# Patient Record
Sex: MALE | Race: WHITE | NOT HISPANIC OR LATINO | Employment: OTHER | URBAN - METROPOLITAN AREA
[De-identification: names, ages, dates, MRNs, and addresses within clinical notes are randomized per-mention and may not be internally consistent; named-entity substitution may affect disease eponyms.]

---

## 2019-06-06 ENCOUNTER — APPOINTMENT (EMERGENCY)
Dept: CT IMAGING | Facility: HOSPITAL | Age: 49
DRG: 392 | End: 2019-06-06
Payer: OTHER GOVERNMENT

## 2019-06-06 ENCOUNTER — HOSPITAL ENCOUNTER (INPATIENT)
Facility: HOSPITAL | Age: 49
LOS: 5 days | Discharge: HOME | DRG: 392 | End: 2019-06-11
Attending: EMERGENCY MEDICINE | Admitting: SURGERY
Payer: OTHER GOVERNMENT

## 2019-06-06 DIAGNOSIS — K57.20 DIVERTICULITIS OF LARGE INTESTINE WITH PERFORATION AND ABSCESS WITHOUT BLEEDING: Primary | ICD-10-CM

## 2019-06-06 LAB
ALBUMIN SERPL BCP-MCNC: 3.3 G/DL (ref 3.5–5)
ALP SERPL-CCNC: 116 U/L (ref 46–116)
ALT SERPL W P-5'-P-CCNC: 56 U/L (ref 12–78)
ANION GAP SERPL CALCULATED.3IONS-SCNC: 11 MMOL/L (ref 4–13)
AST SERPL W P-5'-P-CCNC: 28 U/L (ref 5–45)
BASOPHILS # BLD AUTO: 0.08 THOUSANDS/ΜL (ref 0–0.1)
BASOPHILS NFR BLD AUTO: 1 % (ref 0–1)
BILIRUB SERPL-MCNC: 0.4 MG/DL (ref 0.2–1)
BILIRUB UR QL STRIP: NEGATIVE
BUN SERPL-MCNC: 13 MG/DL (ref 5–25)
CALCIUM SERPL-MCNC: 9.1 MG/DL (ref 8.3–10.1)
CHLORIDE SERPL-SCNC: 102 MMOL/L (ref 100–108)
CLARITY UR: CLEAR
CO2 SERPL-SCNC: 26 MMOL/L (ref 21–32)
COLOR UR: YELLOW
CREAT SERPL-MCNC: 1.21 MG/DL (ref 0.6–1.3)
EOSINOPHIL # BLD AUTO: 0.15 THOUSAND/ΜL (ref 0–0.61)
EOSINOPHIL NFR BLD AUTO: 1 % (ref 0–6)
ERYTHROCYTE [DISTWIDTH] IN BLOOD BY AUTOMATED COUNT: 13.2 % (ref 11.6–15.1)
GFR SERPL CREATININE-BSD FRML MDRD: 70 ML/MIN/1.73SQ M
GLUCOSE SERPL-MCNC: 104 MG/DL (ref 65–140)
GLUCOSE UR STRIP-MCNC: NEGATIVE MG/DL
HCT VFR BLD AUTO: 50.2 % (ref 36.5–49.3)
HGB BLD-MCNC: 16.5 G/DL (ref 12–17)
HGB UR QL STRIP.AUTO: NEGATIVE
IMM GRANULOCYTES # BLD AUTO: 0.08 THOUSAND/UL (ref 0–0.2)
IMM GRANULOCYTES NFR BLD AUTO: 1 % (ref 0–2)
KETONES UR STRIP-MCNC: NEGATIVE MG/DL
LEUKOCYTE ESTERASE UR QL STRIP: NEGATIVE
LIPASE SERPL-CCNC: 97 U/L (ref 73–393)
LYMPHOCYTES # BLD AUTO: 3.03 THOUSANDS/ΜL (ref 0.6–4.47)
LYMPHOCYTES NFR BLD AUTO: 23 % (ref 14–44)
MCH RBC QN AUTO: 28.2 PG (ref 26.8–34.3)
MCHC RBC AUTO-ENTMCNC: 32.9 G/DL (ref 31.4–37.4)
MCV RBC AUTO: 86 FL (ref 82–98)
MONOCYTES # BLD AUTO: 1.4 THOUSAND/ΜL (ref 0.17–1.22)
MONOCYTES NFR BLD AUTO: 11 % (ref 4–12)
NEUTROPHILS # BLD AUTO: 8.25 THOUSANDS/ΜL (ref 1.85–7.62)
NEUTS SEG NFR BLD AUTO: 63 % (ref 43–75)
NITRITE UR QL STRIP: NEGATIVE
NRBC BLD AUTO-RTO: 0 /100 WBCS
PH UR STRIP.AUTO: 6 [PH]
PLATELET # BLD AUTO: 229 THOUSANDS/UL (ref 149–390)
PMV BLD AUTO: 11.2 FL (ref 8.9–12.7)
POTASSIUM SERPL-SCNC: 4.2 MMOL/L (ref 3.5–5.3)
PROT SERPL-MCNC: 7.5 G/DL (ref 6.4–8.2)
PROT UR STRIP-MCNC: NEGATIVE MG/DL
RBC # BLD AUTO: 5.85 MILLION/UL (ref 3.88–5.62)
SODIUM SERPL-SCNC: 139 MMOL/L (ref 136–145)
SP GR UR STRIP.AUTO: 1.01 (ref 1–1.03)
UROBILINOGEN UR QL STRIP.AUTO: 0.2 E.U./DL
WBC # BLD AUTO: 12.99 THOUSAND/UL (ref 4.31–10.16)

## 2019-06-06 PROCEDURE — 81003 URINALYSIS AUTO W/O SCOPE: CPT | Performed by: PHYSICIAN ASSISTANT

## 2019-06-06 PROCEDURE — 99285 EMERGENCY DEPT VISIT HI MDM: CPT | Performed by: PHYSICIAN ASSISTANT

## 2019-06-06 PROCEDURE — 80053 COMPREHEN METABOLIC PANEL: CPT | Performed by: PHYSICIAN ASSISTANT

## 2019-06-06 PROCEDURE — 83690 ASSAY OF LIPASE: CPT | Performed by: PHYSICIAN ASSISTANT

## 2019-06-06 PROCEDURE — 96375 TX/PRO/DX INJ NEW DRUG ADDON: CPT

## 2019-06-06 PROCEDURE — 85025 COMPLETE CBC W/AUTO DIFF WBC: CPT | Performed by: PHYSICIAN ASSISTANT

## 2019-06-06 PROCEDURE — 96374 THER/PROPH/DIAG INJ IV PUSH: CPT

## 2019-06-06 PROCEDURE — 99285 EMERGENCY DEPT VISIT HI MDM: CPT

## 2019-06-06 PROCEDURE — 36415 COLL VENOUS BLD VENIPUNCTURE: CPT | Performed by: PHYSICIAN ASSISTANT

## 2019-06-06 PROCEDURE — 96361 HYDRATE IV INFUSION ADD-ON: CPT

## 2019-06-06 PROCEDURE — 99221 1ST HOSP IP/OBS SF/LOW 40: CPT | Performed by: PHYSICIAN ASSISTANT

## 2019-06-06 PROCEDURE — NC001 PR NO CHARGE: Performed by: PHYSICIAN ASSISTANT

## 2019-06-06 PROCEDURE — 74177 CT ABD & PELVIS W/CONTRAST: CPT

## 2019-06-06 RX ORDER — HEPARIN SODIUM 5000 [USP'U]/ML
5000 INJECTION, SOLUTION INTRAVENOUS; SUBCUTANEOUS EVERY 8 HOURS SCHEDULED
Status: DISCONTINUED | OUTPATIENT
Start: 2019-06-06 | End: 2019-06-11 | Stop reason: HOSPADM

## 2019-06-06 RX ORDER — DICYCLOMINE HCL 20 MG
20 TABLET ORAL ONCE
Status: COMPLETED | OUTPATIENT
Start: 2019-06-06 | End: 2019-06-06

## 2019-06-06 RX ORDER — DIPHENHYDRAMINE HYDROCHLORIDE 50 MG/ML
25 INJECTION INTRAMUSCULAR; INTRAVENOUS
Status: DISCONTINUED | OUTPATIENT
Start: 2019-06-06 | End: 2019-06-11 | Stop reason: HOSPADM

## 2019-06-06 RX ORDER — SODIUM CHLORIDE, SODIUM LACTATE, POTASSIUM CHLORIDE, CALCIUM CHLORIDE 600; 310; 30; 20 MG/100ML; MG/100ML; MG/100ML; MG/100ML
125 INJECTION, SOLUTION INTRAVENOUS CONTINUOUS
Status: DISCONTINUED | OUTPATIENT
Start: 2019-06-06 | End: 2019-06-11 | Stop reason: HOSPADM

## 2019-06-06 RX ORDER — ONDANSETRON 2 MG/ML
4 INJECTION INTRAMUSCULAR; INTRAVENOUS EVERY 6 HOURS PRN
Status: DISCONTINUED | OUTPATIENT
Start: 2019-06-06 | End: 2019-06-11 | Stop reason: HOSPADM

## 2019-06-06 RX ORDER — HYDROMORPHONE HCL/PF 1 MG/ML
0.5 SYRINGE (ML) INJECTION
Status: DISCONTINUED | OUTPATIENT
Start: 2019-06-06 | End: 2019-06-11 | Stop reason: HOSPADM

## 2019-06-06 RX ORDER — NICOTINE 21 MG/24HR
1 PATCH, TRANSDERMAL 24 HOURS TRANSDERMAL DAILY
Status: DISCONTINUED | OUTPATIENT
Start: 2019-06-07 | End: 2019-06-11 | Stop reason: HOSPADM

## 2019-06-06 RX ORDER — KETOROLAC TROMETHAMINE 30 MG/ML
15 INJECTION, SOLUTION INTRAMUSCULAR; INTRAVENOUS ONCE
Status: COMPLETED | OUTPATIENT
Start: 2019-06-06 | End: 2019-06-06

## 2019-06-06 RX ORDER — KETOROLAC TROMETHAMINE 30 MG/ML
15 INJECTION, SOLUTION INTRAMUSCULAR; INTRAVENOUS EVERY 6 HOURS PRN
Status: DISCONTINUED | OUTPATIENT
Start: 2019-06-06 | End: 2019-06-11 | Stop reason: HOSPADM

## 2019-06-06 RX ORDER — MORPHINE SULFATE 4 MG/ML
4 INJECTION, SOLUTION INTRAMUSCULAR; INTRAVENOUS ONCE
Status: COMPLETED | OUTPATIENT
Start: 2019-06-06 | End: 2019-06-06

## 2019-06-06 RX ADMIN — METRONIDAZOLE 500 MG: 500 INJECTION, SOLUTION INTRAVENOUS at 20:47

## 2019-06-06 RX ADMIN — IOHEXOL 50 ML: 240 INJECTION, SOLUTION INTRATHECAL; INTRAVASCULAR; INTRAVENOUS; ORAL at 16:08

## 2019-06-06 RX ADMIN — SODIUM CHLORIDE, SODIUM LACTATE, POTASSIUM CHLORIDE, AND CALCIUM CHLORIDE 125 ML/HR: .6; .31; .03; .02 INJECTION, SOLUTION INTRAVENOUS at 20:31

## 2019-06-06 RX ADMIN — DICYCLOMINE HYDROCHLORIDE 20 MG: 20 TABLET ORAL at 15:45

## 2019-06-06 RX ADMIN — DIPHENHYDRAMINE HYDROCHLORIDE 25 MG: 50 INJECTION, SOLUTION INTRAMUSCULAR; INTRAVENOUS at 22:52

## 2019-06-06 RX ADMIN — IOHEXOL 100 ML: 350 INJECTION, SOLUTION INTRAVENOUS at 16:09

## 2019-06-06 RX ADMIN — MORPHINE SULFATE 4 MG: 4 INJECTION INTRAVENOUS at 16:57

## 2019-06-06 RX ADMIN — SODIUM CHLORIDE 1000 ML: 0.9 INJECTION, SOLUTION INTRAVENOUS at 13:16

## 2019-06-06 RX ADMIN — HYDROMORPHONE HYDROCHLORIDE 0.5 MG: 1 INJECTION, SOLUTION INTRAMUSCULAR; INTRAVENOUS; SUBCUTANEOUS at 20:37

## 2019-06-06 RX ADMIN — PIPERACILLIN SODIUM,TAZOBACTAM SODIUM 4.5 G: 4; .5 INJECTION, POWDER, FOR SOLUTION INTRAVENOUS at 17:06

## 2019-06-06 RX ADMIN — KETOROLAC TROMETHAMINE 15 MG: 30 INJECTION, SOLUTION INTRAMUSCULAR; INTRAVENOUS at 13:19

## 2019-06-06 RX ADMIN — HEPARIN SODIUM 5000 UNITS: 5000 INJECTION, SOLUTION INTRAVENOUS; SUBCUTANEOUS at 22:52

## 2019-06-07 PROBLEM — K57.20 DIVERTICULITIS OF LARGE INTESTINE WITH PERFORATION AND ABSCESS WITHOUT BLEEDING: Status: ACTIVE | Noted: 2019-06-07

## 2019-06-07 PROBLEM — R10.9 ABDOMINAL PAIN: Status: ACTIVE | Noted: 2019-06-07

## 2019-06-07 LAB
ANION GAP SERPL CALCULATED.3IONS-SCNC: 8 MMOL/L (ref 4–13)
BASOPHILS # BLD AUTO: 0.07 THOUSANDS/ΜL (ref 0–0.1)
BASOPHILS NFR BLD AUTO: 1 % (ref 0–1)
BUN SERPL-MCNC: 15 MG/DL (ref 5–25)
CALCIUM SERPL-MCNC: 8.7 MG/DL (ref 8.3–10.1)
CHLORIDE SERPL-SCNC: 104 MMOL/L (ref 100–108)
CO2 SERPL-SCNC: 27 MMOL/L (ref 21–32)
CREAT SERPL-MCNC: 1.24 MG/DL (ref 0.6–1.3)
EOSINOPHIL # BLD AUTO: 0.16 THOUSAND/ΜL (ref 0–0.61)
EOSINOPHIL NFR BLD AUTO: 1 % (ref 0–6)
ERYTHROCYTE [DISTWIDTH] IN BLOOD BY AUTOMATED COUNT: 13.3 % (ref 11.6–15.1)
GFR SERPL CREATININE-BSD FRML MDRD: 68 ML/MIN/1.73SQ M
GLUCOSE SERPL-MCNC: 113 MG/DL (ref 65–140)
HCT VFR BLD AUTO: 46.9 % (ref 36.5–49.3)
HGB BLD-MCNC: 15.3 G/DL (ref 12–17)
IMM GRANULOCYTES # BLD AUTO: 0.08 THOUSAND/UL (ref 0–0.2)
IMM GRANULOCYTES NFR BLD AUTO: 1 % (ref 0–2)
LYMPHOCYTES # BLD AUTO: 2.66 THOUSANDS/ΜL (ref 0.6–4.47)
LYMPHOCYTES NFR BLD AUTO: 21 % (ref 14–44)
MCH RBC QN AUTO: 28.2 PG (ref 26.8–34.3)
MCHC RBC AUTO-ENTMCNC: 32.6 G/DL (ref 31.4–37.4)
MCV RBC AUTO: 87 FL (ref 82–98)
MONOCYTES # BLD AUTO: 1.17 THOUSAND/ΜL (ref 0.17–1.22)
MONOCYTES NFR BLD AUTO: 9 % (ref 4–12)
NEUTROPHILS # BLD AUTO: 8.28 THOUSANDS/ΜL (ref 1.85–7.62)
NEUTS SEG NFR BLD AUTO: 67 % (ref 43–75)
NRBC BLD AUTO-RTO: 0 /100 WBCS
PLATELET # BLD AUTO: 204 THOUSANDS/UL (ref 149–390)
PMV BLD AUTO: 11.5 FL (ref 8.9–12.7)
POTASSIUM SERPL-SCNC: 4.6 MMOL/L (ref 3.5–5.3)
RBC # BLD AUTO: 5.42 MILLION/UL (ref 3.88–5.62)
SODIUM SERPL-SCNC: 139 MMOL/L (ref 136–145)
WBC # BLD AUTO: 12.42 THOUSAND/UL (ref 4.31–10.16)

## 2019-06-07 PROCEDURE — 99232 SBSQ HOSP IP/OBS MODERATE 35: CPT | Performed by: SURGERY

## 2019-06-07 PROCEDURE — 80048 BASIC METABOLIC PNL TOTAL CA: CPT | Performed by: PHYSICIAN ASSISTANT

## 2019-06-07 PROCEDURE — 85025 COMPLETE CBC W/AUTO DIFF WBC: CPT | Performed by: PHYSICIAN ASSISTANT

## 2019-06-07 RX ORDER — LEVOFLOXACIN 500 MG/1
500 TABLET, FILM COATED ORAL EVERY 24 HOURS
Qty: 10 TABLET | Refills: 0 | Status: SHIPPED | OUTPATIENT
Start: 2019-06-07 | End: 2019-06-17

## 2019-06-07 RX ORDER — METRONIDAZOLE 500 MG/1
500 TABLET ORAL EVERY 8 HOURS SCHEDULED
Qty: 30 TABLET | Refills: 0 | Status: SHIPPED | OUTPATIENT
Start: 2019-06-07 | End: 2019-06-17

## 2019-06-07 RX ADMIN — METRONIDAZOLE 500 MG: 500 INJECTION, SOLUTION INTRAVENOUS at 05:00

## 2019-06-07 RX ADMIN — KETOROLAC TROMETHAMINE 15 MG: 30 INJECTION, SOLUTION INTRAMUSCULAR; INTRAVENOUS at 19:40

## 2019-06-07 RX ADMIN — PIPERACILLIN SODIUM,TAZOBACTAM SODIUM 3.38 G: 3; .375 INJECTION, POWDER, FOR SOLUTION INTRAVENOUS at 00:00

## 2019-06-07 RX ADMIN — HEPARIN SODIUM 5000 UNITS: 5000 INJECTION, SOLUTION INTRAVENOUS; SUBCUTANEOUS at 13:39

## 2019-06-07 RX ADMIN — HEPARIN SODIUM 5000 UNITS: 5000 INJECTION, SOLUTION INTRAVENOUS; SUBCUTANEOUS at 21:27

## 2019-06-07 RX ADMIN — METRONIDAZOLE 500 MG: 500 INJECTION, SOLUTION INTRAVENOUS at 19:42

## 2019-06-07 RX ADMIN — HYDROMORPHONE HYDROCHLORIDE 0.5 MG: 1 INJECTION, SOLUTION INTRAMUSCULAR; INTRAVENOUS; SUBCUTANEOUS at 04:59

## 2019-06-07 RX ADMIN — HYDROMORPHONE HYDROCHLORIDE 0.5 MG: 1 INJECTION, SOLUTION INTRAMUSCULAR; INTRAVENOUS; SUBCUTANEOUS at 12:51

## 2019-06-07 RX ADMIN — NICOTINE 1 PATCH: 14 PATCH TRANSDERMAL at 09:00

## 2019-06-07 RX ADMIN — METRONIDAZOLE 500 MG: 500 INJECTION, SOLUTION INTRAVENOUS at 12:20

## 2019-06-07 RX ADMIN — HEPARIN SODIUM 5000 UNITS: 5000 INJECTION, SOLUTION INTRAVENOUS; SUBCUTANEOUS at 05:00

## 2019-06-07 RX ADMIN — DIPHENHYDRAMINE HYDROCHLORIDE 25 MG: 50 INJECTION, SOLUTION INTRAMUSCULAR; INTRAVENOUS at 21:30

## 2019-06-07 RX ADMIN — PIPERACILLIN SODIUM,TAZOBACTAM SODIUM 3.38 G: 3; .375 INJECTION, POWDER, FOR SOLUTION INTRAVENOUS at 11:45

## 2019-06-07 RX ADMIN — SODIUM CHLORIDE, SODIUM LACTATE, POTASSIUM CHLORIDE, AND CALCIUM CHLORIDE 125 ML/HR: .6; .31; .03; .02 INJECTION, SOLUTION INTRAVENOUS at 19:42

## 2019-06-07 RX ADMIN — PIPERACILLIN SODIUM,TAZOBACTAM SODIUM 3.38 G: 3; .375 INJECTION, POWDER, FOR SOLUTION INTRAVENOUS at 23:31

## 2019-06-07 RX ADMIN — HYDROMORPHONE HYDROCHLORIDE 0.5 MG: 1 INJECTION, SOLUTION INTRAMUSCULAR; INTRAVENOUS; SUBCUTANEOUS at 17:39

## 2019-06-07 RX ADMIN — HYDROMORPHONE HYDROCHLORIDE 0.5 MG: 1 INJECTION, SOLUTION INTRAMUSCULAR; INTRAVENOUS; SUBCUTANEOUS at 21:27

## 2019-06-07 RX ADMIN — PIPERACILLIN SODIUM,TAZOBACTAM SODIUM 3.38 G: 3; .375 INJECTION, POWDER, FOR SOLUTION INTRAVENOUS at 05:00

## 2019-06-07 RX ADMIN — SODIUM CHLORIDE, SODIUM LACTATE, POTASSIUM CHLORIDE, AND CALCIUM CHLORIDE 125 ML/HR: .6; .31; .03; .02 INJECTION, SOLUTION INTRAVENOUS at 09:00

## 2019-06-07 RX ADMIN — PIPERACILLIN SODIUM,TAZOBACTAM SODIUM 3.38 G: 3; .375 INJECTION, POWDER, FOR SOLUTION INTRAVENOUS at 17:39

## 2019-06-07 RX ADMIN — KETOROLAC TROMETHAMINE 15 MG: 30 INJECTION, SOLUTION INTRAMUSCULAR; INTRAVENOUS at 09:07

## 2019-06-08 LAB
ANION GAP SERPL CALCULATED.3IONS-SCNC: 7 MMOL/L (ref 4–13)
BASOPHILS # BLD AUTO: 0.05 THOUSANDS/ΜL (ref 0–0.1)
BASOPHILS NFR BLD AUTO: 1 % (ref 0–1)
BUN SERPL-MCNC: 18 MG/DL (ref 5–25)
CALCIUM SERPL-MCNC: 8.9 MG/DL (ref 8.3–10.1)
CHLORIDE SERPL-SCNC: 105 MMOL/L (ref 100–108)
CO2 SERPL-SCNC: 28 MMOL/L (ref 21–32)
CREAT SERPL-MCNC: 1.26 MG/DL (ref 0.6–1.3)
EOSINOPHIL # BLD AUTO: 0.15 THOUSAND/ΜL (ref 0–0.61)
EOSINOPHIL NFR BLD AUTO: 1 % (ref 0–6)
ERYTHROCYTE [DISTWIDTH] IN BLOOD BY AUTOMATED COUNT: 13.2 % (ref 11.6–15.1)
GFR SERPL CREATININE-BSD FRML MDRD: 67 ML/MIN/1.73SQ M
GLUCOSE SERPL-MCNC: 85 MG/DL (ref 65–140)
HCT VFR BLD AUTO: 45.1 % (ref 36.5–49.3)
HGB BLD-MCNC: 14.5 G/DL (ref 12–17)
IMM GRANULOCYTES # BLD AUTO: 0.06 THOUSAND/UL (ref 0–0.2)
IMM GRANULOCYTES NFR BLD AUTO: 1 % (ref 0–2)
LYMPHOCYTES # BLD AUTO: 2.36 THOUSANDS/ΜL (ref 0.6–4.47)
LYMPHOCYTES NFR BLD AUTO: 22 % (ref 14–44)
MAGNESIUM SERPL-MCNC: 2.2 MG/DL (ref 1.6–2.6)
MCH RBC QN AUTO: 27.8 PG (ref 26.8–34.3)
MCHC RBC AUTO-ENTMCNC: 32.2 G/DL (ref 31.4–37.4)
MCV RBC AUTO: 87 FL (ref 82–98)
MONOCYTES # BLD AUTO: 1.06 THOUSAND/ΜL (ref 0.17–1.22)
MONOCYTES NFR BLD AUTO: 10 % (ref 4–12)
NEUTROPHILS # BLD AUTO: 6.94 THOUSANDS/ΜL (ref 1.85–7.62)
NEUTS SEG NFR BLD AUTO: 65 % (ref 43–75)
NRBC BLD AUTO-RTO: 0 /100 WBCS
PLATELET # BLD AUTO: 210 THOUSANDS/UL (ref 149–390)
PMV BLD AUTO: 11.6 FL (ref 8.9–12.7)
POTASSIUM SERPL-SCNC: 4.2 MMOL/L (ref 3.5–5.3)
RBC # BLD AUTO: 5.21 MILLION/UL (ref 3.88–5.62)
SODIUM SERPL-SCNC: 140 MMOL/L (ref 136–145)
WBC # BLD AUTO: 10.62 THOUSAND/UL (ref 4.31–10.16)

## 2019-06-08 PROCEDURE — 83735 ASSAY OF MAGNESIUM: CPT | Performed by: PHYSICIAN ASSISTANT

## 2019-06-08 PROCEDURE — 80048 BASIC METABOLIC PNL TOTAL CA: CPT | Performed by: PHYSICIAN ASSISTANT

## 2019-06-08 PROCEDURE — 85025 COMPLETE CBC W/AUTO DIFF WBC: CPT | Performed by: PHYSICIAN ASSISTANT

## 2019-06-08 PROCEDURE — 99231 SBSQ HOSP IP/OBS SF/LOW 25: CPT | Performed by: SURGERY

## 2019-06-08 RX ADMIN — HYDROMORPHONE HYDROCHLORIDE 0.5 MG: 1 INJECTION, SOLUTION INTRAMUSCULAR; INTRAVENOUS; SUBCUTANEOUS at 04:53

## 2019-06-08 RX ADMIN — PIPERACILLIN SODIUM,TAZOBACTAM SODIUM 3.38 G: 3; .375 INJECTION, POWDER, FOR SOLUTION INTRAVENOUS at 11:23

## 2019-06-08 RX ADMIN — PIPERACILLIN SODIUM,TAZOBACTAM SODIUM 3.38 G: 3; .375 INJECTION, POWDER, FOR SOLUTION INTRAVENOUS at 17:29

## 2019-06-08 RX ADMIN — METRONIDAZOLE 500 MG: 500 INJECTION, SOLUTION INTRAVENOUS at 21:05

## 2019-06-08 RX ADMIN — HYDROMORPHONE HYDROCHLORIDE 0.5 MG: 1 INJECTION, SOLUTION INTRAMUSCULAR; INTRAVENOUS; SUBCUTANEOUS at 12:09

## 2019-06-08 RX ADMIN — SODIUM CHLORIDE, SODIUM LACTATE, POTASSIUM CHLORIDE, AND CALCIUM CHLORIDE 125 ML/HR: .6; .31; .03; .02 INJECTION, SOLUTION INTRAVENOUS at 04:54

## 2019-06-08 RX ADMIN — HYDROMORPHONE HYDROCHLORIDE 0.5 MG: 1 INJECTION, SOLUTION INTRAMUSCULAR; INTRAVENOUS; SUBCUTANEOUS at 15:43

## 2019-06-08 RX ADMIN — METRONIDAZOLE 500 MG: 500 INJECTION, SOLUTION INTRAVENOUS at 12:02

## 2019-06-08 RX ADMIN — HEPARIN SODIUM 5000 UNITS: 5000 INJECTION, SOLUTION INTRAVENOUS; SUBCUTANEOUS at 05:00

## 2019-06-08 RX ADMIN — HYDROMORPHONE HYDROCHLORIDE 0.5 MG: 1 INJECTION, SOLUTION INTRAMUSCULAR; INTRAVENOUS; SUBCUTANEOUS at 08:25

## 2019-06-08 RX ADMIN — HYDROMORPHONE HYDROCHLORIDE 0.5 MG: 1 INJECTION, SOLUTION INTRAMUSCULAR; INTRAVENOUS; SUBCUTANEOUS at 18:56

## 2019-06-08 RX ADMIN — METRONIDAZOLE 500 MG: 500 INJECTION, SOLUTION INTRAVENOUS at 04:56

## 2019-06-08 RX ADMIN — KETOROLAC TROMETHAMINE 15 MG: 30 INJECTION, SOLUTION INTRAMUSCULAR; INTRAVENOUS at 20:20

## 2019-06-08 RX ADMIN — HYDROMORPHONE HYDROCHLORIDE 0.5 MG: 1 INJECTION, SOLUTION INTRAMUSCULAR; INTRAVENOUS; SUBCUTANEOUS at 22:12

## 2019-06-08 RX ADMIN — DIPHENHYDRAMINE HYDROCHLORIDE 25 MG: 50 INJECTION, SOLUTION INTRAMUSCULAR; INTRAVENOUS at 21:05

## 2019-06-08 RX ADMIN — HEPARIN SODIUM 5000 UNITS: 5000 INJECTION, SOLUTION INTRAVENOUS; SUBCUTANEOUS at 21:12

## 2019-06-08 RX ADMIN — PIPERACILLIN SODIUM,TAZOBACTAM SODIUM 3.38 G: 3; .375 INJECTION, POWDER, FOR SOLUTION INTRAVENOUS at 05:54

## 2019-06-08 RX ADMIN — HYDROMORPHONE HYDROCHLORIDE 0.5 MG: 1 INJECTION, SOLUTION INTRAMUSCULAR; INTRAVENOUS; SUBCUTANEOUS at 00:59

## 2019-06-08 RX ADMIN — NICOTINE 1 PATCH: 14 PATCH TRANSDERMAL at 08:25

## 2019-06-08 RX ADMIN — HEPARIN SODIUM 5000 UNITS: 5000 INJECTION, SOLUTION INTRAVENOUS; SUBCUTANEOUS at 14:23

## 2019-06-09 PROCEDURE — 99232 SBSQ HOSP IP/OBS MODERATE 35: CPT | Performed by: SURGERY

## 2019-06-09 RX ORDER — NICOTINE 21 MG/24HR
14 PATCH, TRANSDERMAL 24 HOURS TRANSDERMAL ONCE
Status: COMPLETED | OUTPATIENT
Start: 2019-06-09 | End: 2019-06-10

## 2019-06-09 RX ADMIN — KETOROLAC TROMETHAMINE 15 MG: 30 INJECTION, SOLUTION INTRAMUSCULAR; INTRAVENOUS at 20:48

## 2019-06-09 RX ADMIN — HYDROMORPHONE HYDROCHLORIDE 0.5 MG: 1 INJECTION, SOLUTION INTRAMUSCULAR; INTRAVENOUS; SUBCUTANEOUS at 17:31

## 2019-06-09 RX ADMIN — HEPARIN SODIUM 5000 UNITS: 5000 INJECTION, SOLUTION INTRAVENOUS; SUBCUTANEOUS at 13:14

## 2019-06-09 RX ADMIN — METRONIDAZOLE 500 MG: 500 INJECTION, SOLUTION INTRAVENOUS at 20:46

## 2019-06-09 RX ADMIN — PIPERACILLIN SODIUM,TAZOBACTAM SODIUM 3.38 G: 3; .375 INJECTION, POWDER, FOR SOLUTION INTRAVENOUS at 23:34

## 2019-06-09 RX ADMIN — KETOROLAC TROMETHAMINE 15 MG: 30 INJECTION, SOLUTION INTRAMUSCULAR; INTRAVENOUS at 11:53

## 2019-06-09 RX ADMIN — HEPARIN SODIUM 5000 UNITS: 5000 INJECTION, SOLUTION INTRAVENOUS; SUBCUTANEOUS at 05:02

## 2019-06-09 RX ADMIN — HYDROMORPHONE HYDROCHLORIDE 0.5 MG: 1 INJECTION, SOLUTION INTRAMUSCULAR; INTRAVENOUS; SUBCUTANEOUS at 05:02

## 2019-06-09 RX ADMIN — SODIUM CHLORIDE, SODIUM LACTATE, POTASSIUM CHLORIDE, AND CALCIUM CHLORIDE 125 ML/HR: .6; .31; .03; .02 INJECTION, SOLUTION INTRAVENOUS at 22:19

## 2019-06-09 RX ADMIN — HYDROMORPHONE HYDROCHLORIDE 0.5 MG: 1 INJECTION, SOLUTION INTRAMUSCULAR; INTRAVENOUS; SUBCUTANEOUS at 09:00

## 2019-06-09 RX ADMIN — PIPERACILLIN SODIUM,TAZOBACTAM SODIUM 3.38 G: 3; .375 INJECTION, POWDER, FOR SOLUTION INTRAVENOUS at 05:57

## 2019-06-09 RX ADMIN — HEPARIN SODIUM 5000 UNITS: 5000 INJECTION, SOLUTION INTRAVENOUS; SUBCUTANEOUS at 22:19

## 2019-06-09 RX ADMIN — METRONIDAZOLE 500 MG: 500 INJECTION, SOLUTION INTRAVENOUS at 05:02

## 2019-06-09 RX ADMIN — HYDROMORPHONE HYDROCHLORIDE 0.5 MG: 1 INJECTION, SOLUTION INTRAMUSCULAR; INTRAVENOUS; SUBCUTANEOUS at 22:36

## 2019-06-09 RX ADMIN — DIPHENHYDRAMINE HYDROCHLORIDE 25 MG: 50 INJECTION, SOLUTION INTRAMUSCULAR; INTRAVENOUS at 23:33

## 2019-06-09 RX ADMIN — NICOTINE 14 MG: 14 PATCH TRANSDERMAL at 22:19

## 2019-06-09 RX ADMIN — KETOROLAC TROMETHAMINE 15 MG: 30 INJECTION, SOLUTION INTRAMUSCULAR; INTRAVENOUS at 06:00

## 2019-06-09 RX ADMIN — SODIUM CHLORIDE, SODIUM LACTATE, POTASSIUM CHLORIDE, AND CALCIUM CHLORIDE 125 ML/HR: .6; .31; .03; .02 INJECTION, SOLUTION INTRAVENOUS at 04:53

## 2019-06-09 RX ADMIN — HYDROMORPHONE HYDROCHLORIDE 0.5 MG: 1 INJECTION, SOLUTION INTRAMUSCULAR; INTRAVENOUS; SUBCUTANEOUS at 01:54

## 2019-06-09 RX ADMIN — PIPERACILLIN SODIUM,TAZOBACTAM SODIUM 3.38 G: 3; .375 INJECTION, POWDER, FOR SOLUTION INTRAVENOUS at 11:18

## 2019-06-09 RX ADMIN — METRONIDAZOLE 500 MG: 500 INJECTION, SOLUTION INTRAVENOUS at 11:50

## 2019-06-09 RX ADMIN — SODIUM CHLORIDE, SODIUM LACTATE, POTASSIUM CHLORIDE, AND CALCIUM CHLORIDE 125 ML/HR: .6; .31; .03; .02 INJECTION, SOLUTION INTRAVENOUS at 13:13

## 2019-06-09 RX ADMIN — NICOTINE 1 PATCH: 14 PATCH TRANSDERMAL at 09:00

## 2019-06-09 RX ADMIN — PIPERACILLIN SODIUM,TAZOBACTAM SODIUM 3.38 G: 3; .375 INJECTION, POWDER, FOR SOLUTION INTRAVENOUS at 00:30

## 2019-06-09 RX ADMIN — PIPERACILLIN SODIUM,TAZOBACTAM SODIUM 3.38 G: 3; .375 INJECTION, POWDER, FOR SOLUTION INTRAVENOUS at 17:31

## 2019-06-10 ENCOUNTER — APPOINTMENT (INPATIENT)
Dept: CT IMAGING | Facility: HOSPITAL | Age: 49
DRG: 392 | End: 2019-06-10
Payer: OTHER GOVERNMENT

## 2019-06-10 LAB
ANION GAP SERPL CALCULATED.3IONS-SCNC: 10 MMOL/L (ref 4–13)
BASOPHILS # BLD AUTO: 0.06 THOUSANDS/ΜL (ref 0–0.1)
BASOPHILS NFR BLD AUTO: 1 % (ref 0–1)
BUN SERPL-MCNC: 9 MG/DL (ref 5–25)
CALCIUM SERPL-MCNC: 9.1 MG/DL (ref 8.3–10.1)
CHLORIDE SERPL-SCNC: 101 MMOL/L (ref 100–108)
CO2 SERPL-SCNC: 29 MMOL/L (ref 21–32)
CREAT SERPL-MCNC: 1.24 MG/DL (ref 0.6–1.3)
EOSINOPHIL # BLD AUTO: 0.15 THOUSAND/ΜL (ref 0–0.61)
EOSINOPHIL NFR BLD AUTO: 2 % (ref 0–6)
ERYTHROCYTE [DISTWIDTH] IN BLOOD BY AUTOMATED COUNT: 13.1 % (ref 11.6–15.1)
GFR SERPL CREATININE-BSD FRML MDRD: 68 ML/MIN/1.73SQ M
GLUCOSE SERPL-MCNC: 96 MG/DL (ref 65–140)
HCT VFR BLD AUTO: 46.2 % (ref 36.5–49.3)
HGB BLD-MCNC: 14.8 G/DL (ref 12–17)
IMM GRANULOCYTES # BLD AUTO: 0.04 THOUSAND/UL (ref 0–0.2)
IMM GRANULOCYTES NFR BLD AUTO: 1 % (ref 0–2)
LYMPHOCYTES # BLD AUTO: 1.94 THOUSANDS/ΜL (ref 0.6–4.47)
LYMPHOCYTES NFR BLD AUTO: 28 % (ref 14–44)
MCH RBC QN AUTO: 28 PG (ref 26.8–34.3)
MCHC RBC AUTO-ENTMCNC: 32 G/DL (ref 31.4–37.4)
MCV RBC AUTO: 87 FL (ref 82–98)
MONOCYTES # BLD AUTO: 0.75 THOUSAND/ΜL (ref 0.17–1.22)
MONOCYTES NFR BLD AUTO: 11 % (ref 4–12)
NEUTROPHILS # BLD AUTO: 3.95 THOUSANDS/ΜL (ref 1.85–7.62)
NEUTS SEG NFR BLD AUTO: 57 % (ref 43–75)
NRBC BLD AUTO-RTO: 0 /100 WBCS
PLATELET # BLD AUTO: 226 THOUSANDS/UL (ref 149–390)
PMV BLD AUTO: 11.4 FL (ref 8.9–12.7)
POTASSIUM SERPL-SCNC: 3.8 MMOL/L (ref 3.5–5.3)
RBC # BLD AUTO: 5.29 MILLION/UL (ref 3.88–5.62)
SODIUM SERPL-SCNC: 140 MMOL/L (ref 136–145)
WBC # BLD AUTO: 6.89 THOUSAND/UL (ref 4.31–10.16)

## 2019-06-10 PROCEDURE — 99232 SBSQ HOSP IP/OBS MODERATE 35: CPT | Performed by: SURGERY

## 2019-06-10 PROCEDURE — 85025 COMPLETE CBC W/AUTO DIFF WBC: CPT | Performed by: PHYSICIAN ASSISTANT

## 2019-06-10 PROCEDURE — 80048 BASIC METABOLIC PNL TOTAL CA: CPT | Performed by: PHYSICIAN ASSISTANT

## 2019-06-10 PROCEDURE — 74177 CT ABD & PELVIS W/CONTRAST: CPT

## 2019-06-10 RX ADMIN — DIPHENHYDRAMINE HYDROCHLORIDE 25 MG: 50 INJECTION, SOLUTION INTRAMUSCULAR; INTRAVENOUS at 23:23

## 2019-06-10 RX ADMIN — NICOTINE 1 PATCH: 14 PATCH TRANSDERMAL at 09:29

## 2019-06-10 RX ADMIN — HEPARIN SODIUM 5000 UNITS: 5000 INJECTION, SOLUTION INTRAVENOUS; SUBCUTANEOUS at 14:55

## 2019-06-10 RX ADMIN — PIPERACILLIN SODIUM,TAZOBACTAM SODIUM 3.38 G: 3; .375 INJECTION, POWDER, FOR SOLUTION INTRAVENOUS at 23:23

## 2019-06-10 RX ADMIN — PIPERACILLIN SODIUM,TAZOBACTAM SODIUM 3.38 G: 3; .375 INJECTION, POWDER, FOR SOLUTION INTRAVENOUS at 06:26

## 2019-06-10 RX ADMIN — SODIUM CHLORIDE, SODIUM LACTATE, POTASSIUM CHLORIDE, AND CALCIUM CHLORIDE 125 ML/HR: .6; .31; .03; .02 INJECTION, SOLUTION INTRAVENOUS at 21:06

## 2019-06-10 RX ADMIN — KETOROLAC TROMETHAMINE 15 MG: 30 INJECTION, SOLUTION INTRAMUSCULAR; INTRAVENOUS at 17:21

## 2019-06-10 RX ADMIN — PIPERACILLIN SODIUM,TAZOBACTAM SODIUM 3.38 G: 3; .375 INJECTION, POWDER, FOR SOLUTION INTRAVENOUS at 11:51

## 2019-06-10 RX ADMIN — PIPERACILLIN SODIUM,TAZOBACTAM SODIUM 3.38 G: 3; .375 INJECTION, POWDER, FOR SOLUTION INTRAVENOUS at 17:15

## 2019-06-10 RX ADMIN — METRONIDAZOLE 500 MG: 500 INJECTION, SOLUTION INTRAVENOUS at 05:01

## 2019-06-10 RX ADMIN — IOHEXOL 100 ML: 350 INJECTION, SOLUTION INTRAVENOUS at 13:00

## 2019-06-10 RX ADMIN — HYDROMORPHONE HYDROCHLORIDE 0.5 MG: 1 INJECTION, SOLUTION INTRAMUSCULAR; INTRAVENOUS; SUBCUTANEOUS at 09:31

## 2019-06-10 RX ADMIN — IOHEXOL 50 ML: 240 INJECTION, SOLUTION INTRATHECAL; INTRAVASCULAR; INTRAVENOUS; ORAL at 13:00

## 2019-06-10 RX ADMIN — KETOROLAC TROMETHAMINE 15 MG: 30 INJECTION, SOLUTION INTRAMUSCULAR; INTRAVENOUS at 04:59

## 2019-06-10 RX ADMIN — HYDROMORPHONE HYDROCHLORIDE 0.5 MG: 1 INJECTION, SOLUTION INTRAMUSCULAR; INTRAVENOUS; SUBCUTANEOUS at 21:27

## 2019-06-10 RX ADMIN — HYDROMORPHONE HYDROCHLORIDE 0.5 MG: 1 INJECTION, SOLUTION INTRAMUSCULAR; INTRAVENOUS; SUBCUTANEOUS at 04:16

## 2019-06-10 RX ADMIN — HEPARIN SODIUM 5000 UNITS: 5000 INJECTION, SOLUTION INTRAVENOUS; SUBCUTANEOUS at 21:06

## 2019-06-10 RX ADMIN — METRONIDAZOLE 500 MG: 500 INJECTION, SOLUTION INTRAVENOUS at 12:45

## 2019-06-10 RX ADMIN — METRONIDAZOLE 500 MG: 500 INJECTION, SOLUTION INTRAVENOUS at 21:06

## 2019-06-10 RX ADMIN — HEPARIN SODIUM 5000 UNITS: 5000 INJECTION, SOLUTION INTRAVENOUS; SUBCUTANEOUS at 06:26

## 2019-06-11 VITALS
TEMPERATURE: 98.2 F | HEIGHT: 69 IN | DIASTOLIC BLOOD PRESSURE: 79 MMHG | WEIGHT: 247.7 LBS | BODY MASS INDEX: 36.69 KG/M2 | OXYGEN SATURATION: 95 % | RESPIRATION RATE: 18 BRPM | SYSTOLIC BLOOD PRESSURE: 142 MMHG | HEART RATE: 62 BPM

## 2019-06-11 PROBLEM — R10.9 ABDOMINAL PAIN: Status: RESOLVED | Noted: 2019-06-07 | Resolved: 2019-06-11

## 2019-06-11 LAB
ANION GAP SERPL CALCULATED.3IONS-SCNC: 8 MMOL/L (ref 4–13)
BASOPHILS # BLD AUTO: 0.07 THOUSANDS/ΜL (ref 0–0.1)
BASOPHILS NFR BLD AUTO: 1 % (ref 0–1)
BUN SERPL-MCNC: 7 MG/DL (ref 5–25)
CALCIUM SERPL-MCNC: 8.8 MG/DL (ref 8.3–10.1)
CHLORIDE SERPL-SCNC: 108 MMOL/L (ref 100–108)
CO2 SERPL-SCNC: 28 MMOL/L (ref 21–32)
CREAT SERPL-MCNC: 1.2 MG/DL (ref 0.6–1.3)
EOSINOPHIL # BLD AUTO: 0.24 THOUSAND/ΜL (ref 0–0.61)
EOSINOPHIL NFR BLD AUTO: 3 % (ref 0–6)
ERYTHROCYTE [DISTWIDTH] IN BLOOD BY AUTOMATED COUNT: 13.2 % (ref 11.6–15.1)
GFR SERPL CREATININE-BSD FRML MDRD: 71 ML/MIN/1.73SQ M
GLUCOSE SERPL-MCNC: 99 MG/DL (ref 65–140)
HCT VFR BLD AUTO: 45.8 % (ref 36.5–49.3)
HGB BLD-MCNC: 15 G/DL (ref 12–17)
IMM GRANULOCYTES # BLD AUTO: 0.04 THOUSAND/UL (ref 0–0.2)
IMM GRANULOCYTES NFR BLD AUTO: 1 % (ref 0–2)
LYMPHOCYTES # BLD AUTO: 2.32 THOUSANDS/ΜL (ref 0.6–4.47)
LYMPHOCYTES NFR BLD AUTO: 30 % (ref 14–44)
MCH RBC QN AUTO: 28 PG (ref 26.8–34.3)
MCHC RBC AUTO-ENTMCNC: 32.8 G/DL (ref 31.4–37.4)
MCV RBC AUTO: 85 FL (ref 82–98)
MONOCYTES # BLD AUTO: 0.74 THOUSAND/ΜL (ref 0.17–1.22)
MONOCYTES NFR BLD AUTO: 10 % (ref 4–12)
NEUTROPHILS # BLD AUTO: 4.24 THOUSANDS/ΜL (ref 1.85–7.62)
NEUTS SEG NFR BLD AUTO: 55 % (ref 43–75)
NRBC BLD AUTO-RTO: 0 /100 WBCS
PLATELET # BLD AUTO: 257 THOUSANDS/UL (ref 149–390)
PMV BLD AUTO: 11.8 FL (ref 8.9–12.7)
POTASSIUM SERPL-SCNC: 4.3 MMOL/L (ref 3.5–5.3)
RBC # BLD AUTO: 5.36 MILLION/UL (ref 3.88–5.62)
SODIUM SERPL-SCNC: 144 MMOL/L (ref 136–145)
WBC # BLD AUTO: 7.65 THOUSAND/UL (ref 4.31–10.16)

## 2019-06-11 PROCEDURE — 99238 HOSP IP/OBS DSCHRG MGMT 30/<: CPT | Performed by: PHYSICIAN ASSISTANT

## 2019-06-11 PROCEDURE — 80048 BASIC METABOLIC PNL TOTAL CA: CPT | Performed by: PHYSICIAN ASSISTANT

## 2019-06-11 PROCEDURE — 85025 COMPLETE CBC W/AUTO DIFF WBC: CPT | Performed by: PHYSICIAN ASSISTANT

## 2019-06-11 RX ADMIN — PIPERACILLIN SODIUM,TAZOBACTAM SODIUM 3.38 G: 3; .375 INJECTION, POWDER, FOR SOLUTION INTRAVENOUS at 06:42

## 2019-06-11 RX ADMIN — SODIUM CHLORIDE, SODIUM LACTATE, POTASSIUM CHLORIDE, AND CALCIUM CHLORIDE 125 ML/HR: .6; .31; .03; .02 INJECTION, SOLUTION INTRAVENOUS at 06:45

## 2019-06-11 RX ADMIN — HEPARIN SODIUM 5000 UNITS: 5000 INJECTION, SOLUTION INTRAVENOUS; SUBCUTANEOUS at 05:36

## 2019-06-11 RX ADMIN — METRONIDAZOLE 500 MG: 500 INJECTION, SOLUTION INTRAVENOUS at 05:36

## 2019-07-15 ENCOUNTER — OFFICE VISIT (OUTPATIENT)
Dept: SURGERY | Facility: HOSPITAL | Age: 49
End: 2019-07-15
Payer: COMMERCIAL

## 2019-07-15 VITALS
BODY MASS INDEX: 34.83 KG/M2 | HEIGHT: 69 IN | RESPIRATION RATE: 16 BRPM | HEART RATE: 73 BPM | WEIGHT: 235.2 LBS | DIASTOLIC BLOOD PRESSURE: 89 MMHG | SYSTOLIC BLOOD PRESSURE: 147 MMHG | TEMPERATURE: 97.3 F

## 2019-07-15 DIAGNOSIS — K57.20 DIVERTICULITIS OF LARGE INTESTINE WITH PERFORATION WITHOUT BLEEDING: Primary | ICD-10-CM

## 2019-07-15 PROCEDURE — 99213 OFFICE O/P EST LOW 20 MIN: CPT | Performed by: SURGERY

## 2019-07-15 RX ORDER — LIDOCAINE 50 MG/G
1 PATCH TOPICAL DAILY
COMMUNITY
End: 2019-09-05 | Stop reason: ALTCHOICE

## 2019-08-15 NOTE — PROGRESS NOTES
Assessment/Plan:   Sridevi Casiano is a 50 y o male who is here for Diverticulitis (6/6/19-6/1/19: Inpatient at [facility] for diverticulitis for perforation )  Doing well clinically since discharge  Plan: Complicated diverticulitis - discussed need for colonoscopy and then we will discuss possible elective sigmoid resection    Preoperative Clearance: None    HPI:  Sridevi Casiano is a 50 y o male who was referred for evaluation of Diverticulitis (6/6/19-6/1/19: Inpatient at [facility] for diverticulitis for perforation )  Clinically patient much improved  No acute issues    Currently   ROS:  General ROS: negative  negative for - chills, fatigue, fever or night sweats, weight loss  Respiratory ROS: no cough, shortness of breath, or wheezing  Cardiovascular ROS: no chest pain or dyspnea on exertion  Genito-Urinary ROS: no dysuria, trouble voiding, or hematuria  Musculoskeletal ROS: negative for - gait disturbance, joint pain or muscle pain  Neurological ROS: no TIA or stroke symptoms  No abdominal c/o    [unfilled]  Patient has no known allergies  Current Outpatient Medications:     lidocaine (LIDODERM) 5 %, Apply 1 patch topically daily Remove & Discard patch within 12 hours or as directed by MD, Disp: , Rfl:   Past Medical History:   Diagnosis Date    Chronic pain      Past Surgical History:   Procedure Laterality Date    BACK SURGERY       No family history on file  reports that he has been smoking cigarettes  He has been smoking about 2 00 packs per day  He has never used smokeless tobacco  He reports that he drinks alcohol  He reports that he does not use drugs      Labs:   Lab Results   Component Value Date    WBC 7 65 06/11/2019    WBC 6 89 06/10/2019    WBC 10 62 (H) 06/08/2019    WBC 13 38 (H) 06/01/2014    WBC 18 02 (H) 05/31/2014    WBC 21 26 (H) 05/30/2014    HGB 15 0 06/11/2019    HGB 14 8 06/10/2019    HGB 14 5 06/08/2019    HGB 14 6 06/01/2014    HGB 14 9 05/31/2014    HGB 17 0 05/30/2014     06/11/2019  06/10/2019     06/08/2019     (L) 06/01/2014     05/31/2014     05/30/2014     Lab Results   Component Value Date    ALT 56 06/06/2019    ALT 33 05/22/2014    AST 28 06/06/2019    AST 21 05/22/2014     This SmartLink has not been configured with any valid records  PHYSICAL EXAM  General Appearance:    Alert, cooperative, no distress, AAOx3   Head:    Normocephalic without obvious abnormality   Eyes:    PERRL, conjunctiva/corneas clear, EOM's intact        Neck:   Supple, no adenopathy, no JVD   Back:     Symmetric, no spinal or CVA tenderness   Lungs:     Clear to auscultation bilaterally, no wheezing or rhonchi   Heart:    Regular rate and rhythm, S1 and S2 normal, no murmur   Abdomen:     Soft+BS ND NT   Extremities:   Extremities normal  No clubbing, cyanosis or edema   Psych:   Normal Affect, AOx3  Neurologic:  Skin:   CNII-XII intact  Strength symmetric, speech intact    Warm, dry, intact, no visible rashes or lesions         Physical Exam              Some portions of this record may have been generated with voice recognition software  There may be translation, syntax,  or grammatical errors  Occasional wrong word or "sound-a-like" substitutions may have occurred due to the inherent limitations of the voice recognition software  Read the chart carefully and recognize, using context, where substitutions may have occurred  If you have any questions, please contact the dictating provider for clarification or correction, as needed  This encounter has been coded by a non-certified coder

## 2019-09-05 ENCOUNTER — OFFICE VISIT (OUTPATIENT)
Dept: SURGERY | Facility: HOSPITAL | Age: 49
End: 2019-09-05
Payer: COMMERCIAL

## 2019-09-05 VITALS
HEIGHT: 69 IN | DIASTOLIC BLOOD PRESSURE: 95 MMHG | WEIGHT: 238 LBS | BODY MASS INDEX: 35.25 KG/M2 | SYSTOLIC BLOOD PRESSURE: 152 MMHG | HEART RATE: 64 BPM | TEMPERATURE: 98.3 F | RESPIRATION RATE: 16 BRPM

## 2019-09-05 DIAGNOSIS — K57.32 DIVERTICULITIS OF COLON (WITHOUT MENTION OF HEMORRHAGE)(562.11): Primary | ICD-10-CM

## 2019-09-05 DIAGNOSIS — L91.8 CUTANEOUS SKIN TAGS: ICD-10-CM

## 2019-09-05 PROCEDURE — 99213 OFFICE O/P EST LOW 20 MIN: CPT | Performed by: SURGERY

## 2019-09-05 NOTE — H&P (VIEW-ONLY)
Assessment/Plan: Waylon Conde is a 50year old male who presents today regarding diverticulitis of large intestine  He was last seen in the office on 7/15/19  Explained to the patient that he needs colonoscopy for further evaluation before considering and discussing possible elective sigmoid resection  Discussed the risks, benefits and alternatives to a colonoscopy  Explained the procedure as well as pre- and post-operative protocols  The office will schedule him for a colonoscopy  He knows to contact the office if any questions or concerns arise  Skin-  Patient has skin tag on his left gluteus measuring 3 cm x 2 cm  Explained the etiology of skin tags  I will remove the skin tag on the day of his colonoscopy  Discussed the risks, benefits and alternatives to a skin tag removal  Explained the procedure as well as pre- and post- procedural protocol  The office will schedule him for the procedure on the same day as his colonoscopy  No problem-specific Assessment & Plan notes found for this encounter  There are no diagnoses linked to this encounter  Subjective:      Patient ID: Waylon Conde is a 50 y o  male  Waylon Conde is a 50year old male who presents today regarding diverticulitis of large intestine  He was last seen in the office on 7/15/19  He says sometimes when he drives he feels pain  He is eating well and his bowel movements are normal  He still has some abdominal pain  He was in the hospital from June 6th to June 11th  He mentioned that he has skin tag on his glutea as well as under his axillary  The following portions of the patient's history were reviewed and updated as appropriate: allergies, current medications, past family history, past medical history, past social history, past surgical history and problem list     Review of Systems      Objective: There were no vitals taken for this visit  Physical Exam   Skin:   Skin tag on his left gluteus measuring 3 cm x 2 cm     By signing my name below, Elvia Henderson, attest that this documentation has been prepared under the direction and in the presence of Halina Carney MD  Electronically Signed: Cathleen Hoffman  9/5/19  Latanya Espinal, personally performed the services described in this documentation  All medical record entries made by the scribe were at my direction and in my presence  I have reviewed the chart and discharge instructions and agree that the record reflects my personal performance and is accurate and complete  Halina Carney MD  9/5/19

## 2019-09-05 NOTE — PROGRESS NOTES
Assessment/Plan: Cammy Bertrand is a 50year old male who presents today regarding diverticulitis of large intestine  He was last seen in the office on 7/15/19  Explained to the patient that he needs colonoscopy for further evaluation before considering and discussing possible elective sigmoid resection  Discussed the risks, benefits and alternatives to a colonoscopy  Explained the procedure as well as pre- and post-operative protocols  The office will schedule him for a colonoscopy  He knows to contact the office if any questions or concerns arise  Skin-  Patient has skin tag on his left gluteus measuring 3 cm x 2 cm  Explained the etiology of skin tags  I will remove the skin tag on the day of his colonoscopy  Discussed the risks, benefits and alternatives to a skin tag removal  Explained the procedure as well as pre- and post- procedural protocol  The office will schedule him for the procedure on the same day as his colonoscopy  No problem-specific Assessment & Plan notes found for this encounter  There are no diagnoses linked to this encounter  Subjective:      Patient ID: Cammy Bertrand is a 50 y o  male  Cammy Bertrand is a 50year old male who presents today regarding diverticulitis of large intestine  He was last seen in the office on 7/15/19  He says sometimes when he drives he feels pain  He is eating well and his bowel movements are normal  He still has some abdominal pain  He was in the hospital from June 6th to June 11th  He mentioned that he has skin tag on his glutea as well as under his axillary  The following portions of the patient's history were reviewed and updated as appropriate: allergies, current medications, past family history, past medical history, past social history, past surgical history and problem list     Review of Systems      Objective: There were no vitals taken for this visit  Physical Exam   Skin:   Skin tag on his left gluteus measuring 3 cm x 2 cm     By signing my name below, Bhavya Stone, attest that this documentation has been prepared under the direction and in the presence of Mirela Saenz MD  Electronically Signed: Cathleen Hanson  9/5/19  Corazon Lindquist, personally performed the services described in this documentation  All medical record entries made by the scribe were at my direction and in my presence  I have reviewed the chart and discharge instructions and agree that the record reflects my personal performance and is accurate and complete  Mirela Saenz MD  9/5/19

## 2019-09-09 ENCOUNTER — TELEPHONE (OUTPATIENT)
Dept: OBGYN CLINIC | Facility: HOSPITAL | Age: 49
End: 2019-09-09

## 2019-09-09 ENCOUNTER — OFFICE VISIT (OUTPATIENT)
Dept: OBGYN CLINIC | Facility: HOSPITAL | Age: 49
End: 2019-09-09
Payer: OTHER GOVERNMENT

## 2019-09-09 VITALS
SYSTOLIC BLOOD PRESSURE: 158 MMHG | DIASTOLIC BLOOD PRESSURE: 91 MMHG | HEART RATE: 65 BPM | HEIGHT: 69 IN | WEIGHT: 238 LBS | BODY MASS INDEX: 35.25 KG/M2

## 2019-09-09 DIAGNOSIS — G89.29 CHRONIC LEFT-SIDED LOW BACK PAIN WITH LEFT-SIDED SCIATICA: Primary | ICD-10-CM

## 2019-09-09 DIAGNOSIS — Z98.1 S/P LUMBAR FUSION: ICD-10-CM

## 2019-09-09 DIAGNOSIS — M54.42 CHRONIC LEFT-SIDED LOW BACK PAIN WITH LEFT-SIDED SCIATICA: Primary | ICD-10-CM

## 2019-09-09 PROCEDURE — 99214 OFFICE O/P EST MOD 30 MIN: CPT | Performed by: ORTHOPAEDIC SURGERY

## 2019-09-09 RX ORDER — METHYLPREDNISOLONE 4 MG/1
TABLET ORAL
Qty: 21 TABLET | Refills: 0 | Status: SHIPPED | OUTPATIENT
Start: 2019-09-09 | End: 2019-10-14 | Stop reason: ALTCHOICE

## 2019-09-09 NOTE — ASSESSMENT & PLAN NOTE
Patient presents for evaluation of several months duration of severe left-sided lower back pain that radiates to the left knee  Symptoms are worse with activity  He can barely stand up straight  Denies chirag weakness  Does not report incontinence of bowel or bladder  He did have history of previous anterior lumbar interbody fusion at L4-5 and L5-S1    He has been hospitalized recently for diverticulitis but states that this pain is similar in nature to previous pain requiring lumbar fusion surgery performed over 3 years ago      On physical exam he is globally tender to palpation over across the lumbosacral spine  Positive root tension sign with limb extension bilaterally  Foot dorsiflexion strength is 4 to 4+ out of 5 bilaterally  CT imaging demonstrates previous lumbar interbody fusion of L4-5 and L5-S1  Assessment and plan  Severe lumbar radiculopathy with mild weakness with foot dorsiflexion bilaterally  MRI lumbar spine with contrast is warranted given pain and neurological deficit to bilateral lower extremities  Medrol Dosepak is provided    Follow-up after MRI for review and re-evaluation

## 2019-09-09 NOTE — PROGRESS NOTES
Assessment/Plan:    Chronic left-sided low back pain with left-sided sciatica  Patient presents for evaluation of several months duration of severe left-sided lower back pain that radiates to the left knee  Symptoms are worse with activity  He can barely stand up straight  Denies chirag weakness  Does not report incontinence of bowel or bladder  He did have history of previous anterior lumbar interbody fusion at L4-5 and L5-S1    He has been hospitalized recently for diverticulitis but states that this pain is similar in nature to previous pain requiring lumbar fusion surgery performed over 3 years ago      On physical exam he is globally tender to palpation over across the lumbosacral spine  Positive root tension sign with limb extension bilaterally  Foot dorsiflexion strength is 4 to 4+ out of 5 bilaterally  CT imaging demonstrates previous lumbar interbody fusion of L4-5 and L5-S1  Assessment and plan  Severe lumbar radiculopathy with mild weakness with foot dorsiflexion bilaterally  MRI lumbar spine with contrast is warranted given pain and neurological deficit to bilateral lower extremities  Medrol Dosepak is provided  Follow-up after MRI for review and re-evaluation    · Left low back pain with left leg pain  · Lumbar MRI with contrast ordered  · 4/5 strength  · Neurotension signs on exam  · Follow up after lumbar MRI        Problem List Items Addressed This Visit        Nervous and Auditory    Chronic left-sided low back pain with left-sided sciatica - Primary     Patient presents for evaluation of several months duration of severe left-sided lower back pain that radiates to the left knee  Symptoms are worse with activity  He can barely stand up straight  Denies chirag weakness  Does not report incontinence of bowel or bladder    He did have history of previous anterior lumbar interbody fusion at L4-5 and L5-S1    He has been hospitalized recently for diverticulitis but states that this pain is similar in nature to previous pain requiring lumbar fusion surgery performed over 3 years ago      On physical exam he is globally tender to palpation over across the lumbosacral spine  Positive root tension sign with limb extension bilaterally  Foot dorsiflexion strength is 4 to 4+ out of 5 bilaterally  CT imaging demonstrates previous lumbar interbody fusion of L4-5 and L5-S1  Assessment and plan  Severe lumbar radiculopathy with mild weakness with foot dorsiflexion bilaterally  MRI lumbar spine with contrast is warranted given pain and neurological deficit to bilateral lower extremities  Medrol Dosepak is provided  Follow-up after MRI for review and re-evaluation         Relevant Medications    methylPREDNISolone 4 MG tablet therapy pack    Other Relevant Orders    MRI lumbar spine w wo contrast       Other    S/P lumbar fusion    Relevant Medications    methylPREDNISolone 4 MG tablet therapy pack    Other Relevant Orders    MRI lumbar spine w wo contrast            Subjective:      Patient ID: Osmany Crump is a 50 y o  male  HPI   The patient presents for follow up of low back pain  He had been hospitalized for diverticulitis and has had symptoms since  Today he complains of left low back/ buttock pain with left lateral thigh pain to the knee  Walking can aggravate  He has history of lumbar fusion with Dr Montes  The following portions of the patient's history were reviewed and updated as appropriate: allergies, current medications, past family history, past medical history, past social history, past surgical history and problem list     Review of Systems   Constitutional: Negative for chills, fever and unexpected weight change  HENT: Negative for hearing loss, nosebleeds and sore throat  Eyes: Negative for pain, redness and visual disturbance  Respiratory: Negative for cough, shortness of breath and wheezing      Cardiovascular: Negative for chest pain, palpitations and leg swelling  Gastrointestinal: Negative for abdominal pain, nausea and vomiting  Endocrine: Negative for polydipsia and polyuria  Genitourinary: Negative for difficulty urinating and hematuria  Skin: Negative for rash and wound  Psychiatric/Behavioral: Negative for decreased concentration and suicidal ideas  The patient is not nervous/anxious  Objective:      /91   Pulse 65   Ht 5' 9" (1 753 m)   Wt 108 kg (238 lb)   BMI 35 15 kg/m²          Physical Exam   Constitutional: He is oriented to person, place, and time  He appears well-developed and well-nourished  HENT:   Right Ear: External ear normal    Left Ear: External ear normal    Nose: Nose normal    Eyes: Conjunctivae are normal    Neck: Normal range of motion  Pulmonary/Chest: Effort normal    Neurological: He is alert and oriented to person, place, and time  Skin: Skin is warm and dry  Psychiatric: He has a normal mood and affect   His behavior is normal  Judgment and thought content normal        Patient ambulates without assistance  Tender to palpation over left lumbosacral junction  Modified straight leg equivocal, negative right   Strength L2-S1 4/5 bilaterally   Sensation L2-S1 intact bilaterally    Imaging:  Abdomen CT reviewed     Scribe Attestation    I,:   Johnathon Lr am acting as a scribe while in the presence of the attending physician :        I,:   Matt Antonio MD personally performed the services described in this documentation    as scribed in my presence :

## 2019-09-09 NOTE — TELEPHONE ENCOUNTER
Patient is calling to schedule to see Dr Darren Andrade  Patient is reporting increased pain, and what he is calling "complications"  Patient had sx in 2014 with Dr Marifer Kincaid and say Dr Darren Andrade in October of 2016  Patient states that the pain in unbearable  He is having difficulty walking  Patient reports that the pain goes into his left hip and knee  Patient acknowledges referral to pain management, but doesn't believe that is an answer  Patient believes there is something more going on

## 2019-09-24 ENCOUNTER — HOSPITAL ENCOUNTER (OUTPATIENT)
Dept: GASTROENTEROLOGY | Facility: HOSPITAL | Age: 49
Setting detail: OUTPATIENT SURGERY
Discharge: HOME | End: 2019-09-24
Attending: SURGERY | Admitting: SURGERY
Payer: OTHER GOVERNMENT

## 2019-09-24 ENCOUNTER — ANESTHESIA (OUTPATIENT)
Dept: GASTROENTEROLOGY | Facility: HOSPITAL | Age: 49
End: 2019-09-24

## 2019-09-24 ENCOUNTER — ANESTHESIA EVENT (OUTPATIENT)
Dept: GASTROENTEROLOGY | Facility: HOSPITAL | Age: 49
End: 2019-09-24

## 2019-09-24 VITALS
DIASTOLIC BLOOD PRESSURE: 81 MMHG | HEART RATE: 64 BPM | HEIGHT: 69 IN | RESPIRATION RATE: 12 BRPM | WEIGHT: 240 LBS | SYSTOLIC BLOOD PRESSURE: 153 MMHG | TEMPERATURE: 97.6 F | OXYGEN SATURATION: 97 % | BODY MASS INDEX: 35.55 KG/M2

## 2019-09-24 DIAGNOSIS — K57.32 DIVERTICULITIS OF COLON (WITHOUT MENTION OF HEMORRHAGE)(562.11): ICD-10-CM

## 2019-09-24 DIAGNOSIS — L91.8 CUTANEOUS SKIN TAGS: ICD-10-CM

## 2019-09-24 PROCEDURE — 88305 TISSUE EXAM BY PATHOLOGIST: CPT | Performed by: PATHOLOGY

## 2019-09-24 PROCEDURE — 88304 TISSUE EXAM BY PATHOLOGIST: CPT | Performed by: PATHOLOGY

## 2019-09-24 PROCEDURE — 45385 COLONOSCOPY W/LESION REMOVAL: CPT | Performed by: SURGERY

## 2019-09-24 PROCEDURE — 11200 RMVL SKIN TAGS UP TO&INC 15: CPT | Performed by: SURGERY

## 2019-09-24 RX ORDER — SODIUM CHLORIDE 9 MG/ML
75 INJECTION, SOLUTION INTRAVENOUS CONTINUOUS
Status: DISCONTINUED | OUTPATIENT
Start: 2019-09-24 | End: 2019-09-28 | Stop reason: HOSPADM

## 2019-09-24 RX ORDER — PROPOFOL 10 MG/ML
INJECTION, EMULSION INTRAVENOUS AS NEEDED
Status: DISCONTINUED | OUTPATIENT
Start: 2019-09-24 | End: 2019-09-24 | Stop reason: SURG

## 2019-09-24 RX ORDER — LIDOCAINE HYDROCHLORIDE 10 MG/ML
INJECTION, SOLUTION EPIDURAL; INFILTRATION; INTRACAUDAL; PERINEURAL
Status: DISCONTINUED
Start: 2019-09-24 | End: 2019-09-24 | Stop reason: WASHOUT

## 2019-09-24 RX ADMIN — PROPOFOL 20 MG: 10 INJECTION, EMULSION INTRAVENOUS at 08:02

## 2019-09-24 RX ADMIN — PROPOFOL 40 MG: 10 INJECTION, EMULSION INTRAVENOUS at 07:52

## 2019-09-24 RX ADMIN — PROPOFOL 30 MG: 10 INJECTION, EMULSION INTRAVENOUS at 07:49

## 2019-09-24 RX ADMIN — PROPOFOL 20 MG: 10 INJECTION, EMULSION INTRAVENOUS at 07:54

## 2019-09-24 RX ADMIN — PROPOFOL 40 MG: 10 INJECTION, EMULSION INTRAVENOUS at 07:40

## 2019-09-24 RX ADMIN — PROPOFOL 30 MG: 10 INJECTION, EMULSION INTRAVENOUS at 07:46

## 2019-09-24 RX ADMIN — PROPOFOL 40 MG: 10 INJECTION, EMULSION INTRAVENOUS at 07:55

## 2019-09-24 RX ADMIN — SODIUM CHLORIDE: 0.9 INJECTION, SOLUTION INTRAVENOUS at 07:29

## 2019-09-24 RX ADMIN — PROPOFOL 100 MG: 10 INJECTION, EMULSION INTRAVENOUS at 07:35

## 2019-09-24 RX ADMIN — PROPOFOL 40 MG: 10 INJECTION, EMULSION INTRAVENOUS at 08:00

## 2019-09-24 RX ADMIN — PROPOFOL 40 MG: 10 INJECTION, EMULSION INTRAVENOUS at 07:42

## 2019-09-24 RX ADMIN — PROPOFOL 40 MG: 10 INJECTION, EMULSION INTRAVENOUS at 07:39

## 2019-09-24 RX ADMIN — PROPOFOL 20 MG: 10 INJECTION, EMULSION INTRAVENOUS at 07:38

## 2019-09-24 NOTE — ANESTHESIA PREPROCEDURE EVALUATION
Review of Systems/Medical History  Patient summary reviewed  Chart reviewed      Cardiovascular  Negative cardio ROS Hypertension controlled,    Pulmonary  Negative pulmonary ROS Smoker cigarette smoker  , Tobacco cessation counseling given Cumulative Pack Years: 61, Sleep apnea ,   Comment: Suspected MICHELE     GI/Hepatic  Negative GI/hepatic ROS          Negative  ROS        Endo/Other  Negative endo/other ROS   Obesity    GYN  Negative gynecology ROS          Hematology  Negative hematology ROS      Musculoskeletal  Negative musculoskeletal ROS   Comment: S/p lumbar fusion      Neurology  Negative neurology ROS      Psychology   Negative psychology ROS   Chronic pain,            Physical Exam    Airway    Mallampati score: II  TM Distance: >3 FB  Neck ROM: full     Dental   No notable dental hx     Cardiovascular  Comment: Negative ROS, Rhythm: regular, Rate: normal, Cardiovascular exam normal    Pulmonary  Pulmonary exam normal Breath sounds clear to auscultation,     Other Findings        Anesthesia Plan  ASA Score- 2     Anesthesia Type- IV sedation with anesthesia with ASA Monitors  Additional Monitors:   Airway Plan:         Plan Factors-    Induction- intravenous  Postoperative Plan-     Informed Consent- Anesthetic plan and risks discussed with patient  I personally reviewed this patient with the CRNA  Discussed and agreed on the Anesthesia Plan with the CRNA  Sammi Dawson

## 2019-09-24 NOTE — DISCHARGE INSTRUCTIONS
[facility]  Endoscopy Post-Operative Instructions  Cary Callaway MD    Procedure: Colonoscopy and Polypectomy, excision of skin tag    Findings:  Diverticulosis, Hemorrhoids and Polyp(s), skin tag    Follow-Up: Follow-up Colonoscopy is indicated  You will need a repeat Colonoscopy in 3 years  You will receive a call from our office with your results, in addition to the the preliminary results you received today  You are welcome to also schedule an office visit if desired to discuss the results further  If a follow up endoscopy is needed, you are responsible for arranging that follow up appointment at the appropriate time  The office may or may not issue a reminder at that future time  Please take responsibility for your own follow up healthcare  Diet: Eat a light snack first, and then resume your previous diet  Medications: Continue taking her established medications  Do not take aspirin or blood thinning medications for 2 days following procedure  Tylenol is okay  You may have been given a new medication  Please take this (usually an anti-ulcer -type medication) and see your primary care doctor for refills and follow up medications if needed       After the test: Notify physician for arm swelling or pain at the intravenous site  You may have some abdominal discomfort following the procedure  Belching or passing flatus will help relieve it  Following upper endoscopy, you may experience a temporary sore throat  Saline gargles or lozenges can be taken to relieve sore throat Following lower endoscopy, minor rectal bleeding is not uncommon      Activity: Do not drive a car, operate machinery, or sign legal documents for 24 hours after your procedure   Normal activity may be resumed on the day following the procedure      Call the office at  [phone] for any of the following: Severe abdominal pain, significant rectal bleeding, chills, or fever above 100°, new onset of persistent cough or persistent vomiting

## 2019-09-24 NOTE — DISCHARGE SUMMARY
Discharge Summary - Sara Beck 52 y o  male MRN: [id number]    Unit/Bed#:  Encounter: [id number]      * No Diagnosis Codes entered *  * No Diagnosis Codes entered *      Procedures Performed:colonoscopy with biopsies and excision of skin tag      See H and P for full details of admission and op note  Patient was seen and examined prior to discharge  Provisions for Follow-Up Care:  See after visit summary for information related to follow-up care and any pertinent home health orders  Disposition: Home, in stable condition  Planned Readmission: No    Discharge Medications:  See after visit summary for reconciled discharge medications provided to patient and family  Post op instructions reviewed with patient and/or family  Rx given for discharge  Pt  discharge in improved and good condition      Signature:   Errol Meeks MD  Date: 9/24/2019 Time: 8:13 AM

## 2019-09-24 NOTE — INTERVAL H&P NOTE
H&P reviewed  After examining the patient I find no changes in the patients condition since the H&P had been written      Vitals:    09/24/19 0643   BP: 139/77   Pulse: 67   Resp: 18   Temp: 97 8 °F (36 6 °C)   SpO2: 96%     PE: Lungs CTA  CVS: S1S2

## 2019-09-24 NOTE — INTERVAL H&P NOTE
H&P reviewed  After examining the patient I find no changes in the patients condition since the H&P had been written      Vitals:    09/24/19 0643   BP: 139/77   Pulse: 67   Resp: 18   Temp: 97 8 °F (36 6 °C)   SpO2: 96%   ROS: neg

## 2019-09-24 NOTE — INTERVAL H&P NOTE
H&P reviewed  After examining the patient I find no changes in the patients condition since the H&P had been written      Vitals:    09/24/19 0643   BP: 139/77   Pulse: 67   Resp: 18   Temp: 97 8 °F (36 6 °C)   SpO2: 96%

## 2019-10-04 ENCOUNTER — TELEPHONE (OUTPATIENT)
Dept: OBGYN CLINIC | Facility: HOSPITAL | Age: 49
End: 2019-10-04

## 2019-10-04 NOTE — TELEPHONE ENCOUNTER
Spoke to patient about his appointment with Dr Amelie Hughes 10/7  Did not have MRI due to the [address] [facility] not issuing an authorization  [facility]  The patient also needs an authorization for Orthopedics  He will obtain the disk of the MRI and get authorization for Orthopedics  The patient will call us to reschedule appointment with Dr Amelie Hughes

## 2019-10-14 ENCOUNTER — OFFICE VISIT (OUTPATIENT)
Dept: SURGERY | Facility: HOSPITAL | Age: 49
End: 2019-10-14

## 2019-10-14 VITALS — HEIGHT: 69 IN | WEIGHT: 235.6 LBS | TEMPERATURE: 98.8 F | BODY MASS INDEX: 34.9 KG/M2

## 2019-10-14 DIAGNOSIS — Z09 POSTOP CHECK: Primary | ICD-10-CM

## 2019-10-14 PROCEDURE — 99024 POSTOP FOLLOW-UP VISIT: CPT | Performed by: SURGERY

## 2019-10-14 NOTE — PROGRESS NOTES
Assessment/Plan: Ellis Butcher is a 52year old male who presents today status post skin tag excision and colonoscopy done on 9/24/19  Explained the etiology of his tissue exam  He should have a repeat colonoscopy in three years  Explained to the patient that his diverticular disease is at one area of the colon  Patient has small microperforation   Explained to him that there is a chance that he might have another diverticulitis attack  Discussed the risks, benefits, and alternatives to laparoscopic elective sigmoid resection  Explained the procedure as well as pre- and post-procedural protocols  Lifting restrictions of no greater than 15 pounds and no strenuous activity for 2 weeks after surgery  No heavy lifting of greater than 25 pounds for weeks 3 and 4  He should not drive or return to work while he is taking the narcotics  Patient would not like to have the procedure done at this time because he has no symptoms  He is going to call the office if his symptoms are worsen and he has another diverticulitis attack  Pathology showed: Colon polyp, hepatic flexure at 95 cm, endoscopic polypectomy: sessile serrated adenoma/polyp, submucosal lipoma, negative for high grade dysplasia  Sigmoid colon polyp at 30 cm, endoscopic polypectomy: submucosal lipoma, no epithelial dysplasia and no evidence of malignancy  Skin tag, left buttock, excision: fibroepithelial           No problem-specific Assessment & Plan notes found for this encounter  There are no diagnoses linked to this encounter  Subjective:      Patient ID: Ellis Butcher is a 52 y o  male  Ellis Butcher is a 52year old male who presents today status post skin tag excision and colonoscopy done on 9/24/19  He states that he is doing well  He states that he had one  diverticulitis attack  He states that he feels normal at this time, he has not had any attacks after the first one   He states that he would not like to have surgery at this time since his symptoms are improving but he knows that his symptoms might return  The following portions of the patient's history were reviewed and updated as appropriate: allergies, current medications, past family history, past medical history, past social history, past surgical history and problem list     Review of Systems   Constitutional: Negative  HENT: Negative  Eyes: Negative  Respiratory: Negative  Cardiovascular: Negative  Gastrointestinal: Negative  Endocrine: Negative  Genitourinary: Negative  Musculoskeletal: Negative  Skin: Negative  Allergic/Immunologic: Negative  Neurological: Negative  Hematological: Negative  Psychiatric/Behavioral: Negative  Objective: There were no vitals taken for this visit  Physical Exam   Constitutional: He is oriented to person, place, and time  He appears well-developed and well-nourished  No distress  HENT:   Head: Normocephalic and atraumatic  Right Ear: External ear normal    Left Ear: External ear normal    Nose: Nose normal    Mouth/Throat: Oropharynx is clear and moist    Eyes: Pupils are equal, round, and reactive to light  Conjunctivae and EOM are normal    Neck: Normal range of motion  Neck supple  No JVD present  No tracheal deviation present  No thyromegaly present  Cardiovascular: Normal rate, regular rhythm, normal heart sounds and intact distal pulses  Exam reveals no gallop and no friction rub  No murmur heard  Pulmonary/Chest: Effort normal and breath sounds normal  No stridor  No respiratory distress  He has no wheezes  He has no rales  He exhibits no tenderness  Abdominal: Soft  Bowel sounds are normal  He exhibits no distension and no mass  There is no tenderness  There is no rebound and no guarding  No hernia  Musculoskeletal: Normal range of motion  He exhibits no edema, tenderness or deformity  Lymphadenopathy:     He has no cervical adenopathy     Neurological: He is alert and oriented to person, place, and time  No cranial nerve deficit  Coordination normal    Skin: Skin is warm and dry  No rash noted  He is not diaphoretic  No erythema  No pallor  Wound on his neck is healing well    Psychiatric: He has a normal mood and affect  His behavior is normal  Judgment and thought content normal    Nursing note and vitals reviewed  By signing my name below, Cassandra Montero, attest that this documentation has been prepared under the direction and in the presence of Titi Bernard MD  Electronically Signed: Mica Guthrie  10/14/19  Anahi Martinez, personally performed the services described in this documentation  All medical record entries made by the mica were at my direction and in my presence  I have reviewed the chart and discharge instructions and agree that the record reflects my personal performance and is accurate and complete  Titi Bernard MD  10/14/19

## 2019-11-01 ENCOUNTER — TELEPHONE (OUTPATIENT)
Dept: OBGYN CLINIC | Facility: HOSPITAL | Age: 49
End: 2019-11-01

## 2019-11-01 NOTE — TELEPHONE ENCOUNTER
Patient has an appointment on 11/4/19 with Dr Kenneth Mao @ 1:00pm  No authorization was found in the chart for ortho  P/C to patient who states that he thought this visit had been authorized  Tried to contact [facility] and finally was able to speak to a person, Monet Babb  She told me that the authorization was started on 10/23/19 but that it is not complete  She told me that she would leave a note for the person who handles ortho authorizations [facility]) to try to complete the authorization Monday morning  I asked Monet Babb to please have the [place] faxed ASA  I gave our fax and phone number to Monet Babb  According to Monet Babb, the phone number for Vik Curry is [phone] ext  [phone]  I called the patient to inform him of all of the above  He said that he will call us before he comes to the appointment to see if we received the authorization

## 2019-11-04 ENCOUNTER — OFFICE VISIT (OUTPATIENT)
Dept: OBGYN CLINIC | Facility: HOSPITAL | Age: 49
End: 2019-11-04
Payer: COMMERCIAL

## 2019-11-04 VITALS
HEART RATE: 66 BPM | WEIGHT: 243 LBS | SYSTOLIC BLOOD PRESSURE: 144 MMHG | BODY MASS INDEX: 35.99 KG/M2 | HEIGHT: 69 IN | DIASTOLIC BLOOD PRESSURE: 68 MMHG

## 2019-11-04 DIAGNOSIS — G89.29 CHRONIC LEFT-SIDED LOW BACK PAIN WITH LEFT-SIDED SCIATICA: ICD-10-CM

## 2019-11-04 DIAGNOSIS — Z98.1 S/P LUMBAR FUSION: Primary | ICD-10-CM

## 2019-11-04 DIAGNOSIS — M54.42 CHRONIC LEFT-SIDED LOW BACK PAIN WITH LEFT-SIDED SCIATICA: ICD-10-CM

## 2019-11-04 PROCEDURE — 99213 OFFICE O/P EST LOW 20 MIN: CPT | Performed by: ORTHOPAEDIC SURGERY

## 2019-11-04 NOTE — PROGRESS NOTES
Assessment/Plan:    Patient seen examined with Dr Andre Dickens  MRI of the lumbar spine demonstrates stable anterior fusion L4-S1  There is no significant central or foraminal stenosis  There is facet arthropathy at several levels  From our standpoint, no surgical intervention is warranted at this time  Our recommendation would be referral to pain management for facet injections at L3-4 followed by left SI joint injection  He can follow up with us on a p r n  Basis  He can call the office with any questions or concerns  Problem List Items Addressed This Visit        Nervous and Auditory    Chronic left-sided low back pain with left-sided sciatica       Other    S/P lumbar fusion - Primary            Subjective:      Patient ID: Lenin Sanchez is a 52 y o  male  HPI     The patient is a 51-year-old male presents for a follow-up appointment  He was last seen about two months ago and instructed to go for an MRI of the lumbar spine for further evaluation  Today, the patient states that he has no new issues  His constant lower back pain with radiation to the left buttock and anterior thigh to the knee is still present and excruciating  He has minimal right-sided symptoms  He denies any bowel or bladder incontinence, no saddle anesthesia  He has never tried injections for this problem before  The following portions of the patient's history were reviewed and updated as appropriate: allergies, current medications, past family history, past medical history, past social history, past surgical history and problem list     Review of Systems   Constitutional: Positive for activity change  Negative for chills, diaphoresis, fatigue and fever  Respiratory: Negative  Cardiovascular: Negative  Gastrointestinal: Negative  Genitourinary: Negative for decreased urine volume and difficulty urinating  Musculoskeletal: Positive for arthralgias and back pain  Skin: Negative      Neurological: Positive for weakness and numbness  Objective:      /68   Pulse 66   Ht 5' 9" (1 753 m)   Wt 110 kg (243 lb)   BMI 35 88 kg/m²          Physical Exam   Constitutional: He is oriented to person, place, and time  He appears well-developed and well-nourished  No distress  HENT:   Head: Normocephalic and atraumatic  Eyes: Right eye exhibits no discharge  Pulmonary/Chest: Effort normal  No respiratory distress  Abdominal: Soft  He exhibits no distension  Musculoskeletal: He exhibits tenderness  Neurological: He is alert and oriented to person, place, and time  Skin: Skin is warm and dry  He is not diaphoretic  Psychiatric: He has a normal mood and affect  Nursing note and vitals reviewed  No acute distress  Gait is slow and wide-based  Inspection open wounds or erythema  Lumbosacral region SI joints are tender to palpation  Equivocal modified straight leg raise bilaterally  Strength testing is guarded however the patient is motor and sensory stable L2-S1 bilaterally  Lower extremities are warm and well perfused

## 2019-11-22 ENCOUNTER — TELEPHONE (OUTPATIENT)
Dept: PAIN MEDICINE | Facility: CLINIC | Age: 49
End: 2019-11-22

## 2019-11-26 ENCOUNTER — APPOINTMENT (OUTPATIENT)
Dept: RADIOLOGY | Facility: CLINIC | Age: 49
End: 2019-11-26
Payer: OTHER GOVERNMENT

## 2019-11-26 ENCOUNTER — CONSULT (OUTPATIENT)
Dept: PAIN MEDICINE | Facility: CLINIC | Age: 49
End: 2019-11-26
Payer: COMMERCIAL

## 2019-11-26 VITALS
SYSTOLIC BLOOD PRESSURE: 140 MMHG | DIASTOLIC BLOOD PRESSURE: 96 MMHG | HEIGHT: 69 IN | HEART RATE: 80 BPM | WEIGHT: 242 LBS | BODY MASS INDEX: 35.84 KG/M2

## 2019-11-26 DIAGNOSIS — M47.816 LUMBAR SPONDYLOSIS: ICD-10-CM

## 2019-11-26 DIAGNOSIS — M25.552 LEFT HIP PAIN: ICD-10-CM

## 2019-11-26 DIAGNOSIS — M46.1 SACROILIITIS (HCC): Primary | ICD-10-CM

## 2019-11-26 PROCEDURE — 99244 OFF/OP CNSLTJ NEW/EST MOD 40: CPT | Performed by: ANESTHESIOLOGY

## 2019-11-26 PROCEDURE — 73502 X-RAY EXAM HIP UNI 2-3 VIEWS: CPT

## 2019-11-26 NOTE — PROGRESS NOTES
Assessment  1  Sacroiliitis ([facility] [address] )    2  Lumbar spondylosis    3  Left hip pain        Plan    The patient's low back pain persists despite time, relative rest, activity modification and therapy  Based on the patient's symptoms and examination, I suspect that their pain is being generated by the sacroiliac joint  I will schedule the patient for intra-articular sacroiliac joint steroid injection under fluoroscopic guidance to address any inflammatory component of the pain  This would serve both diagnostic and hopefully therapeutic purposes  If the patient does not receive significant relief following the injection, it is possible that there is another pain source as the sacroiliac joint is a common pain referral site  It is also possible that the pain is being manifested by an extra-articular sacroiliac pain generator which would not be addressed with an intra-articular injection  If the patient does not obtain significant reduction symptoms 1 May consider his pain being generated by the lumbar facet joints would consider diagnostic medial branch blocks and if positive he would be a candidate for radiofrequency  This was also discussed with the patient and provided literature for home review  In the office today, we reviewed the nature of the patient's pathology in depth using  diagrams and models  We discussed the approach I would use for the sacroiliac joint injection and provided literature for home review  The patient understands the risks associated with the procedure including bleeding, infection, tissue injury, allergic reaction and paralysis and provided written and verbal consent in the office today  My impressions and treatment recommendations were discussed in detail with the patient who verbalized understanding and had no further questions  Discharge instructions were provided  I personally saw and examined the patient and I agree with the above discussed plan of care    This note is created using dictation transcription  It may contain typographical errors, grammatical errors, improperly dictated words, background noise and other errors  Orders Placed This Encounter   Procedures    FL spine and pain procedure     Standing Status:   Future     Standing Expiration Date:   11/26/2023     Order Specific Question:   Reason for Exam:     Answer:   Left SI joint     Order Specific Question:   Anticoagulant hold needed? Answer:   no    FL spine and pain procedure     Standing Status:   Future     Standing Expiration Date:   11/26/2023     Order Specific Question:   Reason for Exam:     Answer:   b/l L2,3,4 MBB#1  (2nd Procedure)     Order Specific Question:   Anticoagulant hold needed? Answer:   no    XR hip/pelv 2-3 vws left if performed     Standing Status:   Future     Standing Expiration Date:   11/26/2023     Scheduling Instructions:      Bring along any outside films relating to this procedure  No orders of the defined types were placed in this encounter  REFERRED BY DR Marquis Shah    History of Present Illness    Carole Gomez is a 52 y o  male   With a longstanding history of chronic low back pain from his service in the [employer]  He eventually had a two level ALIF at L4-5 and L5-S1  He has had persistent low back pain since which she describes as 10/10 is constant throughout the day completely interfering with daily living activities  His burning sensation with sharp achy sensation in his low back with subjective weakness lower limbs  Most activities aggravate his symptoms will nothing seems to reduce it  Chiropractic treatment provided moderate relief physical therapy heat nice provided no relief  He denies bowel or bladder dysfunction  I have personally reviewed and/or updated the patient's past medical history, past surgical history, family history, social history, current medications, allergies, and vital signs today       Review of Systems   Constitutional: Positive for unexpected weight change  Negative for fever  HENT: Negative for trouble swallowing  Eyes: Negative for visual disturbance  Respiratory: Negative for shortness of breath and wheezing  Cardiovascular: Negative for chest pain and palpitations  Gastrointestinal: Negative for constipation, diarrhea, nausea and vomiting  Endocrine: Negative for cold intolerance, heat intolerance and polydipsia  Genitourinary: Negative for difficulty urinating and frequency  Musculoskeletal: Positive for gait problem (difficulty walking decreased rom) and joint swelling (joint stiffness)  Negative for arthralgias and myalgias  Skin: Negative for rash  Neurological: Positive for weakness  Negative for dizziness, seizures, syncope and headaches  Hematological: Does not bruise/bleed easily  Psychiatric/Behavioral: Negative for dysphoric mood  All other systems reviewed and are negative  Patient Active Problem List   Diagnosis    Diverticulitis of large intestine with perforation and abscess without bleeding    Chronic left-sided low back pain with left-sided sciatica    S/P lumbar fusion       Past Medical History:   Diagnosis Date    Chronic pain     Hypertension        Past Surgical History:   Procedure Laterality Date    BACK SURGERY         Family History   Problem Relation Age of Onset    Hypertension Father     Cancer Paternal Grandfather        Social History     Occupational History    Not on file   Tobacco Use    Smoking status: Current Every Day Smoker     Packs/day: 2 00     Types: Cigarettes    Smokeless tobacco: Never Used   Substance and Sexual Activity    Alcohol use: Yes     Frequency: 2-4 times a month     Drinks per session: 3 or 4     Binge frequency: Less than monthly     Comment: social    Drug use: Never    Sexual activity: Never       No current outpatient medications on file prior to visit       No current facility-administered medications on file prior to visit  No Known Allergies    Physical Exam    /96   Pulse 80   Ht 5' 9" (1 753 m)   Wt 110 kg (242 lb)   BMI 35 74 kg/m²     Constitutional: normal, well developed, well nourished, alert, in no distress and non-toxic and no overt pain behavior  and overweight  Eyes: anicteric  HEENT: grossly intact  Neck: supple, symmetric, trachea midline and no masses   Pulmonary:even and unlabored  Cardiovascular:No edema or pitting edema present  Skin:Normal without rashes or lesions and well hydrated  Psychiatric:Mood and affect appropriate  Neurologic:Cranial Nerves II-XII grossly intact  Musculoskeletal:normal,   Slight difficulty going from sitting to standing sitting position no obvious skin lesions or erythema lumbar sacral spine deep tendon reflexes symmetrical bilateral lower limbs is no focal motor deficit appreciated lower limbs though it does cause pain appreciate of  Sensory deficits of the lower limbs positive pain to palpation left PSIS and there is positive Tremayne's maneuver on the left    Imaging  MRI LUMBAR SPINE 10/18/19 [facility]     IMPRESSION     No spinal canal or foraminal stenosis    No focal disc herniations, Generalized disc bulge with central annular tear at L3-4 and minimal generalized posteriorly bulging disc osteophyte complex at L5-S1  Mildly prominent epidural fat     I have personally reviewed pertinent films in PACS

## 2019-12-03 ENCOUNTER — HOSPITAL ENCOUNTER (OUTPATIENT)
Dept: RADIOLOGY | Facility: CLINIC | Age: 49
Discharge: HOME | End: 2019-12-03
Attending: ANESTHESIOLOGY | Admitting: ANESTHESIOLOGY
Payer: OTHER GOVERNMENT

## 2019-12-03 VITALS
OXYGEN SATURATION: 96 % | TEMPERATURE: 98.9 F | RESPIRATION RATE: 18 BRPM | SYSTOLIC BLOOD PRESSURE: 164 MMHG | DIASTOLIC BLOOD PRESSURE: 85 MMHG | HEART RATE: 73 BPM

## 2019-12-03 DIAGNOSIS — M46.1 SACROILIITIS (HCC): ICD-10-CM

## 2019-12-03 PROCEDURE — 27096 INJECT SACROILIAC JOINT: CPT | Performed by: ANESTHESIOLOGY

## 2019-12-03 RX ORDER — METHYLPREDNISOLONE ACETATE 80 MG/ML
80 INJECTION, SUSPENSION INTRA-ARTICULAR; INTRALESIONAL; INTRAMUSCULAR; PARENTERAL; SOFT TISSUE ONCE
Status: COMPLETED | OUTPATIENT
Start: 2019-12-03 | End: 2019-12-03

## 2019-12-03 RX ORDER — LIDOCAINE HYDROCHLORIDE 10 MG/ML
5 INJECTION, SOLUTION EPIDURAL; INFILTRATION; INTRACAUDAL; PERINEURAL ONCE
Status: COMPLETED | OUTPATIENT
Start: 2019-12-03 | End: 2019-12-03

## 2019-12-03 RX ADMIN — IOHEXOL 1 ML: 300 INJECTION, SOLUTION INTRAVENOUS at 13:27

## 2019-12-03 RX ADMIN — LIDOCAINE HYDROCHLORIDE 3 ML: 10 INJECTION, SOLUTION EPIDURAL; INFILTRATION; INTRACAUDAL; PERINEURAL at 13:27

## 2019-12-03 RX ADMIN — METHYLPREDNISOLONE ACETATE 80 MG: 80 INJECTION, SUSPENSION INTRA-ARTICULAR; INTRALESIONAL; INTRAMUSCULAR; SOFT TISSUE at 13:29

## 2019-12-03 RX ADMIN — LIDOCAINE HYDROCHLORIDE 2 ML: 20 INJECTION, SOLUTION EPIDURAL; INFILTRATION; INTRACAUDAL at 13:28

## 2019-12-03 NOTE — DISCHARGE INSTRUCTIONS
Steroid Joint Injection   WHAT YOU NEED TO KNOW:   A steroid joint injection is a procedure to inject steroid medicine into a joint  Steroid medicine decreases pain and inflammation  The injection may also contain an anesthetic (numbing medicine) to decrease pain  It may be done to treat conditions such as arthritis, gout, or carpal tunnel syndrome  The injections may be given in your knee, ankle, shoulder, elbow, wrist, ankle or sacroiliac joint  1  Do not apply heat to any area that is numb  If you have discomfort or soreness at the injection site, you may apply ice today, 20 minutes on and 20 minutes off  Tomorrow you may use ice or warm, moist heat  Do not apply ice or heat directly to the skin  2  You may have an increase or change in the discomfort for 36-48 hours after your treatment  Apply ice and continue with any pain medicine you have been prescribed  3  Do not do anything strenuous today  You may shower, but no tub baths or hot tubs today  You may resume your normal activities tomorrow, but do not overdo it  Resume normal activities slowly when you are feeling better  4  If you experience redness, drainage or swelling at the injection site, or if you develop a fever above 100 degrees, please call [facility] at [phone] or go to the Emergency Room  5  Continue to take all routine medicines prescribed by your primary care physician unless otherwise instructed by our staff  Most blood thinners should be started again according to your regularly scheduled dosing  If you have any questions, please give our office a call  If you have a problem specifically related to your procedure, please call our office at [phone]  Problems not related to your procedure should be directed to your primary care physician

## 2019-12-03 NOTE — H&P
History of Present Illness: The patient is a 52 y o  male who presents with complaints of left-sided low back pain  Patient Active Problem List   Diagnosis    Diverticulitis of large intestine with perforation and abscess without bleeding    Chronic left-sided low back pain with left-sided sciatica    S/P lumbar fusion       Past Medical History:   Diagnosis Date    Chronic pain     Hypertension        Past Surgical History:   Procedure Laterality Date    BACK SURGERY         No current outpatient medications on file  Current Facility-Administered Medications:     iohexol (OMNIPAQUE) 300 mg/mL injection 50 mL, 50 mL, Intra-articular, Once, Flex Zamudio,     lidocaine (PF) (XYLOCAINE-MPF) 1 % injection 5 mL, 5 mL, Other, Once, Flex Zamudio,     lidocaine (PF) (XYLOCAINE-MPF) 2 % injection 5 mL, 5 mL, Intra-articular, Once, Flex Zamudio DO    methylPREDNISolone acetate (DEPO-MEDROL) injection 80 mg, 80 mg, Intra-articular, Once, Flex Zamudio,     No Known Allergies    Physical Exam: There were no vitals filed for this visit  General: Awake, Alert, Oriented x 3, Mood and affect appropriate  Respiratory: Respirations even and unlabored  Cardiovascular: Peripheral pulses intact; no edema  Musculoskeletal Exam:  Decreased range of motion lumbar spine    ASA Score: II    Patient/Chart Verification  Patient ID Verified: Verbal  ID Band Applied: No  Consents Confirmed: To be obtained in the Pre-Procedure area  H&P( within 30 days) Verified: To be obtained in the Pre-Procedure area  Interval H&P(within 24 hr) Complete (required for Outpatients and Surgery Admit only): To be obtained in the Pre-Procedure area  Allergies Reviewed: Yes  Anticoag/NSAID held?: NA  Currently on antibiotics?: No  Pregnancy denied?: NA  Beta Blocker given : N/A    Assessment:   1   Sacroiliitis ([facility] [place] [address] )        Plan: Left SI joint

## 2019-12-10 ENCOUNTER — TELEPHONE (OUTPATIENT)
Dept: PAIN MEDICINE | Facility: CLINIC | Age: 49
End: 2019-12-10

## 2019-12-17 ENCOUNTER — HOSPITAL ENCOUNTER (OUTPATIENT)
Dept: RADIOLOGY | Facility: CLINIC | Age: 49
Discharge: HOME | End: 2019-12-17
Attending: ANESTHESIOLOGY
Payer: OTHER GOVERNMENT

## 2019-12-17 VITALS
OXYGEN SATURATION: 98 % | TEMPERATURE: 98.4 F | SYSTOLIC BLOOD PRESSURE: 131 MMHG | HEART RATE: 73 BPM | DIASTOLIC BLOOD PRESSURE: 81 MMHG | RESPIRATION RATE: 18 BRPM

## 2019-12-17 DIAGNOSIS — M47.816 LUMBAR SPONDYLOSIS: ICD-10-CM

## 2019-12-17 PROCEDURE — 64493 INJ PARAVERT F JNT L/S 1 LEV: CPT | Performed by: ANESTHESIOLOGY

## 2019-12-17 PROCEDURE — 64494 INJ PARAVERT F JNT L/S 2 LEV: CPT | Performed by: ANESTHESIOLOGY

## 2019-12-17 RX ORDER — BUPIVACAINE HCL/PF 2.5 MG/ML
10 VIAL (ML) INJECTION ONCE
Status: COMPLETED | OUTPATIENT
Start: 2019-12-17 | End: 2019-12-17

## 2019-12-17 RX ADMIN — BUPIVACAINE HYDROCHLORIDE 6 ML: 2.5 INJECTION, SOLUTION EPIDURAL; INFILTRATION; INTRACAUDAL at 09:03

## 2019-12-17 NOTE — H&P
History of Present Illness: The patient is a 52 y o  male who presents with complaints of low back pain  Patient Active Problem List   Diagnosis    Diverticulitis of large intestine with perforation and abscess without bleeding    Chronic left-sided low back pain with left-sided sciatica    S/P lumbar fusion    Sacroiliitis ([facility] [place] [address] )       Past Medical History:   Diagnosis Date    Chronic pain     Hypertension        Past Surgical History:   Procedure Laterality Date    BACK SURGERY         No current outpatient medications on file  Current Facility-Administered Medications:     bupivacaine (PF) (MARCAINE) 0 25 % injection 10 mL, 10 mL, Perineural, Once, Flex Zamudio, DO    No Known Allergies    Physical Exam:   Vitals:    12/17/19 0856   BP: 138/82   Pulse: 65   Resp: 18   Temp: 98 4 °F (36 9 °C)   SpO2: 98%     General: Awake, Alert, Oriented x 3, Mood and affect appropriate  Respiratory: Respirations even and unlabored  Cardiovascular: Peripheral pulses intact; no edema  Musculoskeletal Exam:  Pain with lumbar extension    ASA Score: II    Patient/Chart Verification  Patient ID Verified: Verbal  ID Band Applied: No  Consents Confirmed: Procedural, To be obtained in the Pre-Procedure area  H&P( within 30 days) Verified: To be obtained in the Pre-Procedure area  Allergies Reviewed: Yes  Anticoag/NSAID held?: No  Currently on antibiotics?: No    Assessment:   1   Lumbar spondylosis        Plan: b/l L2,3,4 MBB#1  (2nd Procedure)

## 2019-12-17 NOTE — DISCHARGE INSTR - LAB

## 2019-12-20 ENCOUNTER — TELEPHONE (OUTPATIENT)
Dept: PAIN MEDICINE | Facility: CLINIC | Age: 49
End: 2019-12-20

## 2019-12-20 NOTE — TELEPHONE ENCOUNTER
S/w pt, confirmed no relief with mbb  Offered an ov w/ DG to discuss  Pt verscheduled for 12/27 at 1430 to discuss

## 2019-12-20 NOTE — TELEPHONE ENCOUNTER
----- Message from Sosa Christian DO sent at 12/18/2019 11:04 AM EST -----  Please confirm patient has no relief with the diagnostic medial branch block recommend follow-up office visit discuss options with [facility]

## 2019-12-27 ENCOUNTER — OFFICE VISIT (OUTPATIENT)
Dept: PAIN MEDICINE | Facility: CLINIC | Age: 49
End: 2019-12-27
Payer: OTHER GOVERNMENT

## 2019-12-27 VITALS
HEART RATE: 78 BPM | DIASTOLIC BLOOD PRESSURE: 78 MMHG | BODY MASS INDEX: 36.43 KG/M2 | HEIGHT: 69 IN | WEIGHT: 246 LBS | SYSTOLIC BLOOD PRESSURE: 142 MMHG

## 2019-12-27 DIAGNOSIS — M47.816 LUMBAR SPONDYLOSIS: ICD-10-CM

## 2019-12-27 DIAGNOSIS — M54.42 CHRONIC LEFT-SIDED LOW BACK PAIN WITH LEFT-SIDED SCIATICA: ICD-10-CM

## 2019-12-27 DIAGNOSIS — G89.29 CHRONIC LEFT-SIDED LOW BACK PAIN WITH LEFT-SIDED SCIATICA: ICD-10-CM

## 2019-12-27 DIAGNOSIS — G89.29 HIP PAIN, CHRONIC, LEFT: ICD-10-CM

## 2019-12-27 DIAGNOSIS — G89.4 CHRONIC PAIN SYNDROME: Primary | ICD-10-CM

## 2019-12-27 DIAGNOSIS — M25.552 HIP PAIN, CHRONIC, LEFT: ICD-10-CM

## 2019-12-27 DIAGNOSIS — Z98.1 S/P LUMBAR FUSION: ICD-10-CM

## 2019-12-27 DIAGNOSIS — M16.12 PRIMARY OSTEOARTHRITIS OF LEFT HIP: ICD-10-CM

## 2019-12-27 PROCEDURE — 99214 OFFICE O/P EST MOD 30 MIN: CPT | Performed by: NURSE PRACTITIONER

## 2019-12-27 NOTE — PROGRESS NOTES
Assessment:  1  Chronic pain syndrome    2  Hip pain, chronic, left    3  Chronic left-sided low back pain with left-sided sciatica    4  S/P lumbar fusion    5  Lumbar spondylosis    6  Primary osteoarthritis of left hip        Plan:   While the patient was in the office today, I did have a thorough conversation with the patient regarding his chronic pain syndrome, symptoms, medication regimen, and treatment plan options  I discussed with the patient that the only other procedure option that we could try that would be possibly beneficial would be a left hip injection to try to address inflammatory component and at least provide moderate improvement in the left hip and groin pain  The patient was agreeable and verbalized an understanding  Complete risks and benefits including bleeding, infection, tissue reaction, nerve injury and allergic reaction were discussed  The approach was demonstrated using models and literature was provided  Verbal and written consent was obtained  I did have a thorough conversation with the patient and at this point because he is adamantly opposed any kind of surgical options or even spinal cord stimulation, I did explain to him that the only other thing that we would have to offer would be to look at neuropathic medications and although he is already previously tried and failed gabapentin, we could try different neuropathic medications such as Cymbalta  However, at this point the patient want to hold off on medication options and see how he does from the hip injection and proceed from there  I discussed with the patient that at this point time he can followup with our office on an as-needed basis  I did review the patient that if his pain symptoms should change, worsen, and/or if he would experience any new symptoms he would like to be evaluated for, he should give our office a call  The patient was agreeable and verbalized an understanding               History of Present Illness: The patient is a 52 y o  male last seen on 12/17/19 who presents for a follow up office visit in regards to   Chronic pain syndrome secondary to  Lumbar post laminectomy and fusion syndrome with primary osteoarthritis of the left hip  The patient currently reports  That overall the previous left sacroiliac joint injection and the most recent bilateral L2 through L4 diagnostic medial branch block were  Negative for providing any kind of pain relief even for diagnostic purposes  The patient continues with the left-sided greater than right low back and left leg, hip and groin pain  The patient presents today to discuss his treatment plan options as in the past he has tried opioid medications and even gabapentin without relief  The patient reports that in general he is not overly interested in medication options as he does not like to take medications if he does not have to  However, the patient is unsure what he wants to do as surgery is not an option in his mind and he is adamantly opposed to spinal cord stimulation  However, he feels that his current pain symptoms and state significantly limit his overall activities of daily living and quality of life  I have personally reviewed and/or updated the patient's past medical history, past surgical history, family history, social history, current medications, allergies, and vital signs today  Review of Systems:    Review of Systems   Respiratory: Negative for shortness of breath  Cardiovascular: Negative for chest pain  Gastrointestinal: Negative for constipation, diarrhea, nausea and vomiting  Musculoskeletal: Positive for gait problem  Negative for arthralgias, joint swelling (joint stiffness) and myalgias  Skin: Negative for rash  Neurological: Positive for weakness  Negative for dizziness and seizures  All other systems reviewed and are negative          Past Medical History:   Diagnosis Date    Chronic pain     Hypertension Past Surgical History:   Procedure Laterality Date    BACK SURGERY         Family History   Problem Relation Age of Onset    Hypertension Father     Cancer Paternal Grandfather        Social History     Occupational History    Not on file   Tobacco Use    Smoking status: Current Every Day Smoker     Packs/day: 2 00     Types: Cigarettes    Smokeless tobacco: Never Used   Substance and Sexual Activity    Alcohol use: Yes     Frequency: 2-4 times a month     Drinks per session: 3 or 4     Binge frequency: Less than monthly     Comment: social    Drug use: Never    Sexual activity: Never       No current outpatient medications on file  No Known Allergies    Physical Exam:    /78 (BP Location: Left arm, Patient Position: Sitting, Cuff Size: Large)   Pulse 78   Ht 5' 9" (1 753 m)   Wt 112 kg (246 lb)   BMI 36 33 kg/m²     Constitutional:overweight  Eyes:anicteric  HEENT:grossly intact  Neck:supple, symmetric, trachea midline and no masses   Pulmonary:even and unlabored  Cardiovascular:No edema or pitting edema present  Skin:Normal without rashes or lesions and well hydrated  Psychiatric:Mood and affect appropriate  Neurologic:Cranial Nerves II-XII grossly intact  Musculoskeletal:  The patient's gait is slightly antalgic, but steady without the use of any assistive devices  Pain was noted upon palpation of the left greater trochanter bursa and groin with pain greater with abduction verses adduction         Imaging  FL spine and pain procedure    (Results Pending)         Orders Placed This Encounter   Procedures    [place] spine and pain procedure

## 2019-12-27 NOTE — PATIENT INSTRUCTIONS
Sacroiliac Joint Injection   WHAT YOU NEED TO KNOW:   A sacroiliac (SI) joint injection is done to diagnose or treat pain from sacroiliac joint syndrome  The pain caused by this syndrome may be felt in your lower back, buttocks, groin, and your thigh  DISCHARGE INSTRUCTIONS:   Seek care immediately if:   · You have a fever  · You have increased redness, or swelling around the injection site  · You have drainage from the injection site  · You are not able to walk or move your leg  Contact your healthcare provider if:   · Your pain does not get better within 5 days  · You have new symptoms  · You have questions or concerns about your condition or care  Self-care:   · Drink liquids as directed  Liquids will help flush the contrast material out of your body  Ask how much liquid to drink and which liquids are best for you  · Apply ice to your injection site  Ice helps decrease swelling and pain  Use an ice pack, or put crushed ice in a plastic bag  Cover it with a towel and place it on your low back for 15 to 20 minutes every hour or as directed  · Do not take a bath or get into a hot tub after your procedure  Take a shower instead  Soaking your puncture site in a bath or hot tub increases your risk for infection  · Limit physical activity that causes pain  Rest as needed  Ask your healthcare provider how long you should limit activity  · Keep a pain diary  Write down when your pain happens, how severe it is, and any other symptoms you have with your pain  A diary will help you keep track of your pain  It may also help your healthcare provider find out what is causing your pain  Follow up with your healthcare provider as directed: You may need more injections or other treatments  Bring your pain diary to your visits  Write down your questions so you remember to ask them during your visits    © 2017 [employer] Information is for End User's use only and may not be sold, redistributed or otherwise used for commercial purposes  All illustrations and images included in CareNotes® are the copyrighted property of A D A M , Inc  or Ramy Grier  The above information is an  only  It is not intended as medical advice for individual conditions or treatments  Talk to your doctor, nurse or pharmacist before following any medical regimen to see if it is safe and effective for you  Duloxetine (By mouth)   Duloxetine (doo-LOX-[id number]-teen)  Treats depression, anxiety, diabetic peripheral neuropathy, fibromyalgia, and chronic muscle or bone pain  This medicine is an SSNRI  Brand Name(s): Cymbalta, DermacinRx DPN Goran, Irenka   There may be other brand names for this medicine  When This Medicine Should Not Be Used: This medicine is not right for everyone  Do not use it if you had an allergic reaction to duloxetine  How to Use This Medicine:   Capsule, Delayed Release Capsule  · Take your medicine as directed  Your dose may need to be changed several times to find what works best for you  · Delayed-release capsule: Swallow the capsule whole  Do not crush, chew, break, or open it  · This medicine should come with a Medication Guide  Ask your pharmacist for a copy if you do not have one  · Missed dose: Take a dose as soon as you remember  If it is almost time for your next dose, wait until then and take a regular dose  Do not take extra medicine to make up for a missed dose  · Store the medicine in a closed container at room temperature, away from heat, moisture, and direct light  Drugs and Foods to Avoid:   Ask your doctor or pharmacist before using any other medicine, including over-the-counter medicines, vitamins, and herbal products  · Do not take duloxetine if you have used an MAO inhibitor (MAOI) within the past 14 days  Do not start taking an MAO inhibitor within 5 days of stopping duloxetine  · Some medicines can affect how duloxetine works   Tell your doctor if you are using any of the following:  ¨ Buspirone, cimetidine, ciprofloxacin, enoxacin, fentanyl, lithium, Guzman's wort, theophylline, tramadol, tryptophan, or warfarin  ¨ Amphetamines  ¨ Blood pressure medicine  ¨ Diuretic (water pill)  ¨ Medicine for heart rhythm problems (including flecainide, propafenone, quinidine)  ¨ Medicine to treat migraine headaches (including triptans)  ¨ NSAID pain or arthritis medicine (including aspirin, celecoxib, diclofenac, ibuprofen, naproxen)  ¨ Other medicine to treat depression or mood disorders (including amitriptyline, desipramine, fluoxetine, imipramine, nortriptyline, paroxetine)  ¨ Phenothiazine medicine (including thioridazine)  · Tell your doctor if you use anything else that makes you sleepy  Some examples are allergy medicine, narcotic pain medicine, and alcohol  · Do not drink alcohol while you are using this medicine  Warnings While Using This Medicine:   · Tell your doctor if you are pregnant or breastfeeding, or if you have kidney disease, liver disease, diabetes, digestion problems, glaucoma, heart disease, high or low blood pressure, or problems with urination  Tell your doctor if you smoke or you have a history of seizures, or drug or alcohol addiction  · This medicine may cause the following problems:   ¨ Serious liver problems  ¨ Serotonin syndrome (more likely when used with certain other medicines)  ¨ Increased risk of bleeding problems  ¨ Serious skin reactions  ¨ Low sodium levels in the blood  · This medicine can increase thoughts of suicide  Tell your doctor right away if you start to feel depressed and have thoughts about hurting yourself  · This medicine can cause changes in your blood pressure  This may make you dizzy or drowsy  Do not drive or do anything that could be dangerous until you know how this medicine affects you  Stand up slowly to avoid falls  · Do not stop using this medicine suddenly   Your doctor will need to slowly decrease your dose before you stop it completely  · Your doctor will check your progress and the effects of this medicine at regular visits  Keep all appointments  · Keep all medicine out of the reach of children  Never share your medicine with anyone  Possible Side Effects While Using This Medicine:   Call your doctor right away if you notice any of these side effects:  · Allergic reaction: Itching or hives, swelling in your face or hands, swelling or tingling in your mouth or throat, chest tightness, trouble breathing  · Anxiety, restlessness, fever, fast heartbeat, sweating, muscle spasms, diarrhea, seeing or hearing things that are not there  · Blistering, peeling, red skin rash  · Confusion, weakness, muscle twitching  · Dark urine or pale stools, nausea, vomiting, loss of appetite, stomach pain, yellow skin or eyes  · Decrease in how much or how often you urinate  · Eye pain, vision changes, seeing halos around lights  · Feeling more energetic than usual  · Lightheadedness, dizziness, or fainting  · Unusual moods or behaviors, worsening depression, thoughts about hurting yourself, trouble sleeping  · Unusual bleeding or bruising  If you notice these less serious side effects, talk with your doctor:   · Decrease in appetite or weight  · Dry mouth, constipation, mild nausea  · Unusual drowsiness, sleepiness, or tiredness  If you notice other side effects that you think are caused by this medicine, tell your doctor  Call your doctor for medical advice about side effects  You may report side effects to FDA at 9-669-FDA-0895  © 2017 [address] [employer] Information is for End User's use only and may not be sold, redistributed or otherwise used for commercial purposes  The above information is an  only  It is not intended as medical advice for individual conditions or treatments   Talk to your doctor, nurse or pharmacist before following any medical regimen to see if it is safe and effective for you

## 2020-01-06 ENCOUNTER — TELEPHONE (OUTPATIENT)
Dept: PAIN MEDICINE | Facility: CLINIC | Age: 50
End: 2020-01-06

## 2020-01-06 NOTE — TELEPHONE ENCOUNTER
Pt called and sounded really sick and stated he could not make his appt today for an injection  I did try to reach out via IM and phone but it was too early  I was able to reach Ethan Moore at the  who was going to notify everyone        Pt can be reached at [phone]

## 2020-03-02 ENCOUNTER — HOSPITAL ENCOUNTER (OUTPATIENT)
Dept: RADIOLOGY | Facility: CLINIC | Age: 50
Discharge: HOME | End: 2020-03-02
Attending: ANESTHESIOLOGY | Admitting: ANESTHESIOLOGY
Payer: OTHER GOVERNMENT

## 2020-03-02 VITALS
TEMPERATURE: 98.1 F | OXYGEN SATURATION: 98 % | SYSTOLIC BLOOD PRESSURE: 156 MMHG | RESPIRATION RATE: 20 BRPM | DIASTOLIC BLOOD PRESSURE: 91 MMHG | HEART RATE: 74 BPM

## 2020-03-02 DIAGNOSIS — M16.12 PRIMARY OSTEOARTHRITIS OF LEFT HIP: ICD-10-CM

## 2020-03-02 DIAGNOSIS — M25.552 LEFT HIP PAIN: ICD-10-CM

## 2020-03-02 PROCEDURE — 77002 NEEDLE LOCALIZATION BY XRAY: CPT | Performed by: ANESTHESIOLOGY

## 2020-03-02 PROCEDURE — 77002 NEEDLE LOCALIZATION BY XRAY: CPT

## 2020-03-02 PROCEDURE — 20610 DRAIN/INJ JOINT/BURSA W/O US: CPT | Performed by: ANESTHESIOLOGY

## 2020-03-02 RX ORDER — LIDOCAINE HYDROCHLORIDE 10 MG/ML
5 INJECTION, SOLUTION EPIDURAL; INFILTRATION; INTRACAUDAL; PERINEURAL ONCE
Status: COMPLETED | OUTPATIENT
Start: 2020-03-02 | End: 2020-03-02

## 2020-03-02 RX ORDER — METHYLPREDNISOLONE ACETATE 80 MG/ML
80 INJECTION, SUSPENSION INTRA-ARTICULAR; INTRALESIONAL; INTRAMUSCULAR; PARENTERAL; SOFT TISSUE ONCE
Status: COMPLETED | OUTPATIENT
Start: 2020-03-02 | End: 2020-03-02

## 2020-03-02 RX ADMIN — METHYLPREDNISOLONE ACETATE 80 MG: 80 INJECTION, SUSPENSION INTRA-ARTICULAR; INTRALESIONAL; INTRAMUSCULAR; SOFT TISSUE at 10:38

## 2020-03-02 RX ADMIN — LIDOCAINE HYDROCHLORIDE 3 ML: 20 INJECTION, SOLUTION EPIDURAL; INFILTRATION; INTRACAUDAL at 10:38

## 2020-03-02 RX ADMIN — IOHEXOL 1 ML: 300 INJECTION, SOLUTION INTRAVENOUS at 10:38

## 2020-03-02 RX ADMIN — LIDOCAINE HYDROCHLORIDE 3 ML: 10 INJECTION, SOLUTION EPIDURAL; INFILTRATION; INTRACAUDAL; PERINEURAL at 10:35

## 2020-03-02 NOTE — DISCHARGE INSTRUCTIONS
Steroid Joint Injection   WHAT YOU NEED TO KNOW:   A steroid joint injection is a procedure to inject steroid medicine into a joint  Steroid medicine decreases pain and inflammation  The injection may also contain an anesthetic (numbing medicine) to decrease pain  It may be done to treat conditions such as arthritis, gout, or carpal tunnel syndrome  The injections may be given in your knee, ankle, shoulder, elbow, wrist, ankle or sacroiliac joint  1  Do not apply heat to any area that is numb  If you have discomfort or soreness at the injection site, you may apply ice today, 20 minutes on and 20 minutes off  Tomorrow you may use ice or warm, moist heat  Do not apply ice or heat directly to the skin  2  You may have an increase or change in the discomfort for 36-48 hours after your treatment  Apply ice and continue with any pain medicine you have been prescribed  3  Do not do anything strenuous today  You may shower, but no tub baths or hot tubs today  You may resume your normal activities tomorrow, but do not overdo it  Resume normal activities slowly when you are feeling better  4  If you experience redness, drainage or swelling at the injection site, or if you develop a fever above 100 degrees, please call [facility] at [phone] or go to the Emergency Room  5  Continue to take all routine medicines prescribed by your primary care physician unless otherwise instructed by our staff  Most blood thinners should be started again according to your regularly scheduled dosing  If you have any questions, please give our office a call  If you have a problem specifically related to your procedure, please call our office at [phone]  Problems not related to your procedure should be directed to your primary care physician

## 2020-03-02 NOTE — H&P
History of Present Illness: The patient is a 52 y o  male who presents with complaints of left hip pain    Patient Active Problem List   Diagnosis    Diverticulitis of large intestine with perforation and abscess without bleeding    Chronic left-sided low back pain with left-sided sciatica    S/P lumbar fusion    [facility]ili[facility] ([facility] [address] )    Lumbar spondylosis    Chronic pain syndrome    Primary osteoarthritis of left hip       Past Medical History:   Diagnosis Date    Chronic pain     Hypertension        Past Surgical History:   Procedure Laterality Date    BACK SURGERY         No current outpatient medications on file  Current Facility-Administered Medications:     iohexol (OMNIPAQUE) 300 mg/mL injection 50 mL, 50 mL, Intra-articular, Once, Flex Zamudio,     lidocaine (PF) (XYLOCAINE-MPF) 1 % injection 5 mL, 5 mL, Other, Once, Flex Zamudio DO    lidocaine (PF) (XYLOCAINE-MPF) 2 % injection 5 mL, 5 mL, Intra-articular, Once, Flex Zamudio,     methylPREDNISolone acetate (DEPO-MEDROL) injection 80 mg, 80 mg, Intra-articular, Once, Flex Zamudio DO    No Known Allergies    Physical Exam:   General: Awake, Alert, Oriented x 3, Mood and affect appropriate  Respiratory: Respirations even and unlabored  Cardiovascular: Peripheral pulses intact; no edema  Musculoskeletal Exam:  Decreased range of motion left hip    ASA Score: II         Assessment:   1  Left hip pain    2   Primary osteoarthritis of left hip        Plan: LT HIP INJ

## 2020-03-09 ENCOUNTER — TELEPHONE (OUTPATIENT)
Dept: PAIN MEDICINE | Facility: CLINIC | Age: 50
End: 2020-03-09

## 2020-04-09 ENCOUNTER — TELEMEDICINE (OUTPATIENT)
Dept: PAIN MEDICINE | Facility: CLINIC | Age: 50
End: 2020-04-09

## 2020-04-09 DIAGNOSIS — M46.1 SACROILIITIS (HCC): ICD-10-CM

## 2020-04-09 DIAGNOSIS — M25.552 LEFT HIP PAIN: ICD-10-CM

## 2020-04-09 DIAGNOSIS — M54.42 CHRONIC LEFT-SIDED LOW BACK PAIN WITH LEFT-SIDED SCIATICA: ICD-10-CM

## 2020-04-09 DIAGNOSIS — M16.12 PRIMARY OSTEOARTHRITIS OF LEFT HIP: ICD-10-CM

## 2020-04-09 DIAGNOSIS — M47.816 LUMBAR SPONDYLOSIS: ICD-10-CM

## 2020-04-09 DIAGNOSIS — G89.4 CHRONIC PAIN SYNDROME: Primary | ICD-10-CM

## 2020-04-09 DIAGNOSIS — Z98.1 S/P LUMBAR FUSION: ICD-10-CM

## 2020-04-09 DIAGNOSIS — G89.29 CHRONIC LEFT-SIDED LOW BACK PAIN WITH LEFT-SIDED SCIATICA: ICD-10-CM

## 2020-04-09 PROCEDURE — G2012 BRIEF CHECK IN BY MD/QHP: HCPCS | Performed by: NURSE PRACTITIONER

## 2021-01-26 ENCOUNTER — TELEPHONE (OUTPATIENT)
Dept: UROLOGY | Facility: AMBULATORY SURGERY CENTER | Age: 51
End: 2021-01-26

## 2021-01-26 NOTE — TELEPHONE ENCOUNTER
[facility] to be [facility] for Dribbling of Urine, can pt be scheduled on 03/05/21 w/Dr Saint Nordmann in [place], if so the day is blocked and can not schedule any appt

## 2021-01-26 NOTE — TELEPHONE ENCOUNTER
Scheduled patient for 3/5 @ 1:00 will mail new patient paperwork and appointment care- when scheduling - stated needed a referral

## 2021-01-26 NOTE — TELEPHONE ENCOUNTER
----- Message from Bharath Phoenix sent at 1/25/2021  3:17 PM EST -----  Regarding: Schedule new appointment  Could this patient be scheduled for a new patient appointment  Thank you

## 2021-03-05 ENCOUNTER — OFFICE VISIT (OUTPATIENT)
Dept: UROLOGY | Facility: HOSPITAL | Age: 51
End: 2021-03-05
Payer: COMMERCIAL

## 2021-03-05 VITALS
HEART RATE: 71 BPM | SYSTOLIC BLOOD PRESSURE: 158 MMHG | WEIGHT: 255 LBS | BODY MASS INDEX: 37.66 KG/M2 | DIASTOLIC BLOOD PRESSURE: 88 MMHG

## 2021-03-05 DIAGNOSIS — R39.9 LOWER URINARY TRACT SYMPTOMS (LUTS): Primary | ICD-10-CM

## 2021-03-05 DIAGNOSIS — R39.13 SPLITTING OF URINARY STREAM: ICD-10-CM

## 2021-03-05 DIAGNOSIS — N52.8 MIXED ERECTILE DYSFUNCTION: ICD-10-CM

## 2021-03-05 LAB — POST-VOID RESIDUAL VOLUME, ML POC: 28 ML

## 2021-03-05 PROCEDURE — 51798 US URINE CAPACITY MEASURE: CPT | Performed by: UROLOGY

## 2021-03-05 PROCEDURE — 99204 OFFICE O/P NEW MOD 45 MIN: CPT | Performed by: UROLOGY

## 2021-03-05 NOTE — PATIENT INSTRUCTIONS
Consider having cystoscopy, scope examination of the bladder to find out why you have the split stream

## 2021-03-05 NOTE — PROGRESS NOTES
HISTORY:    Several months, perhaps top two year of stream being split at the start, which makes him dribble on his leg  The rest the stream seems to be okay, and also some postvoid dribbling  No burning urgency or frequency    He says he has decreased quality of erections, and get some low back pain during intercourse as he is trying to pump the erection         ASSESSMENT / PLAN:    Emptying well with minimal PVR, urine clear  I discussed cystoscopy as the test to evaluate for possible urethral stricture or other etiology of split stream   He would like to delay that for now    I also discussed ED, if he thinks the firmness of erection is an issue, we could try Viagra  However, he would like to avoid meds and also will defer  He knows to call if he wants further treatment for above issues  The following portions of the patient's history were reviewed and updated as appropriate: allergies, current medications, past family history, past medical history, past social history, past surgical history and problem list     Review of Systems   All other systems reviewed and are negative  Objective:     Physical Exam  Constitutional:       General: He is not in acute distress  Appearance: He is well-developed  He is not diaphoretic  HENT:      Head: Normocephalic and atraumatic  Eyes:      General: No scleral icterus  Pulmonary:      Effort: Pulmonary effort is normal    Genitourinary:     Comments: Penis testes normal    Prostate minimally enlarged no nodules  Skin:     Coloration: Skin is not pale  Neurological:      Mental Status: He is alert and oriented to person, place, and time  Psychiatric:         Behavior: Behavior normal          Thought Content:  Thought content normal          Judgment: Judgment normal            No results found for: PSA]  BUN   Date Value Ref Range Status   06/11/2019 7 5 - 25 mg/dL Final   12/11/2014 16 5 - 25 mg/dL Final     Comment:     The above 1 analytes were performed by [id number]       Creatinine   Date Value Ref Range Status   06/11/2019 1 20 0 60 - 1 30 mg/dL Final     Comment:     Standardized to IDMS reference method   12/11/2014 1 03 0 60 - 1 30 mg/dL Final     Comment:     Standardized to IDMS reference method     No components found for: CBC      Patient Active Problem List   Diagnosis    Diverticulitis of large intestine with perforation and abscess without bleeding    Chronic left-sided low back pain with left-sided sciatica    S/P lumbar fusion    Sacroiliitis ([facility])    Lumbar spondylosis    Chronic pain syndrome    Primary osteoarthritis of left hip    Left hip pain    Mixed erectile dysfunction    Splitting of urinary stream        Diagnoses and all orders for this visit:    Lower urinary tract symptoms (LUTS)  -     POCT Measure PVR    Splitting of urinary stream    Mixed erectile dysfunction           Patient ID: Sarah Diop is a 48 y o  male  No current outpatient medications on file      Past Medical History:   Diagnosis Date    Chronic pain     Hypertension        Past Surgical History:   Procedure Laterality Date    BACK SURGERY         Social History

## 2021-03-08 ENCOUNTER — TELEPHONE (OUTPATIENT)
Dept: UROLOGY | Facility: MEDICAL CENTER | Age: 51
End: 2021-03-08

## 2021-03-08 NOTE — TELEPHONE ENCOUNTER
Spoke with pt regarding his new pt appt on 3/5/21  Pt is doing well and has no questions  Dr [place] suggested pt to have a cystoscopy  Pt prefers not to do that at this time  Pt will call if he has any problems or would like to have cystoscopy done

## 2022-08-11 ENCOUNTER — TELEPHONE (OUTPATIENT)
Dept: SURGERY | Facility: HOSPITAL | Age: 52
End: 2022-08-11

## 2022-08-11 NOTE — TELEPHONE ENCOUNTER
Spoke to patient  Does not want to schedule colonoscopy appt at this moment  Has new insurance and wants to get things cleared up and check coverage to make sure insurance will pay  Will call shortly to schedule appointment

## 2023-05-08 ENCOUNTER — APPOINTMENT (EMERGENCY)
Dept: CT IMAGING | Facility: HOSPITAL | Age: 53
End: 2023-05-08

## 2023-05-08 ENCOUNTER — HOSPITAL ENCOUNTER (EMERGENCY)
Facility: HOSPITAL | Age: 53
Discharge: HOME | End: 2023-05-08
Attending: EMERGENCY MEDICINE

## 2023-05-08 VITALS
SYSTOLIC BLOOD PRESSURE: 181 MMHG | TEMPERATURE: 98.9 F | HEIGHT: 69 IN | OXYGEN SATURATION: 97 % | BODY MASS INDEX: 37.03 KG/M2 | DIASTOLIC BLOOD PRESSURE: 101 MMHG | HEART RATE: 78 BPM | RESPIRATION RATE: 18 BRPM | WEIGHT: 250 LBS

## 2023-05-08 DIAGNOSIS — K76.0 HEPATIC STEATOSIS: ICD-10-CM

## 2023-05-08 DIAGNOSIS — K57.32 SIGMOID DIVERTICULITIS: Primary | ICD-10-CM

## 2023-05-08 LAB
ALBUMIN SERPL BCP-MCNC: 4.5 G/DL (ref 3.5–5)
ALP SERPL-CCNC: 77 U/L (ref 34–104)
ALT SERPL W P-5'-P-CCNC: 26 U/L (ref 7–52)
ANION GAP SERPL CALCULATED.3IONS-SCNC: 7 MMOL/L (ref 4–13)
AST SERPL W P-5'-P-CCNC: 18 U/L (ref 13–39)
BASOPHILS # BLD AUTO: 0.07 THOUSANDS/ÂΜL (ref 0–0.1)
BASOPHILS NFR BLD AUTO: 1 % (ref 0–1)
BILIRUB SERPL-MCNC: 0.54 MG/DL (ref 0.2–1)
BUN SERPL-MCNC: 14 MG/DL (ref 5–25)
CALCIUM SERPL-MCNC: 9.5 MG/DL (ref 8.4–10.2)
CHLORIDE SERPL-SCNC: 103 MMOL/L (ref 96–108)
CO2 SERPL-SCNC: 26 MMOL/L (ref 21–32)
CREAT SERPL-MCNC: 1.06 MG/DL (ref 0.6–1.3)
EOSINOPHIL # BLD AUTO: 0.17 THOUSAND/ÂΜL (ref 0–0.61)
EOSINOPHIL NFR BLD AUTO: 1 % (ref 0–6)
ERYTHROCYTE [DISTWIDTH] IN BLOOD BY AUTOMATED COUNT: 13.4 % (ref 11.6–15.1)
GFR SERPL CREATININE-BSD FRML MDRD: 80 ML/MIN/1.73SQ M
GLUCOSE SERPL-MCNC: 113 MG/DL (ref 65–140)
HCT VFR BLD AUTO: 51.2 % (ref 36.5–49.3)
HGB BLD-MCNC: 17 G/DL (ref 12–17)
IMM GRANULOCYTES # BLD AUTO: 0.05 THOUSAND/UL (ref 0–0.2)
IMM GRANULOCYTES NFR BLD AUTO: 0 % (ref 0–2)
LIPASE SERPL-CCNC: 16 U/L (ref 11–82)
LYMPHOCYTES # BLD AUTO: 3.15 THOUSANDS/ÂΜL (ref 0.6–4.47)
LYMPHOCYTES NFR BLD AUTO: 24 % (ref 14–44)
MCH RBC QN AUTO: 28.7 PG (ref 26.8–34.3)
MCHC RBC AUTO-ENTMCNC: 33.2 G/DL (ref 31.4–37.4)
MCV RBC AUTO: 86 FL (ref 82–98)
MONOCYTES # BLD AUTO: 1.29 THOUSAND/ÂΜL (ref 0.17–1.22)
MONOCYTES NFR BLD AUTO: 10 % (ref 4–12)
NEUTROPHILS # BLD AUTO: 8.31 THOUSANDS/ÂΜL (ref 1.85–7.62)
NEUTS SEG NFR BLD AUTO: 64 % (ref 43–75)
NRBC BLD AUTO-RTO: 0 /100 WBCS
PLATELET # BLD AUTO: 156 THOUSANDS/UL (ref 149–390)
PMV BLD AUTO: 11.2 FL (ref 8.9–12.7)
POTASSIUM SERPL-SCNC: 4.1 MMOL/L (ref 3.5–5.3)
PROT SERPL-MCNC: 6.9 G/DL (ref 6.4–8.4)
RBC # BLD AUTO: 5.93 MILLION/UL (ref 3.88–5.62)
SODIUM SERPL-SCNC: 136 MMOL/L (ref 135–147)
WBC # BLD AUTO: 13.04 THOUSAND/UL (ref 4.31–10.16)

## 2023-05-08 RX ORDER — AMOXICILLIN AND CLAVULANATE POTASSIUM 875; 125 MG/1; MG/1
1 TABLET, FILM COATED ORAL 3 TIMES DAILY
Qty: 30 TABLET | Refills: 0 | Status: SHIPPED | OUTPATIENT
Start: 2023-05-08 | End: 2023-05-18

## 2023-05-08 RX ORDER — AMOXICILLIN AND CLAVULANATE POTASSIUM 875; 125 MG/1; MG/1
1 TABLET, FILM COATED ORAL ONCE
Status: COMPLETED | OUTPATIENT
Start: 2023-05-08 | End: 2023-05-08

## 2023-05-08 RX ORDER — KETOROLAC TROMETHAMINE 30 MG/ML
30 INJECTION, SOLUTION INTRAMUSCULAR; INTRAVENOUS ONCE
Status: COMPLETED | OUTPATIENT
Start: 2023-05-08 | End: 2023-05-08

## 2023-05-08 RX ORDER — ONDANSETRON 4 MG/1
4 TABLET, ORALLY DISINTEGRATING ORAL EVERY 6 HOURS PRN
Qty: 20 TABLET | Refills: 0 | Status: SHIPPED | OUTPATIENT
Start: 2023-05-08

## 2023-05-08 RX ADMIN — AMOXICILLIN AND CLAVULANATE POTASSIUM 1 TABLET: 875; 125 TABLET, FILM COATED ORAL at 23:04

## 2023-05-08 RX ADMIN — KETOROLAC TROMETHAMINE 30 MG: 30 INJECTION, SOLUTION INTRAMUSCULAR; INTRAVENOUS at 21:54

## 2023-05-08 RX ADMIN — IOHEXOL 100 ML: 350 INJECTION, SOLUTION INTRAVENOUS at 21:48

## 2023-05-09 NOTE — ED PROVIDER NOTES
History  Chief Complaint   Patient presents with   • Abdominal Pain     C/O lower abdominal pain since yesterday  Denies N/V/D  States has loose stool  Hx diverticulitis with large bowel obstruction in the past and symptoms feel similar  This is a 55-year-old male with past medical history of hypertension, diverticulitis with abscess and microperforation who presents today with lower abdominal pain that started yesterday  Reports that the pain is reminiscent of previous diverticulitis  Reports that he feels as if he has some constipation, has been having small bowel fluids for the past few days  Denies any fever, chills, diarrhea, nausea or vomiting  No urinary symptoms  None       Past Medical History:   Diagnosis Date   • Chronic pain    • Hypertension        Past Surgical History:   Procedure Laterality Date   • BACK SURGERY         Family History   Problem Relation Age of Onset   • Hypertension Father    • Cancer Paternal Grandfather      I have reviewed and agree with the history as documented  E-Cigarette/Vaping     E-Cigarette/Vaping Substances     Social History     Tobacco Use   • Smoking status: Every Day     Packs/day: 2 00     Types: Cigarettes   • Smokeless tobacco: Never   Substance Use Topics   • Alcohol use: Yes     Comment: social   • Drug use: Never       Review of Systems   Constitutional: Negative for chills and fever  Respiratory: Negative for shortness of breath  Cardiovascular: Negative for chest pain  Gastrointestinal: Positive for abdominal pain and constipation  Negative for blood in stool, diarrhea, nausea and vomiting  All other systems reviewed and are negative  Physical Exam  Physical Exam  Vitals and nursing note reviewed  Constitutional:       General: He is not in acute distress  Appearance: He is well-developed  He is obese  HENT:      Head: Normocephalic and atraumatic     Eyes:      Conjunctiva/sclera: Conjunctivae normal  Cardiovascular:      Rate and Rhythm: Normal rate and regular rhythm  Heart sounds: No murmur heard  Pulmonary:      Effort: Pulmonary effort is normal    Abdominal:      General: Abdomen is protuberant  Palpations: Abdomen is soft  Tenderness: There is abdominal tenderness in the right lower quadrant, periumbilical area and left lower quadrant  There is guarding  Musculoskeletal:         General: No swelling  Cervical back: Neck supple  Skin:     General: Skin is warm and dry  Capillary Refill: Capillary refill takes less than 2 seconds  Neurological:      Mental Status: He is alert     Psychiatric:         Mood and Affect: Mood normal          Vital Signs  ED Triage Vitals [05/08/23 2033]   Temperature Pulse Respirations Blood Pressure SpO2   98 9 °F (37 2 °C) 78 18 (!) 181/101 97 %      Temp Source Heart Rate Source Patient Position - Orthostatic VS BP Location FiO2 (%)   Oral Monitor Sitting Right arm --      Pain Score       6           Vitals:    05/08/23 2033   BP: (!) 181/101   Pulse: 78   Patient Position - Orthostatic VS: Sitting         Visual Acuity      ED Medications  Medications   ketorolac (TORADOL) injection 30 mg (30 mg Intravenous Given 5/8/23 2154)   iohexol (OMNIPAQUE) 350 MG/ML injection (MULTI-DOSE) 100 mL (100 mL Intravenous Given 5/8/23 2148)   amoxicillin-clavulanate (AUGMENTIN) 875-125 mg per tablet 1 tablet (1 tablet Oral Given 5/8/23 2304)       Diagnostic Studies  Results Reviewed     Procedure Component Value Units Date/Time    Comprehensive metabolic panel [[id number]] Collected: 05/08/23 2038    Lab Status: Final result Specimen: Blood from Line, Venous Updated: 05/08/23 2102     Sodium 136 mmol/L      Potassium 4 1 mmol/L      Chloride 103 mmol/L      CO2 26 mmol/L      ANION GAP 7 mmol/L      BUN 14 mg/dL      Creatinine 1 06 mg/dL      Glucose 113 mg/dL      Calcium 9 5 mg/dL      AST 18 U/L      ALT 26 U/L      Alkaline Phosphatase 77 U/L Total Protein 6 9 g/dL      Albumin 4 5 g/dL      Total Bilirubin 0 54 mg/dL      eGFR 80 ml/min/1 73sq m     Narrative:      National Kidney Disease Foundation guidelines for Chronic Kidney Disease (CKD):   •  Stage 1 with normal or high GFR (GFR > 90 mL/min/1 73 square meters)  •  Stage 2 Mild CKD (GFR = 60-89 mL/min/1 73 square meters)  •  Stage 3A Moderate CKD (GFR = 45-59 mL/min/1 73 square meters)  •  Stage 3B Moderate CKD (GFR = 30-44 mL/min/1 73 square meters)  •  Stage 4 Severe CKD (GFR = 15-29 mL/min/1 73 square meters)  •  Stage 5 End Stage CKD (GFR <15 mL/min/1 73 square meters)  Note: GFR calculation is accurate only with a steady state creatinine    Lipase [[id number]]  (Normal) Collected: 05/08/23 2038    Lab Status: Final result Specimen: Blood from Line, Venous Updated: 05/08/23 2102     Lipase 16 u/L     CBC and differential [[id number]]  (Abnormal) Collected: 05/08/23 2038    Lab Status: Final result Specimen: Blood from Line, Venous Updated: 05/08/23 2045     WBC 13 04 Thousand/uL      RBC 5 93 Million/uL      Hemoglobin 17 0 g/dL      Hematocrit 51 2 %      MCV 86 fL      MCH 28 7 pg      MCHC 33 2 g/dL      RDW 13 4 %      MPV 11 2 fL      Platelets 754 Thousands/uL      nRBC 0 /100 WBCs      Neutrophils Relative 64 %      Immat GRANS % 0 %      Lymphocytes Relative 24 %      Monocytes Relative 10 %      Eosinophils Relative 1 %      Basophils Relative 1 %      Neutrophils Absolute 8 31 Thousands/µL      Immature Grans Absolute 0 05 Thousand/uL      Lymphocytes Absolute 3 15 Thousands/µL      Monocytes Absolute 1 29 Thousand/µL      Eosinophils Absolute 0 17 Thousand/µL      Basophils Absolute 0 07 Thousands/µL     UA (URINE) with reflex to Scope [[id number]]     Lab Status: No result Specimen: Urine                  CT abdomen pelvis w contrast   Final Result by Josie Rogers MD (05/08 2230)      Acute sigmoid diverticulitis  No evidence of perforation or abscess  Hepatic steatosis  The study was marked in [facility] for immediate notification  Workstation performed: [id number]                    Procedures  Procedures         ED Course  ED Course as of 05/09/23 0037   Mon May 08, 2023   2242 CT abdomen pelvis w contrast  Acute sigmoid diverticulitis  No evidence of perforation or abscess      Hepatic steatosis      The study was marked in EPIC for immediate notification  SBIRT 20yo+    Flowsheet Row Most Recent Value   Initial Alcohol Screen: US AUDIT-C     1  How often do you have a drink containing alcohol? 0 Filed at: 05/08/2023 2035   2  How many drinks containing alcohol do you have on a typical day you are drinking? 0 Filed at: 05/08/2023 2035   3a  Male UNDER 65: How often do you have five or more drinks on one occasion? 1 Filed at: 05/08/2023 2035   3b  FEMALE Any Age, or MALE 65+: How often do you have 4 or more drinks on one occassion? 0 Filed at: 05/08/2023 2035   Audit-C Score 1 Filed at: 05/08/2023 2035   KATHRIN: How many times in the past year have you    Used an illegal drug or used a prescription medication for non-medical reasons? Never Filed at: 05/08/2023 2035                    Medical Decision Making  This is a 70-year-old male with past medical history of hypertension, diverticulitis with abscess and microperforation who presents today with lower abdominal pain that started yesterday  On physical exam the patient is generally well-appearing, nontachycardic and afebrile  Has abdominal tenderness with guarding to his lower abdomen  Concern for acute intra-abdominal pathology  CMP and lipase stable  CBC showed elevated WBC  CT scan showed acute sigmoid diverticulitis, no evidence of perforation or abscess  Also showed hepatic steatosis  Discussed results with the patient  Pain is controlled here  Will give first dose of Augmentin given time of night and no pharmacies being open    Will send prescription for Augmentin and "Zofran as needed  Should follow-up with GI  I have discussed the plan to discharge pt from ED  The patient was discharged in stable condition   Patient ambulated off the department   Extensive return to emergency department precautions were discussed   Follow up with appropriate providers including primary care physician was discussed   Patient and/or their  primary decision maker expressed understanding  Catie Fierro remained stable during entire emergency department stay  Portions of the record may have been created with voice recognition software  Occasional wrong word or \"sound a like\" substitutions may have occurred due to the inherent limitations of voice recognition software  Read the chart carefully and recognize, using context, where substitutions have occurred  Hepatic steatosis: undiagnosed new problem with uncertain prognosis  Sigmoid diverticulitis: acute illness or injury  Amount and/or Complexity of Data Reviewed  Labs: ordered  Radiology: ordered  Decision-making details documented in ED Course  Risk  Prescription drug management  Disposition  Final diagnoses:   Sigmoid diverticulitis   Hepatic steatosis     Time reflects when diagnosis was documented in both MDM as applicable and the Disposition within this note     Time User Action Codes Description Comment    5/8/2023 10:44 PM Faribault Hindu Add [K57 32] Sigmoid diverticulitis     5/8/2023 10:44 PM Nito Interiano Add [K76 0] Hepatic steatosis       ED Disposition     ED Disposition   Discharge    Condition   Stable    Date/Time   Mon May 8, 2023 10:44 PM    Comment   Priya Augustin discharge to home/self care                 Follow-up Information     Follow up With Specialties Details Why Contact Info Additional Information    [facility], [facility]   [address]  [address] [place] [phone] [id number] [facility], " [address], [address]     [phone]    [address] [facility]   [address]  [facility]  [phone] [facility] [address]     [phone]          Discharge Medication List as of 5/8/2023 10:54 PM      START taking these medications    Details   amoxicillin-clavulanate (AUGMENTIN) 875-125 mg per tablet Take 1 tablet by mouth 3 (three) times a day for 10 days, Starting Mon 5/8/2023, Until u 5/18/2023, Normal      ondansetron (ZOFRAN-ODT) 4 mg disintegrating tablet Take 1 tablet (4 mg total) by mouth every 6 (six) hours as needed for nausea or vomiting, Starting Mon 5/8/2023, Normal             No discharge procedures on file      PDMP Review     None          ED Provider  Electronically Signed by           Christine Bolanos PA-C  05/09/23 0603

## 2023-05-09 NOTE — DISCHARGE INSTRUCTIONS
CT scan showed acute sigmoid diverticulitis  No evidence of perforation or abscess   Hepatic steatosis  Take antibiotic as directed  Follow up with GI

## 2023-10-24 ENCOUNTER — TELEPHONE (OUTPATIENT)
Dept: OBGYN CLINIC | Facility: HOSPITAL | Age: 53
End: 2023-10-24

## 2023-10-24 NOTE — TELEPHONE ENCOUNTER
Spoke to the patient about the Sandra Castañeda. No Deyanne Cloud has been started. He will be using his [facility] for his Dr. Dallas Rising appointment. He will be contacting his PCP to initiate the [address] [place] [address] Drive that can take several weeks.

## 2023-10-24 NOTE — TELEPHONE ENCOUNTER
Hello,  Please advise if the following patient can be forced onto the schedule:    Patient: Di Lu     : 1970    MRN: [id number]    Call back #: [phone]    Insurance: Alcira Meza     Reason for appointment: severe back pain for several days / hx of 2 level spinal fusions in   at [facility] / feels like something has changed /  asking for soonest appointment today or tomorrow with Dr. Chidi Mccartney        Requested doctor/location: [phone]      Thank you.

## 2023-10-25 ENCOUNTER — HOSPITAL ENCOUNTER (OUTPATIENT)
Dept: RADIOLOGY | Facility: HOSPITAL | Age: 53
Discharge: HOME | End: 2023-10-25
Attending: ORTHOPAEDIC SURGERY
Payer: COMMERCIAL

## 2023-10-25 ENCOUNTER — OFFICE VISIT (OUTPATIENT)
Dept: OBGYN CLINIC | Facility: HOSPITAL | Age: 53
End: 2023-10-25
Payer: COMMERCIAL

## 2023-10-25 VITALS
HEART RATE: 80 BPM | BODY MASS INDEX: 36.9 KG/M2 | DIASTOLIC BLOOD PRESSURE: 98 MMHG | WEIGHT: 249.12 LBS | HEIGHT: 69 IN | SYSTOLIC BLOOD PRESSURE: 160 MMHG

## 2023-10-25 DIAGNOSIS — M54.16 LUMBAR RADICULOPATHY: ICD-10-CM

## 2023-10-25 DIAGNOSIS — M47.816 LUMBAR SPONDYLOSIS: ICD-10-CM

## 2023-10-25 DIAGNOSIS — R52 PAIN: ICD-10-CM

## 2023-10-25 DIAGNOSIS — R52 PAIN: Primary | ICD-10-CM

## 2023-10-25 PROCEDURE — 72110 X-RAY EXAM L-2 SPINE 4/>VWS: CPT

## 2023-10-25 PROCEDURE — 99204 OFFICE O/P NEW MOD 45 MIN: CPT | Performed by: ORTHOPAEDIC SURGERY

## 2023-10-25 RX ORDER — IBUPROFEN 200 MG
TABLET ORAL EVERY 6 HOURS PRN
COMMUNITY

## 2023-10-25 RX ORDER — METHYLPREDNISOLONE 4 MG/1
TABLET ORAL
Qty: 21 TABLET | Refills: 0 | Status: SHIPPED | OUTPATIENT
Start: 2023-10-25

## 2023-10-25 NOTE — PROGRESS NOTES
Assessment & Plan/Medical Decision Makin y.o. male with Back Pain and Left Gluteal Pain and imaging findings most notable for lumbar spondylosis , previous lumbar fusion L4-S1        The clinical, physical and imaging findings were reviewed with the patient. Don Pierre  has a constellation of findings consistent with Lumbar Radiculopathy in the setting of lumbar degenerative disease. Worsening back and left sided pain since Saturday. Fortunately patient remains neurologically intact, however functioning has decreased due to acute flare of pain and symptoms. Physical exam limited by pain, mild left lower extremity weakness. We discussed the treatment options including physical therapy, at home exercises, activity modifications, chiropractic medicine, oral medications, interventional spine procedures. At this time recommend continued conservative treatments. MRI lumbar spine to further evaluate patient's symptoms. Will review MRI in detail with patient at follow-up visit and discuss further treatment options at that time. Medrol dose ruperto rx sent to patient's pharmacy. Discussed reasoning for prescribing medication, proper usage, and potential side effects. Did discuss referral to pain management for interventional spine procedure. However will wait to obtain lumbar MRI prior to referral.  Patient instructed to return to office/ER sooner if symptoms are not improving, getting worse, or new worrisome/neurologic symptoms arise. Patient will follow up after lumbar MRI for further treatment guidance. Subjective:      Chief Complaint: Back Pain    HPI:  Fiorella Hilario is a 48 y.o. male presenting for initial visit with chief complaint of back pain. PMH L4-S1 fusion in  with Dr. German Huizar. Improvement in radiation symptoms after surgery, worsening back pain since surgery. Has had intermittent flares of back pain since surgery treated conservatively.  Currently describes left > right back pain flare with muscle spasms since Saturday without inciting injury or event. Pain is primarily in mid back region with occasional radiation into left > right gluteal region. Denies constant radiation of pain into lower extremity or numbness/tingling. Does get intermittent shooting pains down legs when lying down. Back pain > leg pain. Subjective lower extremity weakness. Pain worse with movement, prolonged standing and walking. Difficulty sleeping due to pain. Increased pain when bending forward to tie shoes, however he has difficulty standing up straight due to the pain. Denies any chirag trauma. Denies fever or chills, no night sweats. Denies any bladder or bowel changes. Denies heart or lung disease. Denies diabetes or kidney disease. Conservative therapy includes the following:   Medications: denies     Injections:   12/3/2019 - left SI joint injection  12/17/2023 - bilateral  L2-4 MBB  3/2/2020 - left hip pain - moderate relief of pain  Physical Therapy: denies  Chiropractic Medicine: has not attempted  Accupunture/Massage Therapy: has not attempted   These therapeutic modalities were ineffective at providing sustained pain relief/functional improvement. Nicotine dependent: smokes about 1 ppd  Occupation: disability  Living situation: Lives with family   ADLs: patient is able to perform     Objective:     Family History   Problem Relation Age of Onset    Hypertension Father     Cancer Paternal Grandfather        Past Medical History:   Diagnosis Date    Chronic pain     Hypertension        Current Outpatient Medications   Medication Sig Dispense Refill    ibuprofen (MOTRIN) 200 mg tablet Take by mouth every 6 (six) hours as needed for mild pain      methylPREDNISolone 4 MG tablet therapy pack Use as directed on package. Do not take other anti-inflammatory (NSAID) medications while on Medrol dose ruperto. You should not suddenly stop using these medications.  Follow the instructions listed on the prescription about tapering your dose. 21 tablet 0    ondansetron (ZOFRAN-ODT) 4 mg disintegrating tablet Take 1 tablet (4 mg total) by mouth every 6 (six) hours as needed for nausea or vomiting (Patient not taking: Reported on 10/25/2023) 20 tablet 0     No current facility-administered medications for this visit.        Past Surgical History:   Procedure Laterality Date    BACK SURGERY         Social History     Socioeconomic History    Marital status: /Civil Union     Spouse name: Not on file    Number of children: Not on file    Years of education: Not on file    Highest education level: Not on file   Occupational History    Not on file   Tobacco Use    Smoking status: Every Day     Packs/day: 2.00     Types: Cigarettes    Smokeless tobacco: Never   Substance and Sexual Activity    Alcohol use: Yes     Comment: social    Drug use: Never    Sexual activity: Never   Other Topics Concern    Not on file   Social History Narrative    Not on file     Social Determinants of Health     Financial Resource Strain: Not on file   Food Insecurity: Not on file   Transportation Needs: Not on file   Physical Activity: Not on file   Stress: Not on file   Social Connections: Not on file   Intimate Partner Violence: Not on file   Housing Stability: Not on file       No Known Allergies    Review of Systems  General- denies fever/chills  HEENT- denies hearing loss or sore throat  Eyes- denies eye pain or visual disturbances, denies red eyes  Respiratory- denies cough or SOB  Cardio- denies chest pain or palpitations  GI- denies abdominal pain  Endocrine- denies urinary frequency  Urinary- denies pain with urination  Musculoskeletal- Negative except noted above  Skin- denies rashes or wounds  Neurological- denies dizziness or headache  Psychiatric- denies anxiety or difficulty concentrating    Physical Exam  /98   Pulse 80   Ht 5' 9" (1.753 m)   Wt 113 kg (249 lb 1.9 oz)   BMI 36.79 kg/m²     General/Constitutional: No apparent distress: well-nourished and well developed. Lymphatic: No appreciable lymphadenopathy  Respiratory: Non-labored breathing  Vascular: No edema, swelling or tenderness, except as noted in detailed exam.  Integumentary: No impressive skin lesions present, except as noted in detailed exam.  Psych: Normal mood and affect, oriented to person, place and time. MSK: normal other than stated in HPI and exam  Gait & balance: no evidence of myelopathic gait, ambulates Independently     Lumbar spine range of motion:  -Forward flexion to 90  -Extension to neutral  -Lateral bend 25 right, 25 left  -Rotation 25 right, 25 left  There tenderness with palpation along lumbar paraspinal musculature, no midline tenderness     Neurologic:    Lower Extremity Motor Function - limited by pain   Right  Left    Iliopsoas  5/5  5/5    Quadriceps 5/5 5/5   Tibialis anterior  5/5  4/5    EHL  5/5  5/5    Gastroc. muscle  5/5  5/5    Heel rise  5/5  5/5    Toe rise  5/5  5/5      Sensory: light touch is intact to bilateral upper and lower extremities     Reflexes:    Right Left   Patellar 1+ 1+   Achilles 1+ 1+   Babinski neg neg     Other tests:  Straight Leg Raise: equivocal  Nav SI: positive  VIRY SI: positive  Greater troch: no tenderness   Internal/external hip ROM: intact, no pain   Flexion/extension knee ROM: intact, no pain   Vascular: WWP extremities, 2+DP bilateral      Diagnostic Tests   IMAGING: I have personally reviewed the images and these are my findings:  Lumbar Spine X-rays from 10/25/2023: lumbar spondylosis with loss of disc height, previous lumbar fusion hardware L4-S1 without obvious signs of loosening/malfunction; osteophyte formation and facet hypertrophy, no apparent spondylolisthesis, no appreciated lytic/blastic lesions, no obvious instability    Electronic Medical Records were reviewed including imaging studies.     Procedures, if performed today     None performed       Portions of the record may have been created with voice recognition software. Occasional wrong word or "sound a like" substitutions may have occurred due to the inherent limitations of voice recognition software. Read the chart carefully and recognize, using context, where substitutions have occurred.

## 2023-10-27 ENCOUNTER — TELEPHONE (OUTPATIENT)
Age: 53
End: 2023-10-27

## 2023-10-27 NOTE — TELEPHONE ENCOUNTER
Caller: Patient    Doctor: Sabino    Reason for call: Patient calling regarding [facility] pharmacy needs a order script for medication methylPREDNISolone   Please advise     Call back#: fax # to pharmacy [phone]

## 2023-10-30 ENCOUNTER — TELEPHONE (OUTPATIENT)
Age: 53
End: 2023-10-30

## 2023-10-30 NOTE — TELEPHONE ENCOUNTER
Caller: [facility] pharmacy     Doctor:  Juana Rainey     Reason for call: states they received a paper that is not a script for the patient, asked if you wanted to send a script please send to [address] [place] [address]

## 2023-11-10 ENCOUNTER — HOSPITAL ENCOUNTER (OUTPATIENT)
Dept: MRI IMAGING | Facility: HOSPITAL | Age: 53
End: 2023-11-10
Payer: COMMERCIAL

## 2023-11-10 DIAGNOSIS — M47.816 LUMBAR SPONDYLOSIS: ICD-10-CM

## 2023-11-10 DIAGNOSIS — M54.16 LUMBAR RADICULOPATHY: ICD-10-CM

## 2023-11-10 PROCEDURE — G1004 CDSM NDSC: HCPCS

## 2023-11-10 PROCEDURE — 72148 MRI LUMBAR SPINE W/O DYE: CPT

## 2023-11-15 ENCOUNTER — OFFICE VISIT (OUTPATIENT)
Dept: OBGYN CLINIC | Facility: HOSPITAL | Age: 53
End: 2023-11-15
Payer: COMMERCIAL

## 2023-11-15 VITALS
BODY MASS INDEX: 36.9 KG/M2 | SYSTOLIC BLOOD PRESSURE: 150 MMHG | HEART RATE: 71 BPM | HEIGHT: 69 IN | DIASTOLIC BLOOD PRESSURE: 79 MMHG | WEIGHT: 249.12 LBS

## 2023-11-15 DIAGNOSIS — M51.26 LUMBAR DISC HERNIATION: ICD-10-CM

## 2023-11-15 DIAGNOSIS — M47.816 LUMBAR SPONDYLOSIS: ICD-10-CM

## 2023-11-15 DIAGNOSIS — M54.16 LUMBAR RADICULOPATHY: Primary | ICD-10-CM

## 2023-11-15 PROCEDURE — 99213 OFFICE O/P EST LOW 20 MIN: CPT | Performed by: ORTHOPAEDIC SURGERY

## 2023-11-15 NOTE — PROGRESS NOTES
Assessment & Plan/Medical Decision Makin y.o. male with Back Pain and Left Gluteal Pain and imaging findings most notable for lumbar spondylosis , previous lumbar fusion L4-S1, L3-4 disc herniation        The clinical, physical and imaging findings were reviewed with the patient. Tesha Johnson  has a constellation of findings consistent with Lumbar Radiculopathy in the setting of lumbar degenerative disease. Pain has improved slightly since initial visit, but continues with low back and intermittent lower extremity pain. Continued muscle spasms and stiffness in back. Notes he feel like he is back to his baseline. Fortunately patient remains neurologically intact, however functioning has decreased due to acute flare of pain and symptoms. Physical exam limited by pain, mild left lower extremity weakness. We discussed the treatment options including physical therapy, at home exercises, activity modifications, chiropractic medicine, oral medications, interventional spine procedures. At this time recommend continued conservative treatments. Referral to pain management for evaluation and treatment, possible lumbar BUNNY. Discussed potential role of steroid injection at or near the source of pain to provide targeted relief. Patient instructed to return to office/ER sooner if symptoms are not improving, getting worse, or new worrisome/neurologic symptoms arise. Subjective:      Chief Complaint: Back Pain    HPI:  Yakov He is a 48 y.o. male presenting for initial visit with chief complaint of back pain. PMH L4-S1 fusion in  with Dr. Tim Sweeney. Improvement in radiation symptoms after surgery, worsening back pain since surgery. Has had intermittent flares of back pain since surgery treated conservatively. Currently describes left > right back pain flare with muscle spasms since Saturday without inciting injury or event.  Pain is primarily in mid back region with occasional radiation into left > right gluteal region. Denies constant radiation of pain into lower extremity or numbness/tingling. Does get intermittent shooting pains down legs when lying down. Back pain > leg pain. Subjective lower extremity weakness. Pain worse with movement, prolonged standing and walking. Difficulty sleeping due to pain. Increased pain when bending forward to tie shoes, however he has difficulty standing up straight due to the pain. Denies any chirag trauma. Denies fever or chills, no night sweats. Denies any bladder or bowel changes. Denies heart or lung disease. Denies diabetes or kidney disease. Conservative therapy includes the following:   Medications: denies     Injections:   12/3/2019 - left SI joint injection  12/17/2019 - bilateral  L2-4 MBB  3/2/2020 - left hip pain - moderate relief of pain  Physical Therapy: denies  Chiropractic Medicine: has not attempted  Accupunture/Massage Therapy: has not attempted   These therapeutic modalities were ineffective at providing sustained pain relief/functional improvement. Nicotine dependent: smokes about 1 ppd  Occupation: disability  Living situation: Lives with family   ADLs: patient is able to perform     Update on 11/15/2023:  Rimma Lopez is here for follow up, lumbar MRI review. Pain has improved slightly since initial visit, noting excruciating pain has subsided. However continues with low back pain with intermittent radiation into bilateral gluteal region and lower extremities. Continued muscle spasms and stiffness, difficulty putting on shoes due to pain. Notes he is essentially back to his baseline. Denies chirag lower extremity weakness.     Objective:     Family History   Problem Relation Age of Onset    Hypertension Father     Cancer Paternal Grandfather        Past Medical History:   Diagnosis Date    Chronic pain     Hypertension        Current Outpatient Medications   Medication Sig Dispense Refill    ibuprofen (MOTRIN) 200 mg tablet Take by mouth every 6 (six) hours as needed for mild pain      methylPREDNISolone 4 MG tablet therapy pack Use as directed on package. Do not take other anti-inflammatory (NSAID) medications while on Medrol dose ruperto. You should not suddenly stop using these medications. Follow the instructions listed on the prescription about tapering your dose. (Patient not taking: Reported on 11/15/2023) 21 tablet 0    ondansetron (ZOFRAN-ODT) 4 mg disintegrating tablet Take 1 tablet (4 mg total) by mouth every 6 (six) hours as needed for nausea or vomiting (Patient not taking: Reported on 10/25/2023) 20 tablet 0     No current facility-administered medications for this visit.        Past Surgical History:   Procedure Laterality Date    BACK SURGERY         Social History     Socioeconomic History    Marital status: /Civil Union     Spouse name: Not on file    Number of children: Not on file    Years of education: Not on file    Highest education level: Not on file   Occupational History    Not on file   Tobacco Use    Smoking status: Every Day     Packs/day: 2.00     Types: Cigarettes    Smokeless tobacco: Never   Substance and Sexual Activity    Alcohol use: Yes     Comment: social    Drug use: Never    Sexual activity: Never   Other Topics Concern    Not on file   Social History Narrative    Not on file     Social Determinants of Health     Financial Resource Strain: Not on file   Food Insecurity: Not on file   Transportation Needs: Not on file   Physical Activity: Not on file   Stress: Not on file   Social Connections: Not on file   Intimate Partner Violence: Not on file   Housing Stability: Not on file       No Known Allergies    Review of Systems  General- denies fever/chills  HEENT- denies hearing loss or sore throat  Eyes- denies eye pain or visual disturbances, denies red eyes  Respiratory- denies cough or SOB  Cardio- denies chest pain or palpitations  GI- denies abdominal pain  Endocrine- denies urinary frequency  Urinary- denies pain with urination  Musculoskeletal- Negative except noted above  Skin- denies rashes or wounds  Neurological- denies dizziness or headache  Psychiatric- denies anxiety or difficulty concentrating    Physical Exam  /79   Pulse 71   Ht 5' 9" (1.753 m)   Wt 113 kg (249 lb 1.9 oz)   BMI 36.79 kg/m²     General/Constitutional: No apparent distress: well-nourished and well developed. Lymphatic: No appreciable lymphadenopathy  Respiratory: Non-labored breathing  Vascular: No edema, swelling or tenderness, except as noted in detailed exam.  Integumentary: No impressive skin lesions present, except as noted in detailed exam.  Psych: Normal mood and affect, oriented to person, place and time.   MSK: normal other than stated in HPI and exam  Gait & balance: no evidence of myelopathic gait, ambulates Independently     Lumbar spine range of motion:  -Forward flexion to 90  -Extension to neutral  -Lateral bend 25 right, 25 left  -Rotation 25 right, 25 left  There tenderness with palpation along lumbar paraspinal musculature, no midline tenderness     Neurologic:    Lower Extremity Motor Function - limited by pain   Right  Left    Iliopsoas  5/5  5/5    Quadriceps 5/5 5/5   Tibialis anterior  5/5  4/5    EHL  5/5  5/5    Gastroc. muscle  5/5  5/5    Heel rise  5/5  5/5    Toe rise  5/5  5/5      Sensory: light touch is intact to bilateral upper and lower extremities     Reflexes:    Right Left   Patellar 1+ 1+   Achilles 1+ 1+   Babinski neg neg     Other tests:  Straight Leg Raise: equivocal  Nav SI: positive  VIRY SI: positive  Greater troch: no tenderness   Internal/external hip ROM: intact, no pain   Flexion/extension knee ROM: intact, no pain   Vascular: WWP extremities, 2+DP bilateral      Diagnostic Tests   IMAGING: I have personally reviewed the images and these are my findings:  Lumbar Spine X-rays from 10/25/2023: lumbar spondylosis with loss of disc height, previous lumbar fusion hardware L4-S1 without obvious signs of loosening/malfunction; osteophyte formation and facet hypertrophy, no apparent spondylolisthesis, no appreciated lytic/blastic lesions, no obvious instability    Lumbar Spine MRI from 11/10/2023: multi level lumbar disc degeneration with disc desiccation, loss of disc height, previous L4-S1 fusion hardware; facet and ligamentum hypertrophy, L3-4 central disc herniation, moderate L3-4 central, lateral recess, foraminal stenosis     Electronic Medical Records were reviewed including imaging studies. Procedures, if performed today     None performed       Portions of the record may have been created with voice recognition software. Occasional wrong word or "sound a like" substitutions may have occurred due to the inherent limitations of voice recognition software. Read the chart carefully and recognize, using context, where substitutions have occurred.

## 2023-12-06 ENCOUNTER — CONSULT (OUTPATIENT)
Dept: PAIN MEDICINE | Facility: CLINIC | Age: 53
End: 2023-12-06
Payer: COMMERCIAL

## 2023-12-06 VITALS
WEIGHT: 248 LBS | HEART RATE: 79 BPM | DIASTOLIC BLOOD PRESSURE: 84 MMHG | TEMPERATURE: 97.9 F | HEIGHT: 69 IN | SYSTOLIC BLOOD PRESSURE: 150 MMHG | BODY MASS INDEX: 36.73 KG/M2

## 2023-12-06 DIAGNOSIS — M51.17 INTERVERTEBRAL DISC DISORDERS WITH RADICULOPATHY, LUMBOSACRAL REGION: Primary | ICD-10-CM

## 2023-12-06 DIAGNOSIS — Z98.1 S/P LUMBAR FUSION: ICD-10-CM

## 2023-12-06 PROCEDURE — 99204 OFFICE O/P NEW MOD 45 MIN: CPT | Performed by: ANESTHESIOLOGY

## 2023-12-06 NOTE — PROGRESS NOTES
Assessment  1. Intervertebral disc disorders with radiculopathy, lumbosacral region    2. S/P lumbar fusion        Plan    Pleasant 24-year-old gentleman referred from orthopedics for consideration of an epidural steroid injection. I think it is reasonable to 1 epidural steroid injection but if he does not obtain any relief as he has tried other injections in the past including diagnostic blocks as well as sacroiliac joint injection without relief would be worth holding. Would consider medication such as Cymbalta as he has not been on other medications such as Lyrica, gabapentin in the past without relief. In the office today did review the nature of his pathology reviewing the MRI in detail and discussed the procedure including risks and he wished to proceed. My impressions and treatment recommendations were discussed in detail with the patient who verbalized understanding and had no further questions. Discharge instructions were provided. I personally saw and examined the patient and I agree with the above discussed plan of care. This note is created using dictation transcription. It may contain typographical errors, grammatical errors, improperly dictated words, background noise and other errors. Orders Placed This Encounter   Procedures    FL spine and pain procedure     Standing Status:   Future     Standing Expiration Date:   12/6/2027     Order Specific Question:   Reason for Exam:     Answer:   LESI L3-4     Order Specific Question:   Anticoagulant hold needed? Answer:   no     No orders of the defined types were placed in this encounter. Referred By: Lashawn Kauffman PA-C  History of Present Illness    Meghann Prater is a 48 y.o. male with longstanding history of chronic pain. Has a history of anterior lumbar fusion a number of years ago.   Underwent diagnostic medial branch blocks as well as sacroiliac joint injection without long-term relief with chronic low back and lower extremity pain. His pain is severe rates as 10 out of 10 the visual analog scale has subjective weakness of the lower limbs but denies any bowel bladder dysfunction. His pain is constant throughout the day where most activities aggravate his symptoms. In the past nothing is really provided relief including physical therapy chiropractic treatment. I have personally reviewed and/or updated the patient's past medical history, past surgical history, family history, social history, current medications, allergies, and vital signs today. Review of Systems   Constitutional:  Negative for fever and unexpected weight change. HENT:  Negative for trouble swallowing. Eyes:  Negative for visual disturbance. Respiratory:  Negative for shortness of breath and wheezing. Cardiovascular:  Negative for chest pain and palpitations. Gastrointestinal:  Positive for abdominal pain. Negative for constipation, diarrhea, nausea and vomiting. Endocrine: Negative for cold intolerance, heat intolerance and polydipsia. Genitourinary:  Negative for difficulty urinating and frequency. Musculoskeletal:  Positive for arthralgias, joint swelling and myalgias. Negative for gait problem. Skin:  Negative for rash. Neurological:  Negative for dizziness, seizures, syncope, weakness and headaches. Hematological:  Does not bruise/bleed easily. Psychiatric/Behavioral:  Negative for dysphoric mood. All other systems reviewed and are negative.       Patient Active Problem List   Diagnosis    Diverticulitis of large intestine with perforation and abscess without bleeding    Chronic left-sided low back pain with left-sided sciatica    S/P lumbar fusion    Sacroiliitis (HCC)    Lumbar spondylosis    Chronic pain syndrome    Primary osteoarthritis of left hip    Left hip pain    Mixed erectile dysfunction    Splitting of urinary stream       Past Medical History:   Diagnosis Date    Arthritis     Chronic pain     Hypertension Past Surgical History:   Procedure Laterality Date    BACK SURGERY         Family History   Problem Relation Age of Onset    Hypertension Father     Cancer Paternal Grandfather        Social History     Occupational History    Not on file   Tobacco Use    Smoking status: Every Day     Packs/day: 2.00     Types: Cigarettes    Smokeless tobacco: Never   Substance and Sexual Activity    Alcohol use: Yes     Comment: social    Drug use: Never    Sexual activity: Never       Current Outpatient Medications on File Prior to Visit   Medication Sig    ibuprofen (MOTRIN) 200 mg tablet Take by mouth every 6 (six) hours as needed for mild pain    methylPREDNISolone 4 MG tablet therapy pack Use as directed on package. Do not take other anti-inflammatory (NSAID) medications while on Medrol dose ruperto. You should not suddenly stop using these medications. Follow the instructions listed on the prescription about tapering your dose. (Patient not taking: Reported on 11/15/2023)    ondansetron (ZOFRAN-ODT) 4 mg disintegrating tablet Take 1 tablet (4 mg total) by mouth every 6 (six) hours as needed for nausea or vomiting (Patient not taking: Reported on 10/25/2023)     No current facility-administered medications on file prior to visit. No Known Allergies    Physical Exam    /84 (BP Location: Left arm, Patient Position: Sitting, Cuff Size: Standard)   Pulse 79   Temp 97.9 °F (36.6 °C)   Ht 5' 9" (1.753 m)   Wt 112 kg (248 lb)   BMI 36.62 kg/m²     Constitutional: normal, well developed, well nourished, alert, in no distress and non-toxic and no overt pain behavior.  and overweight  Eyes: anicteric  HEENT: grossly intact  Neck: supple, symmetric, trachea midline and no masses   Pulmonary:even and unlabored  Cardiovascular:No edema or pitting edema present  Skin:Normal without rashes or lesions and well hydrated  Psychiatric:Mood and affect appropriate  Neurologic:Cranial Nerves II-XII grossly intact  Musculoskeletal:normal, difficulty going from sitting to standing sitting position; no obvious skin lesions or erythema lumbar sacral spine; mild tenderness in lumbar paravertebrals, no sacroiliac or greater trochanteric tenderness bilateral; deep tendon reflexes are diminished but symmetrical bilateral patellar and achilles; no focal motor deficit appreciated lower limbs; negative bilateral straight leg raising. Imaging  MRI LUMBAR SPINE WITHOUT CONTRAST @  11-10-23     INDICATION: M54.16: Radiculopathy, lumbar region  M47.816: Spondylosis without myelopathy or radiculopathy, lumbar region. COMPARISON: 10/18/2019     TECHNIQUE:  Multiplanar, multisequence imaging of the lumbar spine was performed. .        IMAGE QUALITY:  Diagnostic     FINDINGS:     VERTEBRAL BODIES:  There are 5 lumbar type vertebral bodies. Minimal levoscoliosis mid to lower lumbar spine. Normal lordosis. Anterior fusion hardware L4-S1 noted with associated susceptibility artifact limiting local evaluation, stable. SACRUM: Anterior fusion hardware and S1 redemonstrated limiting local evaluation. DISTAL CORD AND CONUS:  Normal size and signal within the distal cord and conus. The conus medullaris terminates at the L1 level. PARASPINAL SOFT TISSUES:  Paraspinal soft tissues are unremarkable. LOWER THORACIC DISC SPACES:  Normal disc height and signal.  No disc herniation, canal stenosis or foraminal narrowing. LUMBAR DISC SPACES:     L1-L2:  Normal.     L2-L3:  Normal.     L3-L4: There is loss of disc height and signal. There is a central disc protrusion. There is bilateral facet hypertrophy. Mild central canal and left neural foraminal narrowing. Mild right neural foraminal narrowing. Spondylotic narrowing is increased   from the prior study. L4-L5: Surgically capacious     L5-S1: Surgically capacious     OTHER FINDINGS:  None.      IMPRESSION:        1. New anterior disc herniation L3-4 with mild spondylotic narrowing increased from the prior study. 2. Stable anterior fusion L4-S1.     LUMBAR SPINE @  10-25-23     INDICATION:   R52: Pain, unspecified. COMPARISON: October 12, 2016. VIEWS:  XR SPINE LUMBAR MINIMUM 4 VIEWS NON INJURY        FINDINGS:     There are 5 non rib bearing lumbar vertebral bodies. There is no evidence of acute fracture or destructive osseous lesion. Alignment is unremarkable. No instability on dynamic flexion-extension. Prior anterior fusion of L4-5 and L5-S1 with disc space graft. Hardware is intact. Mild disc space narrowing with endplate sclerosis and small marginal osteophytes at L3-4. Minimal degenerative changes at other levels. The pedicles appear intact. Soft tissues are unremarkable. IMPRESSION:     No acute osseous abnormality. Stable alignment status post L4-5 and L5-S1 fusion. I have personally reviewed pertinent films in PACS and my interpretation is anterior fusion with osteophytes at L3-4.

## 2023-12-19 ENCOUNTER — HOSPITAL ENCOUNTER (OUTPATIENT)
Dept: RADIOLOGY | Facility: CLINIC | Age: 53
Discharge: HOME | End: 2023-12-19
Payer: COMMERCIAL

## 2023-12-19 VITALS
OXYGEN SATURATION: 95 % | SYSTOLIC BLOOD PRESSURE: 143 MMHG | RESPIRATION RATE: 18 BRPM | HEART RATE: 70 BPM | DIASTOLIC BLOOD PRESSURE: 87 MMHG | TEMPERATURE: 97.2 F

## 2023-12-19 DIAGNOSIS — M51.17 INTERVERTEBRAL DISC DISORDERS WITH RADICULOPATHY, LUMBOSACRAL REGION: ICD-10-CM

## 2023-12-19 PROCEDURE — 62323 NJX INTERLAMINAR LMBR/SAC: CPT | Performed by: ANESTHESIOLOGY

## 2023-12-19 RX ORDER — METHYLPREDNISOLONE ACETATE 80 MG/ML
80 INJECTION, SUSPENSION INTRA-ARTICULAR; INTRALESIONAL; INTRAMUSCULAR; PARENTERAL; SOFT TISSUE ONCE
Status: COMPLETED | OUTPATIENT
Start: 2023-12-19 | End: 2023-12-19

## 2023-12-19 RX ADMIN — METHYLPREDNISOLONE ACETATE 80 MG: 80 INJECTION, SUSPENSION INTRA-ARTICULAR; INTRALESIONAL; INTRAMUSCULAR; SOFT TISSUE at 15:01

## 2023-12-19 RX ADMIN — IOHEXOL 1 ML: 300 INJECTION, SOLUTION INTRAVENOUS at 15:01

## 2023-12-19 NOTE — DISCHARGE INSTRUCTIONS
Epidural Steroid Injection   WHAT YOU NEED TO KNOW:   An epidural steroid injection (BUNNY) is a procedure to inject steroid medicine into the epidural space. The epidural space is between your spinal cord and vertebrae. Steroids reduce inflammation and fluid buildup in your spine that may be causing pain. You may be given pain medicine along with the steroids.          ACTIVITY  Do not drive or operate machinery today.  No strenuous activity today - bending, lifting, etc.  You may resume normal activites starting tomorrow - start slowly and as tolerated.  You may shower today, but no tub baths or hot tubs.  You may have numbness for several hours from the local anesthetic. Please use caution and common sense, especially with weight-bearing activities.    CARE OF THE INJECTION SITE  If you have soreness or pain, apply ice to the area today (20 minutes on/20 minutes off).  Starting tomorrow, you may use warm, moist heat or ice if needed.  You may have an increase or change in your discomfort for 36-48 hours after your treatment.  Apply ice and continue with any pain medication you have been prescribed.  Notify the [facility] if you have any of the following: redness, drainage, swelling, headache, stiff neck or fever above 100°F.    SPECIAL INSTRUCTIONS  Our office will contact you in approximately 7 days for a progress report.    MEDICATIONS  Continue to take all routine medications.  Our office may have instructed you to hold some medications.    As no general anesthesia was used in today's procedure, you should not experience any side effects related to anesthesia.     If you are diabetic, the steroids used in today's injection may temporarily increase your blood sugar levels after the first few days after your injection. Please keep a close eye on your sugars and alert the doctor who manages your diabetes if your sugars are significantly high from your baseline or you are symptomatic.     If you have a  problem specifically related to your procedure, please call our office at [phone].  Problems not related to your procedure should be directed to your primary care physician.

## 2023-12-19 NOTE — H&P
History of Present Illness: The patient is a 53 y.o. male who presents with complaints of low back and leg pain.    Past Medical History:   Diagnosis Date    Arthritis     Chronic pain     Hypertension        Past Surgical History:   Procedure Laterality Date    BACK SURGERY           Current Outpatient Medications:     ibuprofen (MOTRIN) 200 mg tablet, Take by mouth every 6 (six) hours as needed for mild pain, Disp: , Rfl:     methylPREDNISolone 4 MG tablet therapy pack, Use as directed on package. Do not take other anti-inflammatory (NSAID) medications while on Medrol dose ruperto. You should not suddenly stop using these medications. Follow the instructions listed on the prescription about tapering your dose. (Patient not taking: Reported on 11/15/2023), Disp: 21 tablet, Rfl: 0    ondansetron (ZOFRAN-ODT) 4 mg disintegrating tablet, Take 1 tablet (4 mg total) by mouth every 6 (six) hours as needed for nausea or vomiting (Patient not taking: Reported on 10/25/2023), Disp: 20 tablet, Rfl: 0    Current Facility-Administered Medications:     iohexol (OMNIPAQUE) 300 mg/mL injection 1 mL, 1 mL, Epidural, Once, Flex Heatonev, DO    methylPREDNISolone acetate (DEPO-MEDROL) injection 80 mg, 80 mg, Epidural, Once, Flex Zamudio, DO    No Known Allergies    Physical Exam:   General: Awake, Alert, Oriented x 3, Mood and affect appropriate  Respiratory: Respirations even and unlabored  Cardiovascular: Peripheral pulses intact; no edema  Musculoskeletal Exam: decreased range of motion lumbar spine    ASA Score: II         Assessment:   1. Intervertebral disc disorders with radiculopathy, lumbosacral region        Plan: LESI L3-4

## 2023-12-26 ENCOUNTER — TELEPHONE (OUTPATIENT)
Dept: PAIN MEDICINE | Facility: CLINIC | Age: 53
End: 2023-12-26

## 2023-12-26 NOTE — TELEPHONE ENCOUNTER
Pt reports no improvement post inj   Pain level 6/10  Pt aware I will call next week for an update    LESI L3-4 12/19, 1/16 F/u

## 2024-01-16 ENCOUNTER — OFFICE VISIT (OUTPATIENT)
Dept: PAIN MEDICINE | Facility: CLINIC | Age: 54
End: 2024-01-16
Payer: COMMERCIAL

## 2024-01-16 VITALS
WEIGHT: 248 LBS | SYSTOLIC BLOOD PRESSURE: 142 MMHG | DIASTOLIC BLOOD PRESSURE: 82 MMHG | BODY MASS INDEX: 36.73 KG/M2 | HEIGHT: 69 IN | TEMPERATURE: 98.2 F | HEART RATE: 84 BPM

## 2024-01-16 DIAGNOSIS — M46.1 SACROILIITIS (HCC): Primary | ICD-10-CM

## 2024-01-16 DIAGNOSIS — Z98.1 HISTORY OF LUMBAR FUSION: ICD-10-CM

## 2024-01-16 DIAGNOSIS — M47.816 LUMBAR SPONDYLOSIS: ICD-10-CM

## 2024-01-16 PROCEDURE — 99214 OFFICE O/P EST MOD 30 MIN: CPT | Performed by: PHYSICIAN ASSISTANT

## 2024-01-16 NOTE — PROGRESS NOTES
Assessment:  1. Sacroiliitis (HCC)    2. History of lumbar fusion    3. Lumbar spondylosis        Plan:  While the patient was in the office today, I did have a thorough conversation regarding their chronic pain syndrome, medication management, and treatment plan options.    Based on patient report and physical exam, the patient's symptomatology does seem to be consistent with sacroiliac mediated pain from sacroiliitis. We will schedule the patient for a bilateral SIJ injection to decrease any inflammatory component of the patient's pain symptoms.    Patient is not interested in medication management.    The patient was advised to contact the office should their symptoms worsen in the interim. The patient was agreeable and verbalized an understanding.        History of Present Illness:    The patient is a 53 y.o. male last seen on 12/19/2023 who presents for a follow up office visit in regards to chronic low back pain secondary to postlaminectomy pain syndrome, lumbar spondylosis and lumbar degenerative disc.  The patient currently reports chronic low back pain that he presently rates a 7 out of 10 and describes it as a constant dull, aching, sharp, throbbing and shooting pain.  He has referred pain patterns into bilateral hips and buttocks.  He denies any lower extremity radicular features below the mid thigh.  Pain is made worse with prolonged standing, bending and walking.  Unfortunately the patient was unable to report any improvement following L3-4 epidural steroid injection completed on 12/19/2023.  Has a long history of back pain and L4-S1 spinal fusion.  He has tried and failed to respond to many different types of medications or had intolerable side effects so at this point he is not interested in any sort of medication management.    I have personally reviewed and/or updated the patient's past medical history, past surgical history, family history, social history, current medications, allergies, and vital  signs today.       Review of Systems:    Review of Systems   Respiratory:  Negative for shortness of breath.    Cardiovascular:  Negative for chest pain.   Gastrointestinal:  Negative for constipation, diarrhea, nausea and vomiting.   Musculoskeletal:  Positive for gait problem. Negative for arthralgias, joint swelling (Joint stiffness) and myalgias.   Skin:  Negative for rash.   Neurological:  Negative for dizziness, seizures and weakness.   All other systems reviewed and are negative.        Past Medical History:   Diagnosis Date   • Arthritis    • Chronic pain    • Hypertension        Past Surgical History:   Procedure Laterality Date   • BACK SURGERY         Family History   Problem Relation Age of Onset   • Hypertension Father    • Cancer Paternal Grandfather        Social History     Occupational History   • Not on file   Tobacco Use   • Smoking status: Every Day     Current packs/day: 2.00     Types: Cigarettes   • Smokeless tobacco: Never   Substance and Sexual Activity   • Alcohol use: Yes     Comment: social   • Drug use: Never   • Sexual activity: Never         Current Outpatient Medications:   •  ibuprofen (MOTRIN) 200 mg tablet, Take by mouth every 6 (six) hours as needed for mild pain, Disp: , Rfl:   •  methylPREDNISolone 4 MG tablet therapy pack, Use as directed on package. Do not take other anti-inflammatory (NSAID) medications while on Medrol dose ruperto. You should not suddenly stop using these medications. Follow the instructions listed on the prescription about tapering your dose. (Patient not taking: Reported on 11/15/2023), Disp: 21 tablet, Rfl: 0  •  ondansetron (ZOFRAN-ODT) 4 mg disintegrating tablet, Take 1 tablet (4 mg total) by mouth every 6 (six) hours as needed for nausea or vomiting (Patient not taking: Reported on 10/25/2023), Disp: 20 tablet, Rfl: 0    No Known Allergies    Physical Exam:    /82 (BP Location: Left arm, Patient Position: Sitting, Cuff Size: Standard)   Pulse 84    "Temp 98.2 °F (36.8 °C)   Ht 5' 9\" (1.753 m)   Wt 112 kg (248 lb)   BMI 36.62 kg/m²     Constitutional:normal, well developed, well nourished, alert, in no distress and non-toxic and no overt pain behavior. and overweight  Eyes:anicteric  HEENT:grossly intact  Neck:supple, symmetric, trachea midline and no masses   Pulmonary:even and unlabored  Cardiovascular:No edema or pitting edema present  Skin:Normal without rashes or lesions and well hydrated  Psychiatric:Mood and affect appropriate  Neurologic:Cranial Nerves II-XII grossly intact  Musculoskeletal: Tender bilateral sacroiliac joints, + Nav finger sign + Patricks test +Gaenslen's test+ Posterior pelvic pain provocation      Imaging  FL spine and pain procedure    (Results Pending)         Orders Placed This Encounter   Procedures   • FL spine and pain procedure       "

## 2024-01-22 ENCOUNTER — HOSPITAL ENCOUNTER (OUTPATIENT)
Dept: RADIOLOGY | Facility: CLINIC | Age: 54
Discharge: HOME | End: 2024-01-22
Admitting: ANESTHESIOLOGY
Payer: COMMERCIAL

## 2024-01-22 VITALS
SYSTOLIC BLOOD PRESSURE: 150 MMHG | OXYGEN SATURATION: 92 % | HEART RATE: 70 BPM | DIASTOLIC BLOOD PRESSURE: 91 MMHG | RESPIRATION RATE: 18 BRPM | TEMPERATURE: 97.4 F

## 2024-01-22 DIAGNOSIS — M46.1 SACROILIITIS (HCC): ICD-10-CM

## 2024-01-22 PROCEDURE — 27096 INJECT SACROILIAC JOINT: CPT | Performed by: ANESTHESIOLOGY

## 2024-01-22 RX ORDER — ROPIVACAINE HYDROCHLORIDE 2 MG/ML
2 INJECTION, SOLUTION EPIDURAL; INFILTRATION; PERINEURAL ONCE
Status: COMPLETED | OUTPATIENT
Start: 2024-01-22 | End: 2024-01-22

## 2024-01-22 RX ORDER — METHYLPREDNISOLONE ACETATE 80 MG/ML
80 INJECTION, SUSPENSION INTRA-ARTICULAR; INTRALESIONAL; INTRAMUSCULAR; PARENTERAL; SOFT TISSUE ONCE
Status: COMPLETED | OUTPATIENT
Start: 2024-01-22 | End: 2024-01-22

## 2024-01-22 RX ADMIN — IOHEXOL 0.4 ML: 300 INJECTION, SOLUTION INTRAVENOUS at 09:32

## 2024-01-22 RX ADMIN — ROPIVACAINE HYDROCHLORIDE 2 ML: 2 INJECTION, SOLUTION EPIDURAL; INFILTRATION at 09:32

## 2024-01-22 RX ADMIN — METHYLPREDNISOLONE ACETATE 80 MG: 80 INJECTION, SUSPENSION INTRA-ARTICULAR; INTRALESIONAL; INTRAMUSCULAR; SOFT TISSUE at 09:32

## 2024-01-22 NOTE — DISCHARGE INSTRUCTIONS

## 2024-01-22 NOTE — H&P
History of Present Illness: The patient is a 53 y.o. male who presents with complaints of low back pain.    Past Medical History:   Diagnosis Date    Arthritis     Chronic pain     Hypertension        Past Surgical History:   Procedure Laterality Date    BACK SURGERY           Current Outpatient Medications:     ibuprofen (MOTRIN) 200 mg tablet, Take by mouth every 6 (six) hours as needed for mild pain, Disp: , Rfl:     methylPREDNISolone 4 MG tablet therapy pack, Use as directed on package. Do not take other anti-inflammatory (NSAID) medications while on Medrol dose ruperto. You should not suddenly stop using these medications. Follow the instructions listed on the prescription about tapering your dose. (Patient not taking: Reported on 11/15/2023), Disp: 21 tablet, Rfl: 0    ondansetron (ZOFRAN-ODT) 4 mg disintegrating tablet, Take 1 tablet (4 mg total) by mouth every 6 (six) hours as needed for nausea or vomiting (Patient not taking: Reported on 10/25/2023), Disp: 20 tablet, Rfl: 0    Current Facility-Administered Medications:     iohexol (OMNIPAQUE) 300 mg/mL injection 0.4 mL, 0.4 mL, Intra-articular, Once, Flex Zamudio,     methylPREDNISolone acetate (DEPO-MEDROL) injection 80 mg, 80 mg, Intra-articular, Once, Flex Zamudio,     ropivacaine (NAROPIN) injection 2 mL, 2 mL, Intra-articular, Once, Flex Zamudio, DO    No Known Allergies    Physical Exam:   General: Awake, Alert, Oriented x 3, Mood and affect appropriate  Respiratory: Respirations even and unlabored  Cardiovascular: Peripheral pulses intact; no edema  Musculoskeletal Exam: Slow antalgic gait    ASA Score: III    Patient/Chart Verification  Patient ID Verified: Verbal  ID Band Applied: No  Consents Confirmed: Procedural, To be obtained in the Pre-Procedure area  Interval H&P(within 24 hr) Complete (required for Outpatients and Surgery Admit only): To be obtained in the Pre-Procedure area  Allergies Reviewed: Yes  Anticoag/NSAID held?: NA  Currently on  antibiotics?: NA    Assessment:   1. Sacroiliitis (HCC)        Plan: b/l SIJ

## 2024-01-29 ENCOUNTER — TELEPHONE (OUTPATIENT)
Dept: PAIN MEDICINE | Facility: CLINIC | Age: 54
End: 2024-01-29

## 2024-01-29 NOTE — TELEPHONE ENCOUNTER
Pt reports no improvement post inj  Pain level 6-7/10  Pt aware I will call next week for an update    B/L SIJ inj 1/22, F/u 2/8

## 2024-02-08 ENCOUNTER — OFFICE VISIT (OUTPATIENT)
Dept: PAIN MEDICINE | Facility: CLINIC | Age: 54
End: 2024-02-08
Payer: COMMERCIAL

## 2024-02-08 VITALS
HEIGHT: 69 IN | WEIGHT: 249 LBS | BODY MASS INDEX: 36.88 KG/M2 | HEART RATE: 82 BPM | SYSTOLIC BLOOD PRESSURE: 138 MMHG | TEMPERATURE: 97.9 F | DIASTOLIC BLOOD PRESSURE: 84 MMHG

## 2024-02-08 DIAGNOSIS — M47.816 LUMBAR SPONDYLOSIS: ICD-10-CM

## 2024-02-08 DIAGNOSIS — G89.29 CHRONIC BILATERAL LOW BACK PAIN WITHOUT SCIATICA: ICD-10-CM

## 2024-02-08 DIAGNOSIS — M96.1 LUMBAR POSTLAMINECTOMY SYNDROME: Primary | ICD-10-CM

## 2024-02-08 DIAGNOSIS — M54.50 CHRONIC BILATERAL LOW BACK PAIN WITHOUT SCIATICA: ICD-10-CM

## 2024-02-08 DIAGNOSIS — Z98.1 HISTORY OF LUMBAR FUSION: ICD-10-CM

## 2024-02-08 PROCEDURE — 99213 OFFICE O/P EST LOW 20 MIN: CPT | Performed by: PHYSICIAN ASSISTANT

## 2024-02-08 NOTE — PROGRESS NOTES
Assessment:  1. Lumbar postlaminectomy syndrome    2. History of lumbar fusion    3. Lumbar spondylosis    4. Chronic bilateral low back pain without sciatica        Plan:  While the patient was in the office today, I did have a thorough conversation regarding their chronic pain syndrome, medication management, and treatment plan options.    Unfortunately the patient has failed to respond to the most recent bilateral sacroiliac joint injection on 1/22/2024.  Prior to that he did not experience any relief with an L3-4 epidural steroid injection.  At this point, I would not recommend any further injection therapy patient was agreeable to this.    We discussed alternative therapies such as acupuncture or gentle chiropractic modalities.      The patient will follow-up with us on a as needed basis if the pain changes or worsens.  The patient was advised to contact the office should their symptoms worsen in the interim. The patient was agreeable and verbalized an understanding.        History of Present Illness:    The patient is a 53 y.o. male last seen on 1/22/2024 who presents for a follow up office visit in regards to chronic low back pain secondary to postlaminectomy pain syndrome, history of fusion surgery, lumbar degenerative disc disease and sacroiliitis   the patient currently reports chronic low back pain with referred pain patterns into the buttocks and hips.  He reports a pain score of 7 out of 10 and describes it as a constant dull, aching, sharp, throbbing and shooting type of pain.  It is made worse with prolonged standing, bending and walking at times.  Unfortunately he did not note any relief following the bilateral sacroiliac joint injections or a lumbar epidural steroid injection done prior to that.  He has tried and failed to respond to many different types of medications as well as chiropractic and physical therapy.      I have personally reviewed and/or updated the patient's past medical history,  "past surgical history, family history, social history, current medications, allergies, and vital signs today.       Review of Systems:    Review of Systems   Respiratory:  Negative for shortness of breath.    Cardiovascular:  Negative for chest pain.   Gastrointestinal:  Negative for constipation, diarrhea, nausea and vomiting.   Musculoskeletal:  Positive for gait problem. Negative for arthralgias, joint swelling (Joint stiffnes) and myalgias.   Skin:  Negative for rash.   Neurological:  Positive for weakness. Negative for dizziness and seizures.   All other systems reviewed and are negative.        Past Medical History:   Diagnosis Date   • Arthritis    • Chronic pain    • Hypertension        Past Surgical History:   Procedure Laterality Date   • BACK SURGERY         Family History   Problem Relation Age of Onset   • Hypertension Father    • Cancer Paternal Grandfather        Social History     Occupational History   • Not on file   Tobacco Use   • Smoking status: Every Day     Current packs/day: 2.00     Types: Cigarettes   • Smokeless tobacco: Never   Substance and Sexual Activity   • Alcohol use: Yes     Comment: social   • Drug use: Never   • Sexual activity: Never         Current Outpatient Medications:   •  ibuprofen (MOTRIN) 200 mg tablet, Take by mouth every 6 (six) hours as needed for mild pain, Disp: , Rfl:   •  ondansetron (ZOFRAN-ODT) 4 mg disintegrating tablet, Take 1 tablet (4 mg total) by mouth every 6 (six) hours as needed for nausea or vomiting (Patient not taking: Reported on 10/25/2023), Disp: 20 tablet, Rfl: 0    No Known Allergies    Physical Exam:    /84 (BP Location: Left arm, Patient Position: Sitting, Cuff Size: Large)   Pulse 82   Temp 97.9 °F (36.6 °C)   Ht 5' 9\" (1.753 m)   Wt 113 kg (249 lb)   BMI 36.77 kg/m²     Constitutional:normal, well developed, well nourished, alert, in no distress and non-toxic and no overt pain behavior.  Eyes:anicteric  HEENT:grossly " intact  Pulmonary:even and unlabored  Cardiovascular:No edema or pitting edema present  Skin:Normal without rashes or lesions and well hydrated  Psychiatric:Mood and affect appropriate  Neurologic:Cranial Nerves II-XII grossly intact  Musculoskeletal: scar from prior lumbar surgery       Imaging  No orders to display         No orders of the defined types were placed in this encounter.

## 2025-01-22 ENCOUNTER — TELEPHONE (OUTPATIENT)
Dept: PAIN MEDICINE | Facility: CLINIC | Age: 55
End: 2025-01-22

## 2025-01-22 NOTE — TELEPHONE ENCOUNTER
Pt stopped in the office to find out if he needs a new [facility] referral. Pt requesting another injection. [facility] referral     Last injection 24

## 2025-02-26 ENCOUNTER — TELEPHONE (OUTPATIENT)
Dept: PAIN MEDICINE | Facility: CLINIC | Age: 55
End: 2025-02-26

## 2025-02-26 NOTE — TELEPHONE ENCOUNTER
Received an email with [facility] approved referral scheduling request     S/w Pt to see if he wants to schedule ov with Elizabeth? Pt was last seen 2/8/24 and last injection 1/22/24    Pt requesting injections but wants to wait until after CT on 3/2 and MRI which he has not scheduled yet. Pt will call to schedule his MRI. Once MRI is scheduled he will call back to schedule ov with Elizabeth after MRI

## 2025-03-02 ENCOUNTER — HOSPITAL ENCOUNTER (OUTPATIENT)
Dept: CT IMAGING | Facility: HOSPITAL | Age: 55
Discharge: HOME | End: 2025-03-02
Payer: COMMERCIAL

## 2025-03-02 DIAGNOSIS — Z98.1 HISTORY OF LUMBAR FUSION: ICD-10-CM

## 2025-03-02 PROCEDURE — 72131 CT LUMBAR SPINE W/O DYE: CPT

## 2025-03-08 ENCOUNTER — HOSPITAL ENCOUNTER (OUTPATIENT)
Dept: RADIOLOGY | Facility: HOSPITAL | Age: 55
Discharge: HOME | End: 2025-03-08
Payer: COMMERCIAL

## 2025-03-08 DIAGNOSIS — M54.16 LUMBAR RADICULOPATHY: ICD-10-CM

## 2025-03-08 PROCEDURE — 72148 MRI LUMBAR SPINE W/O DYE: CPT

## 2025-03-19 ENCOUNTER — OFFICE VISIT (OUTPATIENT)
Dept: PAIN MEDICINE | Facility: CLINIC | Age: 55
End: 2025-03-19
Payer: COMMERCIAL

## 2025-03-19 VITALS
BODY MASS INDEX: 35.4 KG/M2 | HEIGHT: 69 IN | WEIGHT: 239 LBS | HEART RATE: 76 BPM | TEMPERATURE: 98.5 F | OXYGEN SATURATION: 95 %

## 2025-03-19 DIAGNOSIS — M79.18 MYOFASCIAL PAIN SYNDROME: ICD-10-CM

## 2025-03-19 DIAGNOSIS — G89.29 CHRONIC PAIN OF BOTH SHOULDERS: ICD-10-CM

## 2025-03-19 DIAGNOSIS — M47.816 LUMBAR SPONDYLOSIS: ICD-10-CM

## 2025-03-19 DIAGNOSIS — Z98.1 HISTORY OF LUMBAR FUSION: ICD-10-CM

## 2025-03-19 DIAGNOSIS — M46.1 SACROILIITIS (HCC): Primary | ICD-10-CM

## 2025-03-19 DIAGNOSIS — M96.1 LUMBAR POSTLAMINECTOMY SYNDROME: ICD-10-CM

## 2025-03-19 DIAGNOSIS — M25.512 CHRONIC PAIN OF BOTH SHOULDERS: ICD-10-CM

## 2025-03-19 DIAGNOSIS — M25.511 CHRONIC PAIN OF BOTH SHOULDERS: ICD-10-CM

## 2025-03-19 PROCEDURE — 99214 OFFICE O/P EST MOD 30 MIN: CPT | Performed by: PHYSICIAN ASSISTANT

## 2025-03-19 NOTE — PROGRESS NOTES
Assessment:  1. Sacroiliitis (HCC)    2. Lumbar spondylosis    3. History of lumbar fusion    4. Lumbar postlaminectomy syndrome    5. Myofascial pain syndrome    6. Chronic pain of both shoulders        Plan:  While the patient was in the office today, I did have a thorough conversation regarding their chronic pain syndrome, medication management, and treatment plan options.    Based on patient report and physical exam, the patient's symptomatology does seem to be consistent with sacroiliac mediated pain from sacroiliitis. We will schedule the patient for a bilateral SIJ injection to decrease any inflammatory component of the patient's pain symptoms.    Prescription for tizanidine 4 mg nightly sent to patient's pharmacy.    Referral for our orthopedic department to evaluate bilateral shoulder pain.    Consider gentle chiropractic therapy.    The patient was advised to contact the office should their symptoms worsen in the interim. The patient was agreeable and verbalized an understanding.        History of Present Illness:    The patient is a 54 y.o. male last seen on 2/8/2024 who presents for a follow up office visit chronic low back pain secondary to postlaminectomy pain syndrome/history of lumbar fusion with sacroiliac joint dysfunction.  The patient currently reports increased low back pain that he rates a 6 out of 10 and describes as a constant burning, dull, aching, sharp, throbbing, cramping, pressure-like and shooting pain.  Patient has referred pain patterns into bilateral buttocks and hips.  He has significant difficulty with daily living activities in particular sleeping.  Patient has not attended any recent chiropractic or physical therapy.  Patient underwent epidural steroid injections and SI joint injections in the past.  At the time, the patient reported no relief however in hindsight, he does feel that the injections may have provided more benefit than he initially thought.  Patient underwent new  MRI and CT of the lumbar spine which did not reveal any significant change from his prior studies.  Patient happens to mention that he has bilateral chronic shoulder pain that needs to be evaluated.    I have personally reviewed and/or updated the patient's past medical history, past surgical history, family history, social history, current medications, allergies, and vital signs today.       Review of Systems:    Review of Systems   Respiratory:  Negative for shortness of breath.    Cardiovascular:  Negative for chest pain.   Gastrointestinal:  Negative for constipation, diarrhea, nausea and vomiting.   Musculoskeletal:  Positive for gait problem. Negative for arthralgias, joint swelling (Joint stiffness) and myalgias.   Skin:  Negative for rash.   Neurological:  Positive for weakness. Negative for dizziness and seizures.   All other systems reviewed and are negative.        Past Medical History:   Diagnosis Date   • Arthritis    • Chronic pain    • Hypertension        Past Surgical History:   Procedure Laterality Date   • BACK SURGERY         Family History   Problem Relation Age of Onset   • Hypertension Father    • Cancer Paternal Grandfather        Social History     Occupational History   • Not on file   Tobacco Use   • Smoking status: Every Day     Current packs/day: 2.00     Types: Cigarettes   • Smokeless tobacco: Never   Substance and Sexual Activity   • Alcohol use: Yes     Comment: social   • Drug use: Never   • Sexual activity: Never         Current Outpatient Medications:   •  ibuprofen (MOTRIN) 200 mg tablet, Take by mouth every 6 (six) hours as needed for mild pain, Disp: , Rfl:   •  tiZANidine (ZANAFLEX) 4 mg tablet, Take 1 tablet (4 mg total) by mouth daily at bedtime, Disp: 30 tablet, Rfl: 2  •  ondansetron (ZOFRAN-ODT) 4 mg disintegrating tablet, Take 1 tablet (4 mg total) by mouth every 6 (six) hours as needed for nausea or vomiting (Patient not taking: Reported on 10/25/2023), Disp: 20 tablet,  "Rfl: 0    No Known Allergies    Physical Exam:    Pulse 76   Temp 98.5 °F (36.9 °C)   Ht 5' 9\" (1.753 m)   Wt 108 kg (239 lb)   SpO2 95%   BMI 35.29 kg/m²     Constitutional:normal, well developed, well nourished, alert, in no distress and non-toxic and no overt pain behavior.  Eyes:anicteric  HEENT:grossly intact  Neck:supple, symmetric, trachea midline and no masses   Pulmonary:even and unlabored  Cardiovascular:No edema or pitting edema present  Skin:Normal without rashes or lesions and well hydrated  Psychiatric:Mood and affect appropriate  Neurologic:Cranial Nerves II-XII grossly intact  Musculoskeletal: bilateral sacroiliac joint tenderness + Nav finger sign + Patricks test +Gaenslen's test+ Posterior pelvic pain provocation Limited range of motion of the lumbar spine      Imaging    MRI LUMBAR SPINE WITHOUT CONTRAST 3/8/2025 @ [facility]     INDICATION: M54.16: Radiculopathy, lumbar region.     COMPARISON: 11/10/2023     TECHNIQUE:  Multiplanar, multisequence imaging of the lumbar spine was performed. .        IMAGE QUALITY:  Diagnostic     FINDINGS:     VERTEBRAL BODIES:  There are 5 lumbar type vertebral bodies. Prior ALIF at L4-5 and L5-S1 with anterior hardware, unchanged in position. Arthrodesis of bone grafts placed within the disc spaces.     No scoliosis. No spondylolisthesis or spondylolysis. No compression fracture. Mild focal marrow edema within the anterior superior corner of the L4 vertebral body is degenerative in nature and grossly unchanged.     SACRUM:  Normal signal within the sacrum. No evidence of insufficiency or stress fracture.     DISTAL CORD AND CONUS:  Normal size and signal within the distal cord and conus.     PARASPINAL SOFT TISSUES:  Paraspinal soft tissues are unremarkable.     LOWER THORACIC DISC SPACES:  Normal disc height and signal.  No disc herniation, canal stenosis or foraminal narrowing.     LUMBAR DISC SPACES:     L1-L2:  Normal.     L2-L3:  Normal.     L3-L4: " Annular bulging with a small central disc herniation. Mild canal stenosis. No foraminal nerve impingement.     L4-L5: As described above there has been prior ALIF. Minor annular bulging and endplate degenerative change without canal stenosis or foraminal narrowing.     L5-S1: Prior ALIF. Minor annular bulging and foraminal endplate hypertrophic change. No canal stenosis or foraminal narrowing.     OTHER FINDINGS:  None.     IMPRESSION:     Stable postoperative change at L4-5 and L5-S1 with no canal stenosis or foraminal narrowing.     Stable mild noncompressive degenerative change at the L3-4 level.      CT LUMBAR SPINE WITHOUT CONTRAST 3/2/2025 [facility]     INDICATION:   Z98.1: Arthrodesis status.     COMPARISON: 11/10/2023     TECHNIQUE:  Contiguous axial images through the lumbar spine were obtained. Sagittal and coronal reconstructions were performed.     IV Contrast: None.     Radiation dose length product (DLP) for this visit:  893.18 mGy-cm .  This examination, like all CT scans performed in the [facility], was performed utilizing techniques to minimize radiation dose exposure, including the use of iterative   reconstruction and automated exposure control.     IMAGE QUALITY:  Diagnostic.     FINDINGS:     ALIGNMENT:  There are 5 lumbar type vertebral bodies. The patient is status post lumbar spinal fusion at L4-L5 and L5-S1. The orthopedic hardware is intact. No acute periprosthetic fractures are seen. There is incorporation of the disc spacers.     VERTEBRAE:  No fracture.  No lytic or blastic lesion.     DEGENERATIVE CHANGES:     Lower Thoracic spine:  Normal lower thoracic disc spaces.     Lumbar Spine:  L1-2: No central canal stenosis or foraminal stenosis.     L2-3: No central canal stenosis or foraminal stenosis.     L3-4: There is disc space narrowing. There is a bulging annulus. There is facet arthrosis. There is mild canal stenosis. There is mild foraminal narrowing.     L4-5:There  is a bulging annulus with dorsal osteophytosis. There is facet arthrosis. There is no significant canal stenosis or foraminal narrowing.     L5-S1: There is a bulging annulus. There is dorsal and marginal osteophytosis. There is facet arthrosis. There is no significant canal stenosis. There is no significant foraminal narrowing.     PARASPINAL SOFT TISSUES:  Normal.     OTHER: Benign-appearing lucency within the right iliac wing. There are atherosclerotic changes of the aorta.     IMPRESSION:     Postoperative and degenerative changes of the lumbar spine, as described above. No evidence for hardware complication.

## 2025-03-24 ENCOUNTER — HOSPITAL ENCOUNTER (OUTPATIENT)
Dept: RADIOLOGY | Facility: CLINIC | Age: 55
Discharge: HOME | End: 2025-03-24
Payer: COMMERCIAL

## 2025-03-24 VITALS
HEART RATE: 60 BPM | DIASTOLIC BLOOD PRESSURE: 72 MMHG | OXYGEN SATURATION: 98 % | TEMPERATURE: 97.1 F | SYSTOLIC BLOOD PRESSURE: 126 MMHG | RESPIRATION RATE: 18 BRPM

## 2025-03-24 DIAGNOSIS — M46.1 SACROILIITIS (HCC): ICD-10-CM

## 2025-03-24 PROCEDURE — 27096 INJECT SACROILIAC JOINT: CPT | Performed by: ANESTHESIOLOGY

## 2025-03-24 RX ORDER — METHYLPREDNISOLONE ACETATE 80 MG/ML
80 INJECTION, SUSPENSION INTRA-ARTICULAR; INTRALESIONAL; INTRAMUSCULAR; PARENTERAL; SOFT TISSUE ONCE
Status: COMPLETED | OUTPATIENT
Start: 2025-03-24 | End: 2025-03-24

## 2025-03-24 RX ORDER — ROPIVACAINE HYDROCHLORIDE 2 MG/ML
2 INJECTION, SOLUTION EPIDURAL; INFILTRATION; PERINEURAL ONCE
Status: COMPLETED | OUTPATIENT
Start: 2025-03-24 | End: 2025-03-24

## 2025-03-24 RX ADMIN — METHYLPREDNISOLONE ACETATE 80 MG: 80 INJECTION, SUSPENSION INTRA-ARTICULAR; INTRALESIONAL; INTRAMUSCULAR; SOFT TISSUE at 14:14

## 2025-03-24 RX ADMIN — IOHEXOL 0.4 ML: 300 INJECTION, SOLUTION INTRAVENOUS at 14:14

## 2025-03-24 RX ADMIN — ROPIVACAINE HYDROCHLORIDE 2 ML: 2 INJECTION EPIDURAL; INFILTRATION; PERINEURAL at 14:14

## 2025-03-24 NOTE — H&P
History of Present Illness: The patient is a 54 y.o. male who presents with complaints of low back pain.    Past Medical History:   Diagnosis Date    Arthritis     Chronic pain     Hypertension        Past Surgical History:   Procedure Laterality Date    BACK SURGERY           Current Outpatient Medications:     ibuprofen (MOTRIN) 200 mg tablet, Take by mouth every 6 (six) hours as needed for mild pain, Disp: , Rfl:     ondansetron (ZOFRAN-ODT) 4 mg disintegrating tablet, Take 1 tablet (4 mg total) by mouth every 6 (six) hours as needed for nausea or vomiting (Patient not taking: Reported on 10/25/2023), Disp: 20 tablet, Rfl: 0    tiZANidine (ZANAFLEX) 4 mg tablet, Take 1 tablet (4 mg total) by mouth daily at bedtime, Disp: 30 tablet, Rfl: 2    Current Facility-Administered Medications:     iohexol (OMNIPAQUE) 300 mg/mL injection 0.4 mL, 0.4 mL, Intra-articular, Once, Flex Zamudio DO    methylPREDNISolone acetate (DEPO-MEDROL) injection 80 mg, 80 mg, Intra-articular, Once, Flex Zamudio DO    ropivacaine (NAROPIN) injection 2 mL, 2 mL, Intra-articular, Once, Flex Zamudio DO    No Known Allergies    Physical Exam:   General: Awake, Alert, Oriented x 3, Mood and affect appropriate  Respiratory: Respirations even and unlabored  Cardiovascular: Peripheral pulses intact; no edema  Musculoskeletal Exam: Normal gait    ASA Score: 3         Assessment:   1. Sacroiliitis (HCC)        Plan: B/L SIJ

## 2025-03-24 NOTE — DISCHARGE INSTR - LAB

## 2025-04-05 ENCOUNTER — HOSPITAL ENCOUNTER (INPATIENT)
Facility: HOSPITAL | Age: 55
LOS: 3 days | Discharge: HOME | DRG: 392 | End: 2025-04-09
Attending: EMERGENCY MEDICINE | Admitting: STUDENT IN AN ORGANIZED HEALTH CARE EDUCATION/TRAINING PROGRAM
Payer: COMMERCIAL

## 2025-04-05 DIAGNOSIS — K57.20 DIVERTICULITIS OF LARGE INTESTINE WITH PERFORATION AND ABSCESS WITHOUT BLEEDING: ICD-10-CM

## 2025-04-05 DIAGNOSIS — K29.70 GASTRITIS WITHOUT BLEEDING, UNSPECIFIED CHRONICITY, UNSPECIFIED GASTRITIS TYPE: ICD-10-CM

## 2025-04-05 DIAGNOSIS — R10.13 EPIGASTRIC PAIN: ICD-10-CM

## 2025-04-05 DIAGNOSIS — I77.4 CELIAC ARTERY STENOSIS (HCC): ICD-10-CM

## 2025-04-05 DIAGNOSIS — K57.92 ACUTE DIVERTICULITIS: ICD-10-CM

## 2025-04-05 DIAGNOSIS — K52.9 GASTROENTERITIS: Primary | ICD-10-CM

## 2025-04-05 PROCEDURE — 93005 ELECTROCARDIOGRAM TRACING: CPT

## 2025-04-05 PROCEDURE — 99285 EMERGENCY DEPT VISIT HI MDM: CPT | Performed by: EMERGENCY MEDICINE

## 2025-04-05 PROCEDURE — 99285 EMERGENCY DEPT VISIT HI MDM: CPT

## 2025-04-06 ENCOUNTER — APPOINTMENT (INPATIENT)
Dept: ULTRASOUND IMAGING | Facility: HOSPITAL | Age: 55
DRG: 392 | End: 2025-04-06
Payer: COMMERCIAL

## 2025-04-06 ENCOUNTER — APPOINTMENT (EMERGENCY)
Dept: CT IMAGING | Facility: HOSPITAL | Age: 55
DRG: 392 | End: 2025-04-06
Payer: COMMERCIAL

## 2025-04-06 PROBLEM — R10.13 EPIGASTRIC PAIN: Status: ACTIVE | Noted: 2025-04-06

## 2025-04-06 PROBLEM — K57.32 SIGMOID DIVERTICULITIS: Status: ACTIVE | Noted: 2025-04-06

## 2025-04-06 PROBLEM — F10.10 ALCOHOL ABUSE: Status: ACTIVE | Noted: 2025-04-06

## 2025-04-06 PROBLEM — R79.89 ELEVATED LFTS: Status: ACTIVE | Noted: 2025-04-06

## 2025-04-06 PROBLEM — D72.829 LEUKOCYTOSIS: Status: ACTIVE | Noted: 2025-04-06

## 2025-04-06 PROBLEM — I77.4 CELIAC ARTERY STENOSIS (HCC): Status: ACTIVE | Noted: 2025-04-06

## 2025-04-06 LAB
2HR DELTA HS TROPONIN: 0 NG/L
ALBUMIN SERPL BCG-MCNC: 4.5 G/DL (ref 3.5–5)
ALP SERPL-CCNC: 87 U/L (ref 34–104)
ALT SERPL W P-5'-P-CCNC: 78 U/L (ref 7–52)
ANION GAP SERPL CALCULATED.3IONS-SCNC: 8 MMOL/L (ref 4–13)
AST SERPL W P-5'-P-CCNC: 45 U/L (ref 13–39)
ATRIAL RATE: 57 BPM
ATRIAL RATE: 60 BPM
BASOPHILS # BLD AUTO: 0.04 THOUSANDS/ÂΜL (ref 0–0.1)
BASOPHILS NFR BLD AUTO: 0 % (ref 0–1)
BILIRUB SERPL-MCNC: 0.51 MG/DL (ref 0.2–1)
BUN SERPL-MCNC: 28 MG/DL (ref 5–25)
CALCIUM SERPL-MCNC: 9.2 MG/DL (ref 8.4–10.2)
CARDIAC TROPONIN I PNL SERPL HS: 6 NG/L (ref ?–50)
CARDIAC TROPONIN I PNL SERPL HS: 6 NG/L (ref ?–50)
CHLORIDE SERPL-SCNC: 101 MMOL/L (ref 96–108)
CO2 SERPL-SCNC: 28 MMOL/L (ref 21–32)
CREAT SERPL-MCNC: 1.21 MG/DL (ref 0.6–1.3)
EOSINOPHIL # BLD AUTO: 0.1 THOUSAND/ÂΜL (ref 0–0.61)
EOSINOPHIL NFR BLD AUTO: 1 % (ref 0–6)
ERYTHROCYTE [DISTWIDTH] IN BLOOD BY AUTOMATED COUNT: 14.6 % (ref 11.6–15.1)
GFR SERPL CREATININE-BSD FRML MDRD: 67 ML/MIN/1.73SQ M
GLUCOSE SERPL-MCNC: 121 MG/DL (ref 65–140)
HCT VFR BLD AUTO: 54.6 % (ref 36.5–49.3)
HGB BLD-MCNC: 17.8 G/DL (ref 12–17)
IMM GRANULOCYTES # BLD AUTO: 0.15 THOUSAND/UL (ref 0–0.2)
IMM GRANULOCYTES NFR BLD AUTO: 1 % (ref 0–2)
LACTATE SERPL-SCNC: 1.2 MMOL/L (ref 0.5–2)
LIPASE SERPL-CCNC: 44 U/L (ref 11–82)
LYMPHOCYTES # BLD AUTO: 3.2 THOUSANDS/ÂΜL (ref 0.6–4.47)
LYMPHOCYTES NFR BLD AUTO: 19 % (ref 14–44)
MCH RBC QN AUTO: 27.9 PG (ref 26.8–34.3)
MCHC RBC AUTO-ENTMCNC: 32.6 G/DL (ref 31.4–37.4)
MCV RBC AUTO: 86 FL (ref 82–98)
MONOCYTES # BLD AUTO: 1.66 THOUSAND/ÂΜL (ref 0.17–1.22)
MONOCYTES NFR BLD AUTO: 10 % (ref 4–12)
NEUTROPHILS # BLD AUTO: 11.47 THOUSANDS/ÂΜL (ref 1.85–7.62)
NEUTS SEG NFR BLD AUTO: 69 % (ref 43–75)
NRBC BLD AUTO-RTO: 0 /100 WBCS
P AXIS: 64 DEGREES
P AXIS: 72 DEGREES
PLATELET # BLD AUTO: 234 THOUSANDS/UL (ref 149–390)
PMV BLD AUTO: 12 FL (ref 8.9–12.7)
POTASSIUM SERPL-SCNC: 4.1 MMOL/L (ref 3.5–5.3)
PR INTERVAL: 174 MS
PR INTERVAL: 184 MS
PROT SERPL-MCNC: 7.5 G/DL (ref 6.4–8.4)
QRS AXIS: 38 DEGREES
QRS AXIS: 39 DEGREES
QRSD INTERVAL: 110 MS
QRSD INTERVAL: 116 MS
QT INTERVAL: 400 MS
QT INTERVAL: 406 MS
QTC INTERVAL: 389 MS
QTC INTERVAL: 406 MS
RBC # BLD AUTO: 6.37 MILLION/UL (ref 3.88–5.62)
SODIUM SERPL-SCNC: 137 MMOL/L (ref 135–147)
T WAVE AXIS: 60 DEGREES
T WAVE AXIS: 64 DEGREES
VENTRICULAR RATE: 57 BPM
VENTRICULAR RATE: 60 BPM
WBC # BLD AUTO: 16.62 THOUSAND/UL (ref 4.31–10.16)

## 2025-04-06 PROCEDURE — 83605 ASSAY OF LACTIC ACID: CPT | Performed by: EMERGENCY MEDICINE

## 2025-04-06 PROCEDURE — 83690 ASSAY OF LIPASE: CPT | Performed by: EMERGENCY MEDICINE

## 2025-04-06 PROCEDURE — 93010 ELECTROCARDIOGRAM REPORT: CPT | Performed by: INTERNAL MEDICINE

## 2025-04-06 PROCEDURE — 99223 1ST HOSP IP/OBS HIGH 75: CPT | Performed by: INTERNAL MEDICINE

## 2025-04-06 PROCEDURE — 71275 CT ANGIOGRAPHY CHEST: CPT

## 2025-04-06 PROCEDURE — 96367 TX/PROPH/DG ADDL SEQ IV INF: CPT

## 2025-04-06 PROCEDURE — 36415 COLL VENOUS BLD VENIPUNCTURE: CPT | Performed by: EMERGENCY MEDICINE

## 2025-04-06 PROCEDURE — 99223 1ST HOSP IP/OBS HIGH 75: CPT | Performed by: SURGERY

## 2025-04-06 PROCEDURE — 96365 THER/PROPH/DIAG IV INF INIT: CPT

## 2025-04-06 PROCEDURE — 80053 COMPREHEN METABOLIC PANEL: CPT | Performed by: EMERGENCY MEDICINE

## 2025-04-06 PROCEDURE — 84484 ASSAY OF TROPONIN QUANT: CPT | Performed by: EMERGENCY MEDICINE

## 2025-04-06 PROCEDURE — 96375 TX/PRO/DX INJ NEW DRUG ADDON: CPT

## 2025-04-06 PROCEDURE — 93005 ELECTROCARDIOGRAM TRACING: CPT

## 2025-04-06 PROCEDURE — NC001 PR NO CHARGE: Performed by: SURGERY

## 2025-04-06 PROCEDURE — 96376 TX/PRO/DX INJ SAME DRUG ADON: CPT

## 2025-04-06 PROCEDURE — 76705 ECHO EXAM OF ABDOMEN: CPT

## 2025-04-06 PROCEDURE — 74174 CTA ABD&PLVS W/CONTRAST: CPT

## 2025-04-06 PROCEDURE — 99222 1ST HOSP IP/OBS MODERATE 55: CPT | Performed by: SURGERY

## 2025-04-06 PROCEDURE — 85025 COMPLETE CBC W/AUTO DIFF WBC: CPT | Performed by: EMERGENCY MEDICINE

## 2025-04-06 RX ORDER — ONDANSETRON 2 MG/ML
4 INJECTION INTRAMUSCULAR; INTRAVENOUS EVERY 4 HOURS PRN
Status: DISCONTINUED | OUTPATIENT
Start: 2025-04-06 | End: 2025-04-09 | Stop reason: HOSPADM

## 2025-04-06 RX ORDER — MORPHINE SULFATE 10 MG/ML
8 INJECTION, SOLUTION INTRAMUSCULAR; INTRAVENOUS ONCE
Status: COMPLETED | OUTPATIENT
Start: 2025-04-06 | End: 2025-04-06

## 2025-04-06 RX ORDER — PANTOPRAZOLE SODIUM 40 MG/10ML
40 INJECTION, POWDER, LYOPHILIZED, FOR SOLUTION INTRAVENOUS EVERY 12 HOURS SCHEDULED
Status: DISCONTINUED | OUTPATIENT
Start: 2025-04-06 | End: 2025-04-09 | Stop reason: HOSPADM

## 2025-04-06 RX ORDER — SODIUM CHLORIDE, SODIUM GLUCONATE, SODIUM ACETATE, POTASSIUM CHLORIDE, MAGNESIUM CHLORIDE, SODIUM PHOSPHATE, DIBASIC, AND POTASSIUM PHOSPHATE .53; .5; .37; .037; .03; .012; .00082 G/100ML; G/100ML; G/100ML; G/100ML; G/100ML; G/100ML; G/100ML
125 INJECTION, SOLUTION INTRAVENOUS CONTINUOUS
Status: DISCONTINUED | OUTPATIENT
Start: 2025-04-06 | End: 2025-04-07

## 2025-04-06 RX ORDER — FENTANYL CITRATE 50 UG/ML
50 INJECTION, SOLUTION INTRAMUSCULAR; INTRAVENOUS ONCE
Refills: 0 | Status: COMPLETED | OUTPATIENT
Start: 2025-04-06 | End: 2025-04-06

## 2025-04-06 RX ORDER — ACETAMINOPHEN 325 MG/1
650 TABLET ORAL EVERY 6 HOURS PRN
Status: DISCONTINUED | OUTPATIENT
Start: 2025-04-06 | End: 2025-04-09 | Stop reason: HOSPADM

## 2025-04-06 RX ORDER — LORAZEPAM 2 MG/ML
1 INJECTION INTRAMUSCULAR ONCE
Status: COMPLETED | OUTPATIENT
Start: 2025-04-06 | End: 2025-04-06

## 2025-04-06 RX ORDER — HYDROMORPHONE HCL/PF 1 MG/ML
1 SYRINGE (ML) INJECTION EVERY 4 HOURS PRN
Status: DISCONTINUED | OUTPATIENT
Start: 2025-04-06 | End: 2025-04-07

## 2025-04-06 RX ORDER — NICOTINE 21 MG/24HR
1 PATCH, TRANSDERMAL 24 HOURS TRANSDERMAL DAILY
Status: DISCONTINUED | OUTPATIENT
Start: 2025-04-06 | End: 2025-04-09 | Stop reason: HOSPADM

## 2025-04-06 RX ORDER — MORPHINE SULFATE 4 MG/ML
4 INJECTION, SOLUTION INTRAMUSCULAR; INTRAVENOUS ONCE
Status: COMPLETED | OUTPATIENT
Start: 2025-04-06 | End: 2025-04-06

## 2025-04-06 RX ORDER — ENOXAPARIN SODIUM 100 MG/ML
40 INJECTION SUBCUTANEOUS DAILY
Status: DISCONTINUED | OUTPATIENT
Start: 2025-04-06 | End: 2025-04-09 | Stop reason: HOSPADM

## 2025-04-06 RX ORDER — HYDROMORPHONE HCL/PF 1 MG/ML
0.5 SYRINGE (ML) INJECTION EVERY 4 HOURS PRN
Refills: 0 | Status: DISCONTINUED | OUTPATIENT
Start: 2025-04-06 | End: 2025-04-07

## 2025-04-06 RX ORDER — DIPHENHYDRAMINE HCL 25 MG
50 TABLET ORAL ONCE
Status: COMPLETED | OUTPATIENT
Start: 2025-04-06 | End: 2025-04-06

## 2025-04-06 RX ORDER — SUCRALFATE 1 G/1
1 TABLET ORAL
Status: DISCONTINUED | OUTPATIENT
Start: 2025-04-06 | End: 2025-04-09 | Stop reason: HOSPADM

## 2025-04-06 RX ADMIN — SUCRALFATE 1 G: 1 TABLET ORAL at 21:32

## 2025-04-06 RX ADMIN — PANTOPRAZOLE SODIUM 80 MG: 40 INJECTION, POWDER, FOR SOLUTION INTRAVENOUS at 01:22

## 2025-04-06 RX ADMIN — FENTANYL CITRATE 50 MCG: 50 INJECTION INTRAMUSCULAR; INTRAVENOUS at 02:31

## 2025-04-06 RX ADMIN — FENTANYL CITRATE 50 MCG: 50 INJECTION INTRAMUSCULAR; INTRAVENOUS at 03:32

## 2025-04-06 RX ADMIN — DIPHENHYDRAMINE HYDROCHLORIDE 50 MG: 25 TABLET ORAL at 21:32

## 2025-04-06 RX ADMIN — HYDROMORPHONE HYDROCHLORIDE 0.5 MG: 1 INJECTION, SOLUTION INTRAMUSCULAR; INTRAVENOUS; SUBCUTANEOUS at 10:24

## 2025-04-06 RX ADMIN — HYDROMORPHONE HYDROCHLORIDE 0.5 MG: 1 INJECTION, SOLUTION INTRAMUSCULAR; INTRAVENOUS; SUBCUTANEOUS at 14:46

## 2025-04-06 RX ADMIN — SUCRALFATE 1 G: 1 TABLET ORAL at 06:11

## 2025-04-06 RX ADMIN — PIPERACILLIN AND TAZOBACTAM 4.5 G: 4; .5 INJECTION, POWDER, FOR SOLUTION INTRAVENOUS at 08:41

## 2025-04-06 RX ADMIN — ACETAMINOPHEN 650 MG: 325 TABLET, FILM COATED ORAL at 09:27

## 2025-04-06 RX ADMIN — HYDROMORPHONE HYDROCHLORIDE 0.5 MG: 1 INJECTION, SOLUTION INTRAMUSCULAR; INTRAVENOUS; SUBCUTANEOUS at 06:11

## 2025-04-06 RX ADMIN — MORPHINE SULFATE 8 MG: 10 INJECTION INTRAVENOUS at 00:58

## 2025-04-06 RX ADMIN — LORAZEPAM 1 MG: 2 INJECTION INTRAMUSCULAR; INTRAVENOUS at 13:58

## 2025-04-06 RX ADMIN — TIZANIDINE 4 MG: 4 TABLET ORAL at 21:32

## 2025-04-06 RX ADMIN — PIPERACILLIN AND TAZOBACTAM 4.5 G: 4; .5 INJECTION, POWDER, FOR SOLUTION INTRAVENOUS at 17:18

## 2025-04-06 RX ADMIN — SODIUM CHLORIDE, SODIUM ACETATE ANHYDROUS, SODIUM GLUCONATE, POTASSIUM CHLORIDE, AND MAGNESIUM CHLORIDE 125 ML/HR: 526; 222; 502; 37; 30 INJECTION, SOLUTION INTRAVENOUS at 05:58

## 2025-04-06 RX ADMIN — FENTANYL CITRATE 50 MCG: 50 INJECTION INTRAMUSCULAR; INTRAVENOUS at 00:18

## 2025-04-06 RX ADMIN — PIPERACILLIN AND TAZOBACTAM 4.5 G: 4; .5 INJECTION, POWDER, FOR SOLUTION INTRAVENOUS at 03:06

## 2025-04-06 RX ADMIN — SODIUM CHLORIDE 1000 ML: 0.9 INJECTION, SOLUTION INTRAVENOUS at 01:22

## 2025-04-06 RX ADMIN — PANTOPRAZOLE SODIUM 40 MG: 40 INJECTION, POWDER, FOR SOLUTION INTRAVENOUS at 08:40

## 2025-04-06 RX ADMIN — MORPHINE SULFATE 8 MG: 10 INJECTION INTRAVENOUS at 03:06

## 2025-04-06 RX ADMIN — IOHEXOL 100 ML: 350 INJECTION, SOLUTION INTRAVENOUS at 00:36

## 2025-04-06 RX ADMIN — PANTOPRAZOLE SODIUM 40 MG: 40 INJECTION, POWDER, FOR SOLUTION INTRAVENOUS at 19:37

## 2025-04-06 RX ADMIN — SUCRALFATE 1 G: 1 TABLET ORAL at 17:18

## 2025-04-06 RX ADMIN — SODIUM CHLORIDE, SODIUM ACETATE ANHYDROUS, SODIUM GLUCONATE, POTASSIUM CHLORIDE, AND MAGNESIUM CHLORIDE 125 ML/HR: 526; 222; 502; 37; 30 INJECTION, SOLUTION INTRAVENOUS at 19:37

## 2025-04-06 RX ADMIN — HYDROMORPHONE HYDROCHLORIDE 0.5 MG: 1 INJECTION, SOLUTION INTRAMUSCULAR; INTRAVENOUS; SUBCUTANEOUS at 19:37

## 2025-04-06 RX ADMIN — NICOTINE 1 PATCH: 21 PATCH, EXTENDED RELEASE TRANSDERMAL at 11:45

## 2025-04-06 RX ADMIN — SUCRALFATE 1 G: 1 TABLET ORAL at 11:45

## 2025-04-06 RX ADMIN — MORPHINE SULFATE 4 MG: 4 INJECTION, SOLUTION INTRAMUSCULAR; INTRAVENOUS at 04:57

## 2025-04-06 NOTE — ASSESSMENT & PLAN NOTE
Transaminases elevated, trend, check acute viral hepatitis panel  Check RUQ US to exclude hepatobiliary disease

## 2025-04-06 NOTE — ASSESSMENT & PLAN NOTE
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 The distal celiac axis and its branches are reconstituted via collaterals. 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 There is anastomosis between the ANNA and SMA (arc of Riolan variant).  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 No acute vascular surgery intervention warranted at this time, low suspicion for mesenteric ischemia or for celiac axis stenosis to be source of pt's pain

## 2025-04-06 NOTE — ASSESSMENT & PLAN NOTE
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 The distal celiac axis and its branches are reconstituted via collaterals. 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 There is anastomosis between the ANNA and SMA (arc of Riolan variant).  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

## 2025-04-06 NOTE — CONSULTS
"Consultation - Vascular Surgery   Name: Luis Powell 54 y.o. male I MRN: [id number]  Unit/Bed#: ED 12 I Date of Admission: 4/5/2025   Date of Service: 4/6/2025 I Hospital Day: 0   Inpatient consult to Vascular Surgery  Consult performed by: Briseida Paez PA-C  Consult ordered by: Mauro Grullon DO        Physician Requesting Evaluation: Mauro Grullon DO   Reason for Evaluation / Principal Problem: epigastric pain    Assessment & Plan  Celiac artery stenosis (HCC)  CTA: There is significant inflammatory change within the mesentery of the upper abdomen in the region of the distal stomach, first and second portion of the duodenum, and a short segment of jejunum. Although this is nonspecific it may possibly be related to ulcer disease. mild diverticulitis. High grade stenosis vs occlusion of proximal celiac axis for 12 mm. The distal celiac axis and its branches are reconstituted via collaterals. 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   - discussed with vascular fellow on call. Sma occlusion seen on imaging from 2023. No acute vascular surgery intervention warranted at this time  - low suspicion for mesenteric ischemia or for celiac axis stenosis to be source of pt's pain  - rec outpt f/u  - if clinically worsens or exam changes, will reevaluate  - rec smoking cessation  - consider asa/statin    Rest of care per primary  Please contact the SecureChat role for the Vascular Surgery service with any questions/concerns.    History of Present Illness   Luis Powell is a 54 y.o. male PMH HTN, chronic back/shoulder pain, hx of ALIF who presents with abdominal pain. Pt states pain started in epigastrium approx 24 hours ago. Had severe sudden onset epigastric pain starting at 1 am yesterday, associated with nonbloody, nonbilious emesis. Pain is described as \"knot\", comes in waves. States had similar episode several years ago when daughter was testifying against someone in court, however that resolved on " "it's own within 24 hours. Pt denies any fevers, chills, cp, sob, changes to bowel or bladder habits. Denies change in pain with food. Per pt, pain comes on randomly. Endorses still having appetite. Smokes 2 PPD. Drinks approx 7-8 beers every other day to every couple days. Occasional ibuprofen use.   Hx of diverticulitis with prior c-scope in 2019 (polyps). Due for another colonoscopy. No prior EGD.   Discussed statin with pt who states \"oh no I'm not taking that\".    Review of Systems   Constitutional:  Positive for diaphoresis. Negative for appetite change, chills and fever.   Respiratory: Negative.     Cardiovascular: Negative.    Gastrointestinal:  Positive for abdominal pain and vomiting. Negative for abdominal distention, constipation, diarrhea and nausea.   Musculoskeletal: Negative.    Skin: Negative.    All other systems reviewed and are negative.    Medical History Review: I have reviewed the patient's PMH, PSH, Social History, Family History, Meds, and Allergies   Historical Information   Past Medical History:   Diagnosis Date    Arthritis     Chronic pain     Hypertension      Past Surgical History:   Procedure Laterality Date    BACK SURGERY       Social History     Tobacco Use    Smoking status: Every Day     Current packs/day: 2.00     Types: Cigarettes    Smokeless tobacco: Never   Substance and Sexual Activity    Alcohol use: Yes     Comment: social    Drug use: Never    Sexual activity: Never     E-Cigarette/Vaping     E-Cigarette/Vaping Substances     Family History   Problem Relation Age of Onset    Hypertension Father     Cancer Paternal Grandfather      Social History     Tobacco Use    Smoking status: Every Day     Current packs/day: 2.00     Types: Cigarettes    Smokeless tobacco: Never   Substance and Sexual Activity    Alcohol use: Yes     Comment: social    Drug use: Never    Sexual activity: Never       Objective :  Temp:  [97.6 °F (36.4 °C)] 97.6 °F (36.4 °C)  HR:  [52-62] 53  BP: " (142-252)/() 151/97  Resp:  [15-22] 19  SpO2:  [97 %-100 %] 98 %  O2 Device: None (Room air)      Physical Exam  Vitals and nursing note reviewed.   Constitutional:       General: He is not in acute distress.     Appearance: Normal appearance. He is normal weight. He is not ill-appearing, toxic-appearing or diaphoretic.   HENT:      Head: Normocephalic and atraumatic.   Eyes:      Extraocular Movements: Extraocular movements intact.   Cardiovascular:      Rate and Rhythm: Normal rate.   Pulmonary:      Effort: Pulmonary effort is normal. No respiratory distress.   Abdominal:      General: Abdomen is flat. There is no distension.      Palpations: Abdomen is soft.      Tenderness: There is abdominal tenderness. There is no guarding or rebound.      Comments: Mild epigastric tenderness. Soft, no peritoneal signs   Musculoskeletal:         General: No swelling or tenderness.   Skin:     General: Skin is warm.      Capillary Refill: Capillary refill takes less than 2 seconds.   Neurological:      General: No focal deficit present.      Mental Status: He is alert and oriented to person, place, and time.   Psychiatric:         Mood and Affect: Mood normal.         Behavior: Behavior normal.         Lab Results: I have reviewed the following results:  Recent Labs     04/06/25  0020 04/06/25  0124 04/06/25  0218   WBC 16.62*  --   --    HGB 17.8*  --   --    HCT 54.6*  --   --      --   --    SODIUM 137  --   --    K 4.1  --   --      --   --    CO2 28  --   --    BUN 28*  --   --    CREATININE 1.21  --   --    GLUC 121  --   --    AST 45*  --   --    ALT 78*  --   --    ALB 4.5  --   --    TBILI 0.51  --   --    ALKPHOS 87  --   --    HSTNI0 6  --   --    HSTNI2  --   --  6   LACTICACID  --  1.2  --        Imaging Results Review: I reviewed radiology reports from this admission including: CT A c/a/p.  Other Study Results Review: No additional pertinent studies reviewed.    VTE Pharmacologic Prophylaxis:  VTE covered by:    None     VTE Mechanical Prophylaxis: sequential compression device    Administrative Statements   I have spent a total time of 30 minutes in caring for this patient on the day of the visit/encounter including Diagnostic results, Prognosis, Risks and benefits of tx options, Instructions for management, Patient and family education, Importance of tx compliance, Risk factor reductions, Impressions, Counseling / Coordination of care, Documenting in the medical record, Reviewing/placing orders in the medical record (including tests, medications, and/or procedures), Obtaining or reviewing history  , and Communicating with other healthcare professionals .

## 2025-04-06 NOTE — ASSESSMENT & PLAN NOTE
CTA: There is significant inflammatory change within the mesentery of the upper abdomen in the region of the distal stomach, first and second portion of the duodenum, and a short segment of jejunum. Although this is nonspecific it may possibly be related to ulcer disease. mild diverticulitis. High grade stenosis vs occlusion of proximal celiac axis for 12 mm. The distal celiac axis and its branches are reconstituted via collaterals. 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       high suspicion for PUD  N.p.o./IV fluids  Continue PPI twice daily and Carafate  Appreciate GI consult and recommendations.  Plan for EGD tomorrow

## 2025-04-06 NOTE — CONSULTS
Consultation - Gastroenterology   Name: Luis Powell 54 y.o. male I MRN: [id number]  Unit/Bed#: -01 I Date of Admission: 4/5/2025   Date of Service: 4/6/2025 I Hospital Day: 0   Inpatient consult to gastroenterology  Consult performed by: Enedina Maldonado PA-C  Consult ordered by: Apple Munguia PA-C        Physician Requesting Evaluation: Marilia Anaya MD   Reason for Evaluation / Principal Problem: PUS, gastroenteritis    Assessment & Plan  Epigastric pain  54-year-old male presents with intermittent severe epigastric/substernal chest pain and vomiting since Friday.  Labs remarkable for leukocytosis and elevated transaminases.  CTA chest abdomen pelvis showed inflammation of the distal stomach, small bowel, acute uncomplicated sigmoid diverticulitis, celiac artery stenosis with collaterals, 50% SMA stenosis.  Evaluated by surgery recommend PPI, Carafate GI eval.  DD includes infectious gastroenteritis vs PUD, perforation? in setting of tobacco and alcohol use.    NPO/IVF/antiemetics/pain control  Agree with PPI 40 mg IV BID,  Carafate 1 g QID  RUQ US rule out hepatobiliary disease  To consider EGD to evaluate for above differential diagnosis tomorrow  Counseled on smoking and alcohol cessation    Elevated LFTs  Transaminases elevated, trend, check acute viral hepatitis panel  Check RUQ US to exclude hepatobiliary disease    Celiac artery stenosis (HCC)  CTA: There is either high-grade stenosis versus occlusion of the proximal celiac axis spanning a length of approximately 12 mm. The distal celiac axis and its branches are reconstituted via collaterals. 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 There is anastomosis between the ANNA and SMA (arc of Riolan variant).  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 No acute vascular surgery intervention warranted at this time, low suspicion for mesenteric ischemia or for celiac axis stenosis to be source of pt's pain    Sigmoid  diverticulitis  Acute uncomplicated mild sigmoid diverticulitis on CT  No lower abdominal pain on exam  On Zosyn per primary team  Needs outpatient colonoscopy 6-8 weeks due to history of 1 cm adenoma in 2019    Leukocytosis  Trend, panculture  On Zosyn per primary team    Alcohol abuse  Counseled on alcohol cessation  Recommend CIWA protocol, thiamine, folic acid and MVI replacement per primary team    Chronic left-sided low back pain with left-sided sciatica  On tizanidine  Drinks 4-10 beers a day in order to sleep due to pain  Management per primary team      History of Present Illness   HPI:  Luis Powell is a 54 y.o. male with PMH back pain, diverticulitis, tobacco and alcohol use, obesity presents with epigastric/substernal chest pain intermittent nonradiating severe accompanied by nonbloody vomiting since Friday.  Denies NSAID use.  Does drink 4-10 beers every other day to sleep due to back pain.  Denies recent GERD, sick contacts antibiotic use travel overseas.  Had normal BM yesterday without bleeding.  Eating well and weight stable.  Never had endoscopy.    Colonoscopy 9/2019 after sigmoid diverticulitis with microperforation managed conservatively showed 1 cm adenomatous polyp, diverticulosis and hemorrhoids recommend repeat in 3 years.    Labs show leukocytosis 16.6.  ALT 78, AST 45.  CTA C/A/P shows  significant inflammatory change within the mesentery of the upper abdomen in the region of the distal stomach, first and second portion of the duodenum, and a short segment of jejunum. Although this is nonspecific it may possibly be related to ulcer disease. Mild acute sigmoid diverticulitis. There is either high-grade stenosis versus occlusion of the proximal celiac axis spanning a length of approximately 12 mm. The distal celiac axis and its branches are reconstituted via collaterals. 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 There is anastomosis between the ANNA and SMA (arc of Riolan  variant).  Patient was evaluated by general and vascular surgery recommend GI consult, PPI, carafate, Zosyn, vascular findings chronic recommend outpatient follow-up.     Review of Systems  Constitutional: denies fatigue, fever.  HEENT: denies visual disturbance, postnasal drip, sore throat.  Respiratory: denies cough, shortness of breath.  Cardiovascular: denies chest pain, leg swelling.  Gastrointestinal: as noted above in HPI.  : denies difficulty urinating, dysuria.  Musculoskeletal: denies arthralgias, +back pain.  Neurological: denies dizziness, syncope.  Psychiatric: denies confusion, anxiety.      Objective :  Temp:  [97.6 °F (36.4 °C)-98.4 °F (36.9 °C)] 98.4 °F (36.9 °C)  HR:  [52-68] 53  BP: (124-252)/() 129/80  Resp:  [15-22] 20  SpO2:  [96 %-100 %] 96 %  O2 Device: None (Room air)    Physical Exam  Constitutional: Well-developed, no acute distress  HEENT: normocephalic, mucous membranes moist.  Neck: Supple  Skin: warm and dry  Respiratory: Lungs are clear to auscultation B/L.  Cardiovascular: Heart is regular rate and rhythm.  Gastrointestinal: obese, epigastric/substernal tenderness, nondistended with normal active bowel sounds.  No masses, guarding, rebound.   Rectal Exam: Deferred.  Extremities: No edema.  Neurologic: Nonfocal. A & O ×3.   Psychiatric: Normal affect.      Lab Results: I have reviewed the following results:CBC/BMP:   .     04/06/25  0020   WBC 16.62*   HGB 17.8*   HCT 54.6*      SODIUM 137   K 4.1      CO2 28   BUN 28*   CREATININE 1.21   GLUC 121    , Creatinine Clearance: Estimated Creatinine Clearance: 84.5 mL/min (by C-G formula based on SCr of 1.21 mg/dL)., LFTs:   .     04/06/25  0020   AST 45*   ALT 78*   ALB 4.5   TBILI 0.51   ALKPHOS 87    , PTT/INR:No new results in last 24 hours. , Troponin,BNP:  .     04/06/25  0020 04/06/25  0218   HSTNI0 6  --    HSTNI2  --  6    , Lactic Acid:   .     04/06/25  0124   LACTICACID 1.2        Imaging Results Review: I  reviewed radiology reports from this admission including: CT abdomen/pelvis.

## 2025-04-06 NOTE — PROGRESS NOTES
Progress Note - Surgery-General   Name: Luis Powell 54 y.o. male I MRN: [id number]  Unit/Bed#: -01 I Date of Admission: 4/5/2025   Date of Service: 4/6/2025 I Hospital Day: 0       54-year-old male with hypertension, chronic back pain, alcohol abuse, tobacco use here with epigastric pain and CT findings of celiac artery stenosis, inflammatory changes of the upper abdomen indicating possible posterior peptic ulcer, uncomplicated sigmoid diverticulitis  Assessment & Plan  Epigastric pain    CTA: There is significant inflammatory change within the mesentery of the upper abdomen in the region of the distal stomach, first and second portion of the duodenum, and a short segment of jejunum. Although this is nonspecific it may possibly be related to ulcer disease. mild diverticulitis. High grade stenosis vs occlusion of proximal celiac axis for 12 mm. The distal celiac axis and its branches are reconstituted via collaterals. 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       high suspicion for PUD  N.p.o./IV fluids  Continue PPI twice daily and Carafate  Appreciate GI consult and recommendations.  Plan for EGD tomorrow  Chronic left-sided low back pain with left-sided sciatica    Celiac artery stenosis (HCC)  Stenosis seen on imaging in 2023.  Unlikely to be source of pain  Plan for outpatient follow-up  Sigmoid diverticulitis  Uncomplicated, as evidenced by CT scan  On Zosyn per primary team    Alcohol abuse  CIWA protocol    Leukocytosis  Increased 16.62 from 13 today.  Continue IV antibiotics  Continue to trend  Jarrett afebrile, vital signs stable    Elevated LFTs  Plan for liver ultrasound per GI team    Surgery-General service will follow.    Subjective   Patient reports pain is coming and going in waves.  He is having episodes every few hours which are intense and associated with flushing, hypertension and emesis.  He is currently not complaining of pain.    Objective :  Temp:  [97.6 °F (36.4 °C)-98.4  °F (36.9 °C)] 98.4 °F (36.9 °C)  HR:  [52-68] 53  BP: (124-252)/() 129/80  Resp:  [15-22] 20  SpO2:  [96 %-100 %] 96 %  O2 Device: None (Room air)    I/O         04/04 0701 04/05 0700 04/05 0701 04/06 0700 04/06 0701 04/07 0700    P.O.  30     Total Intake(mL/kg)  30 (0.3)     Urine (mL/kg/hr)  0     Total Output  0     Net  +30                    Physical Exam  Constitutional:       Appearance: Normal appearance.   HENT:      Head: Normocephalic and atraumatic.      Nose: Nose normal.      Mouth/Throat:      Mouth: Mucous membranes are moist.   Eyes:      Extraocular Movements: Extraocular movements intact.      Pupils: Pupils are equal, round, and reactive to light.   Cardiovascular:      Rate and Rhythm: Normal rate.   Pulmonary:      Effort: Pulmonary effort is normal.   Abdominal:      General: There is no distension.      Palpations: Abdomen is soft. There is no mass.      Tenderness: There is abdominal tenderness (Epigastrium). There is no guarding.   Musculoskeletal:      Cervical back: Neck supple.   Skin:     General: Skin is warm and dry.   Neurological:      Mental Status: He is alert and oriented to person, place, and time.   Psychiatric:         Mood and Affect: Mood normal.         Behavior: Behavior normal.           Lab Results: I have reviewed the following results:  Recent Labs     04/06/25  0020 04/06/25  0124 04/06/25  0218   WBC 16.62*  --   --    HGB 17.8*  --   --    HCT 54.6*  --   --      --   --    SODIUM 137  --   --    K 4.1  --   --      --   --    CO2 28  --   --    BUN 28*  --   --    CREATININE 1.21  --   --    GLUC 121  --   --    AST 45*  --   --    ALT 78*  --   --    ALB 4.5  --   --    TBILI 0.51  --   --    ALKPHOS 87  --   --    HSTNI0 6  --   --    HSTNI2  --   --  6   LACTICACID  --  1.2  --        Imaging Results Review: I reviewed radiology reports from this admission including: CT abdomen/pelvis.  Other Study Results Review: No additional pertinent  studies reviewed.    VTE Pharmacologic Prophylaxis: VTE covered by:  enoxaparin, Subcutaneous     VTE Mechanical Prophylaxis: sequential compression device

## 2025-04-06 NOTE — ASSESSMENT & PLAN NOTE
WBC 16.62K on admission suspect secondary to acute diverticulitis  No other SIRS criteria met  Continue IV Zosyn

## 2025-04-06 NOTE — ASSESSMENT & PLAN NOTE
Acute uncomplicated mild sigmoid diverticulitis on CT  No lower abdominal pain on exam  On Zosyn per primary team  Needs outpatient colonoscopy 6-8 weeks due to history of 1 cm adenoma in 2019

## 2025-04-06 NOTE — ASSESSMENT & PLAN NOTE
"CT A/P: \"Mild acute sigmoid diverticulitis. \"  History of sigmoid diverticulitis with microperforation and abscess in 2019-treated conservatively with IVF and bowel rest  Will continue IV Zosyn for now and surgery following  "

## 2025-04-06 NOTE — ASSESSMENT & PLAN NOTE
Counseled on alcohol cessation  Recommend CIWA protocol, thiamine, folic acid and MVI replacement per primary team

## 2025-04-06 NOTE — CONSULTS
"Consultation - Surgery-General   Name: Luis Powell 54 y.o. male I MRN: [id number]  Unit/Bed#: ED 12 I Date of Admission: 4/5/2025   Date of Service: 4/6/2025 I Hospital Day: 0   Consult to surgery general  Consult performed by: Briseida Paez PA-C  Consult ordered by: Mauro Grullon DO        Physician Requesting Evaluation: Mauro Grullon DO   Reason for Evaluation / Principal Problem: epigastric pain    Assessment & Plan  Epigastric pain  CTA: There is significant inflammatory change within the mesentery of the upper abdomen in the region of the distal stomach, first and second portion of the duodenum, and a short segment of jejunum. Although this is nonspecific it may possibly be related to ulcer disease. mild diverticulitis. High grade stenosis vs occlusion of proximal celiac axis for 12 mm. The distal celiac axis and its branches are reconstituted via collaterals. 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     - high suspicion for PUD    - rec SLIM admission, GI consult  - NPO/IVF  - prn pain, antiemetic regimen  - bid PPI  - carafate  - CIWA protocol    Rest of care per primary  Please contact the SecureChat role for the Surgery-General service with any questions/concerns.    History of Present Illness   Luis Powell is a 54 y.o. male PMH HTN, chronic back/shoulder pain, hx of ALIF who presents with abdominal pain. Pt states pain started in epigastrium approx 24 hours ago. Had severe sudden onset epigastric pain starting at 1 am yesterday, associated with nonbloody, nonbilious emesis. Pain is described as \"knot\", comes in waves. States had similar episode several years ago when daughter was testifying against someone in court, however that resolved on it's own within 24 hours. Pt denies any fevers, chills, cp, sob, changes to bowel or bladder habits. Smokes 2 PPD. Drinks approx 7-8 beers every other day to every couple days. Occasional ibuprofen use.   Hx of diverticulitis with prior c-scope " in 2019 (polyps). Due for another colonoscopy. No prior EGD.    Review of Systems   Constitutional:  Positive for diaphoresis. Negative for appetite change and fever.   Respiratory: Negative.     Cardiovascular: Negative.    Gastrointestinal:  Positive for abdominal pain and vomiting. Negative for abdominal distention, constipation, diarrhea and nausea.   Genitourinary: Negative.    Musculoskeletal: Negative.    Skin: Negative.    All other systems reviewed and are negative.    Medical History Review: I have reviewed the patient's PMH, PSH, Social History, Family History, Meds, and Allergies   Historical Information   Past Medical History:   Diagnosis Date    Arthritis     Chronic pain     Hypertension      Past Surgical History:   Procedure Laterality Date    BACK SURGERY       Social History     Tobacco Use    Smoking status: Every Day     Current packs/day: 2.00     Types: Cigarettes    Smokeless tobacco: Never   Substance and Sexual Activity    Alcohol use: Yes     Comment: social    Drug use: Never    Sexual activity: Never     E-Cigarette/Vaping     E-Cigarette/Vaping Substances     Family History   Problem Relation Age of Onset    Hypertension Father     Cancer Paternal Grandfather      Social History     Tobacco Use    Smoking status: Every Day     Current packs/day: 2.00     Types: Cigarettes    Smokeless tobacco: Never   Substance and Sexual Activity    Alcohol use: Yes     Comment: social    Drug use: Never    Sexual activity: Never       Objective :  Temp:  [97.6 °F (36.4 °C)] 97.6 °F (36.4 °C)  HR:  [52-62] 52  BP: (142-252)/() 175/74  Resp:  [15-20] 16  SpO2:  [97 %-100 %] 98 %  O2 Device: None (Room air)      Physical Exam  Vitals and nursing note reviewed.   Constitutional:       General: He is not in acute distress.     Appearance: Normal appearance. He is obese. He is not ill-appearing, toxic-appearing or diaphoretic.   HENT:      Head: Normocephalic and atraumatic.   Cardiovascular:       Rate and Rhythm: Normal rate.   Pulmonary:      Effort: Pulmonary effort is normal. No respiratory distress.   Abdominal:      General: There is no distension.      Palpations: Abdomen is soft.      Tenderness: There is abdominal tenderness. There is no guarding or rebound.      Comments: Mildly tender in epigastrium. Scar from prior incision from ALIF   Skin:     General: Skin is warm.      Capillary Refill: Capillary refill takes less than 2 seconds.   Neurological:      General: No focal deficit present.      Mental Status: He is alert and oriented to person, place, and time.   Psychiatric:         Mood and Affect: Mood normal.         Behavior: Behavior normal.         Lab Results: I have reviewed the following results:  Recent Labs     04/06/25  0020 04/06/25  0124 04/06/25  0218   WBC 16.62*  --   --    HGB 17.8*  --   --    HCT 54.6*  --   --      --   --    SODIUM 137  --   --    K 4.1  --   --      --   --    CO2 28  --   --    BUN 28*  --   --    CREATININE 1.21  --   --    GLUC 121  --   --    AST 45*  --   --    ALT 78*  --   --    ALB 4.5  --   --    TBILI 0.51  --   --    ALKPHOS 87  --   --    HSTNI0 6  --   --    HSTNI2  --   --  6   LACTICACID  --  1.2  --        Imaging Results Review: I reviewed radiology reports from this admission including: CT A c/a/p.  Other Study Results Review: No additional pertinent studies reviewed.    VTE Pharmacologic Prophylaxis: VTE covered by:    None     VTE Mechanical Prophylaxis: sequential compression device    Administrative Statements   I have spent a total time of 30 minutes in caring for this patient on the day of the visit/encounter including Diagnostic results, Prognosis, Risks and benefits of tx options, Instructions for management, Patient and family education, Importance of tx compliance, Risk factor reductions, Impressions, Counseling / Coordination of care, Documenting in the medical record, Reviewing/placing orders in the medical record  (including tests, medications, and/or procedures), Obtaining or reviewing history  , and Communicating with other healthcare professionals .

## 2025-04-06 NOTE — H&P
"H&P - Hospitalist   Name: Luis Powell 54 y.o. male I MRN: [id number]  Unit/Bed#: -01 I Date of Admission: 4/5/2025   Date of Service: 4/6/2025 I Hospital Day: 0     Assessment & Plan  Epigastric pain  Episodic epigastric AP that onset 4/4 at 0100 with associated nausea and vomiting  CT A/P: \"There is significant inflammatory change within the mesentery of the upper abdomen in the region of the distal stomach, first and second portion of the duodenum, and a short segment of jejunum. Although this is nonspecific it may possibly be related to ulcer disease.\"  Is at risk for PUD with extensive smoking history   Reports rare NSAID use   evaluated by surgery in the ED-high suspicion for PUD, recommends:  PPI twice daily  Carafate 4 times daily  GI consult  N.p.o. sips with meds for now  GI consulted for possible EGD  Celiac artery stenosis (HCC)  CT A/P: \"There is either high-grade stenosis versus occlusion of the proximal celiac axis spanning a length of approximately 12 mm. The distal celiac axis and its branches are reconstituted via collaterals. 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 There is anastomosis between the ANNA and SMA (arc of Riolan variant).  \"  Imaging reviewed by vascular surgery-states that celiac artery stenosis was present on imaging from 2023 and lactic acid is normal, therefore do not suspect this is etiology for symptoms  No indication for anticoagulation at this time  Follow-up outpatient  If symptoms fail to improve-consider reevaluation  Sigmoid diverticulitis  CT A/P: \"Mild acute sigmoid diverticulitis. \"  History of sigmoid diverticulitis with microperforation and abscess in 2019-treated conservatively with IVF and bowel rest  Will continue IV Zosyn for now and surgery following  Leukocytosis  WBC 16.62K on admission suspect secondary to acute diverticulitis  No other SIRS criteria met  Continue IV Zosyn  Alcohol abuse  Drink 7-8 beers every other day   CIWA protocol  Last " "drink 4/5   Chronic left-sided low back pain with left-sided sciatica  Continue home tizanidine 4 Mg at bedtime      VTE Pharmacologic Prophylaxis: VTE Score: 3 Moderate Risk (Score 3-4) - Pharmacological DVT Prophylaxis Ordered: enoxaparin (Lovenox).  Code Status: Level 1 - Full Code   Discussion with family: Patient declined call to .     Anticipated Length of Stay: Patient will be admitted on an inpatient basis with an anticipated length of stay of greater than 2 midnights secondary to epigastric pain with concern for PUD, sigmoid diverticulitis, leukocytosis, alcohol abuse.    History of Present Illness   Chief Complaint: \" Abdominal pain\"    Luis Powell is a 54 y.o. male with a PMH of chronic low back pain and diverticulitis who presents with epigastric abdominal pain that onset Friday morning.  Patient reports the pain is severe in nature and is episodic.  When he experiences that he becomes diaphoretic and feels the need to vomit.  Symptoms will then fully resolved and he will feel fine.  Friday he had intermittent pain and vomiting all day.  Saturday he felt fine all day and was able to eat wings and pizza without issue.  Just prior to arrival to the ED Saturday evening, he had recurrent pain so he presented to the ED.  He does endorse increased alcohol intake over the last 3 days.  Drinks 6-12 beers every other day to every couple of days.  1.5 PPD since age of 18.  Endorses very rare NSAID use.  Reports that he started with lower abdominal pain a few weeks ago.  Reports pain is 6/10 in severity at time of admission.  Admits to ongoing insomnia in setting of chronic back pain.    Review of Systems   Constitutional:  Positive for diaphoresis. Negative for chills and fever.   HENT:  Negative for congestion.    Respiratory:  Negative for cough and shortness of breath.    Cardiovascular:  Negative for chest pain and leg swelling.   Gastrointestinal:  Positive for abdominal pain, nausea and " vomiting. Negative for constipation and diarrhea.   Genitourinary:  Negative for difficulty urinating, dysuria and hematuria.   Musculoskeletal:  Negative for gait problem.   Neurological:  Negative for weakness and numbness.   Psychiatric/Behavioral:  Positive for sleep disturbance.    All other systems reviewed and are negative.      Historical Information   Past Medical History:   Diagnosis Date    Arthritis     Chronic pain     Hypertension      Past Surgical History:   Procedure Laterality Date    BACK SURGERY       Social History     Tobacco Use    Smoking status: Every Day     Current packs/day: 2.00     Types: Cigarettes    Smokeless tobacco: Never   Substance and Sexual Activity    Alcohol use: Yes     Alcohol/week: 6.0 standard drinks of alcohol     Types: 6 Cans of beer per week     Comment: social    Drug use: Never    Sexual activity: Not on file     E-Cigarette/Vaping     E-Cigarette/Vaping Substances     Family History   Problem Relation Age of Onset    Hypertension Father     Cancer Paternal Grandfather      Social History:  Marital Status: /Civil Union   Occupation: Disabled   Patient Pre-hospital Living Situation: Home, With spouse  Patient Pre-hospital Level of Mobility: walks  Patient Pre-hospital Diet Restrictions: None    Meds/Allergies   I have reviewed home medications with patient personally.  Prior to Admission medications    Medication Sig Start Date End Date Taking? Authorizing Provider   ibuprofen (MOTRIN) 200 mg tablet Take by mouth every 6 (six) hours as needed for mild pain    Historical Provider, MD   ondansetron (ZOFRAN-ODT) 4 mg disintegrating tablet Take 1 tablet (4 mg total) by mouth every 6 (six) hours as needed for nausea or vomiting  Patient not taking: Reported on 10/25/2023 5/8/23   Mesha Cortez PA-C   tiZANidine (ZANAFLEX) 4 mg tablet Take 1 tablet (4 mg total) by mouth daily at bedtime 3/19/25   Elizabeth Rabago PA-C     No Known  Allergies    Objective :  Temp:  [97.6 °F (36.4 °C)-98.4 °F (36.9 °C)] 98.4 °F (36.9 °C)  HR:  [52-68] 53  BP: (124-252)/() 124/91  Resp:  [15-22] 20  SpO2:  [97 %-100 %] 98 %  O2 Device: None (Room air)    Physical Exam  Vitals and nursing note reviewed.   Constitutional:       General: He is not in acute distress.     Appearance: Normal appearance. He is not ill-appearing.      Comments: Pleasant and conversational.  Sitting at the side of the bed.  Appears comfortable.   HENT:      Head: Normocephalic.      Nose: Nose normal.      Mouth/Throat:      Mouth: Mucous membranes are dry.   Eyes:      Conjunctiva/sclera: Conjunctivae normal.   Cardiovascular:      Rate and Rhythm: Regular rhythm. Bradycardia present.      Pulses: Normal pulses.      Heart sounds: No murmur heard.  Pulmonary:      Effort: Pulmonary effort is normal. No respiratory distress.      Breath sounds: Normal breath sounds. No wheezing, rhonchi or rales.   Abdominal:      General: Abdomen is flat.      Palpations: Abdomen is soft.      Tenderness: There is abdominal tenderness (Mild epigastric). There is no guarding or rebound.   Musculoskeletal:         General: Normal range of motion.      Cervical back: Normal range of motion.      Right lower leg: No edema.      Left lower leg: No edema.   Skin:     General: Skin is warm and dry.      Coloration: Skin is not pale.   Neurological:      General: No focal deficit present.      Mental Status: He is alert and oriented to person, place, and time.   Psychiatric:         Mood and Affect: Mood normal.         Thought Content: Thought content normal.          Lines/Drains:            Lab Results: I have reviewed the following results:  Results from last 7 days   Lab Units 04/06/25  0020   WBC Thousand/uL 16.62*   HEMOGLOBIN g/dL 17.8*   HEMATOCRIT % 54.6*   PLATELETS Thousands/uL 234   SEGS PCT % 69   LYMPHO PCT % 19   MONO PCT % 10   EOS PCT % 1     Results from last 7 days   Lab Units  "04/06/25  0020   SODIUM mmol/L 137   POTASSIUM mmol/L 4.1   CHLORIDE mmol/L 101   CO2 mmol/L 28   BUN mg/dL 28*   CREATININE mg/dL 1.21   ANION GAP mmol/L 8   CALCIUM mg/dL 9.2   ALBUMIN g/dL 4.5   TOTAL BILIRUBIN mg/dL 0.51   ALK PHOS U/L 87   ALT U/L 78*   AST U/L 45*   GLUCOSE RANDOM mg/dL 121             No results found for: \"HGBA1C\"  Results from last 7 days   Lab Units 04/06/25  0124   LACTIC ACID mmol/L 1.2       Imaging Results Review: I reviewed radiology reports from this admission including: CT abdomen/pelvis.  Other Study Results Review: EKG was personally reviewed and my interpretation is: NSR incomplete RBBB. No acute ischemia. Normal QTc..    Administrative Statements       ** Please Note: This note has been constructed using a voice recognition system. **    "

## 2025-04-06 NOTE — PLAN OF CARE
x  Problem: PAIN - ADULT  Goal: Verbalizes/displays adequate comfort level or baseline comfort level  Description: Interventions:- Encourage patient to monitor pain and request assistance- Assess pain using appropriate pain scale- Administer analgesics based on type and severity of pain and evaluate response- Implement non-pharmacological measures as appropriate and evaluate response- Consider cultural and social influences on pain and pain management- Notify physician/advanced practitioner if interventions unsuccessful or patient reports new pain  Outcome: Progressing     Problem: INFECTION - ADULT  Goal: Absence or prevention of progression during hospitalization  Description: INTERVENTIONS:- Assess and monitor for signs and symptoms of infection- Monitor lab/diagnostic results- Monitor all insertion sites, i.e. indwelling lines, tubes, and drains- Monitor endotracheal if appropriate and nasal secretions for changes in amount and color- [place] appropriate cooling/warming therapies per order- Administer medications as ordered- Instruct and encourage patient and family to use good hand hygiene technique- Identify and instruct in appropriate isolation precautions for identified infection/condition  Outcome: Progressing  Goal: Absence of fever/infection during neutropenic period  Description: INTERVENTIONS:- Monitor WBC  Outcome: Progressing     Problem: SAFETY ADULT  Goal: Patient will remain free of falls  Description: INTERVENTIONS:- Educate patient/family on patient safety including physical limitations- Instruct patient to call for assistance with activity - Consult OT/PT to assist with strengthening/mobility - Keep Call bell within reach- Keep bed low and locked with side rails adjusted as appropriate- Keep care items and personal belongings within reach- Initiate and maintain comfort rounds- Make Fall Risk Sign visible to staff- Offer Toileting every x Hours, in advance of need- Initiate/Maintain xalarm-  Obtain necessary fall risk management equipment: x- Apply yellow socks and bracelet for high fall risk patients- Consider moving patient to room near nurses station  Outcome: Progressing  Goal: Maintain or return to baseline ADL function  Description: INTERVENTIONS:-  Assess patient's ability to carry out ADLs; assess patient's baseline for ADL function and identify physical deficits which impact ability to perform ADLs (bathing, care of mouth/teeth, toileting, grooming, dressing, etc.)- Assess/evaluate cause of self-care deficits - Assess range of motion- Assess patient's mobility; develop plan if impaired- Assess patient's need for assistive devices and provide as appropriate- Encourage maximum independence but intervene and supervise when necessary- Involve family in performance of ADLs- Assess for home care needs following discharge - Consider OT consult to assist with ADL evaluation and planning for discharge- Provide patient education as appropriate  Outcome: Progressing  Goal: Maintains/Returns to pre admission functional level  Description: INTERVENTIONS:- Perform AM-PAC 6 Click Basic Mobility/ Daily Activity assessment daily.- Set and communicate daily mobility goal to care team and patient/family/caregiver. - Collaborate with rehabilitation services on mobility goals if consulted- Perform Range of Motion x times a day.- Reposition patient every x hours.- Dangle patient x times a day- Stand patient x times a day- Ambulate patient xxx times a day- Out of bed to chair x times a day - Out of bed for meals x times a day- Out of bed for toileting- Record patient progress and toleration of activity level   Outcome: Progressing     Problem: DISCHARGE PLANNING  Goal: Discharge to home or other facility with appropriate resources  Description: INTERVENTIONS:- Identify barriers to discharge w/patient and caregiver- Arrange for needed discharge resources and transportation as appropriate- Identify discharge learning  needs (meds, wound care, etc.)- Arrange for interpretive services to assist at discharge as needed- Refer to Case Management Department for coordinating discharge planning if the patient needs post-hospital services based on physician/advanced practitioner order or complex needs related to functional status, cognitive ability, or social support system  Outcome: Progressing     Problem: Knowledge Deficit  Goal: Patient/family/caregiver demonstrates understanding of disease process, treatment plan, medications, and discharge instructions  Description: Complete learning assessment and assess knowledge base.Interventions:- Provide teaching at level of understanding- Provide teaching via preferred learning methods  Outcome: Progressing     Problem: GASTROINTESTINAL - ADULT  Goal: Minimal or absence of nausea and/or vomiting  Description: INTERVENTIONS:- Administer IV fluids if ordered to ensure adequate hydration- Maintain NPO status until nausea and vomiting are resolved- Nasogastric tube if ordered- Administer ordered antiemetic medications as needed- Provide nonpharmacologic comfort measures as appropriate- Advance diet as tolerated, if ordered- Consider nutrition services referral to assist patient with adequate nutrition and appropriate food choices  Outcome: Progressing  Goal: Maintains or returns to baseline bowel function  Description: INTERVENTIONS:- Assess bowel function- Encourage oral fluids to ensure adequate hydration- Administer IV fluids if ordered to ensure adequate hydration- Administer ordered medications as needed- Encourage mobilization and activity- Consider nutritional services referral to assist patient with adequate nutrition and appropriate food choices  Outcome: Progressing  Goal: Maintains adequate nutritional intake  Description: INTERVENTIONS:- Monitor percentage of each meal consumed- Identify factors contributing to decreased intake, treat as appropriate- Assist with meals as needed-  Monitor I&O, weight, and lab values if indicated- Obtain nutrition services referral as needed  Outcome: Progressing  Goal: Establish and maintain optimal ostomy function  Description: INTERVENTIONS:- Assess bowel function- Encourage oral fluids to ensure adequate hydration- Administer IV fluids if ordered to ensure adequate hydration - Administer ordered medications as needed- Encourage mobilization and activity- Nutrition services referral to assist patient with appropriate food choices- Assess stoma site- Consider wound care consult   Outcome: Progressing  Goal: Oral mucous membranes remain intact  Description: INTERVENTIONS- Assess oral mucosa and hygiene practices- Implement preventative oral hygiene regimen- Implement oral medicated treatments as ordered- Initiate Nutrition services referral as needed  Outcome: Progressing

## 2025-04-06 NOTE — PLAN OF CARE
Problem: PAIN - ADULT  Goal: Verbalizes/displays adequate comfort level or baseline comfort level  Description: Interventions:- Encourage patient to monitor pain and request assistance- Assess pain using appropriate pain scale- Administer analgesics based on type and severity of pain and evaluate response- Implement non-pharmacological measures as appropriate and evaluate response- Consider cultural and social influences on pain and pain management- Notify physician/advanced practitioner if interventions unsuccessful or patient reports new pain  Outcome: Progressing     Problem: INFECTION - ADULT  Goal: Absence of fever/infection during neutropenic period  Description: INTERVENTIONS:- Monitor WBC  Outcome: Progressing     Problem: SAFETY ADULT  Goal: Maintain or return to baseline ADL function  Description: INTERVENTIONS:-  Assess patient's ability to carry out ADLs; assess patient's baseline for ADL function and identify physical deficits which impact ability to perform ADLs (bathing, care of mouth/teeth, toileting, grooming, dressing, etc.)- Assess/evaluate cause of self-care deficits - Assess range of motion- Assess patient's mobility; develop plan if impaired- Assess patient's need for assistive devices and provide as appropriate- Encourage maximum independence but intervene and supervise when necessary- Involve family in performance of ADLs- Assess for home care needs following discharge - Consider OT consult to assist with ADL evaluation and planning for discharge- Provide patient education as appropriate  Outcome: Progressing     Problem: DISCHARGE PLANNING  Goal: Discharge to home or other facility with appropriate resources  Description: INTERVENTIONS:- Identify barriers to discharge w/patient and caregiver- Arrange for needed discharge resources and transportation as appropriate- Identify discharge learning needs (meds, wound care, etc.)- Arrange for interpretive services to assist at discharge as  needed- Refer to Case Management Department for coordinating discharge planning if the patient needs post-hospital services based on physician/advanced practitioner order or complex needs related to functional status, cognitive ability, or social support system  Outcome: Progressing

## 2025-04-06 NOTE — ED PROVIDER NOTES
Time reflects when diagnosis was documented in both MDM as applicable and the Disposition within this note       Time User Action Codes Description Comment    4/6/2025  3:20 AM Mauro Grullon [K52.9] Gastroenteritis     4/6/2025  3:20 AM Mauro Grullon [K57.92] Acute diverticulitis     4/6/2025  3:21 AM Mauro Grullon [I77.4] Celiac artery stenosis (HCC)     4/6/2025  4:21 AM Mauro Grullon [R10.13] Epigastric pain           ED Disposition       ED Disposition   Admit    Condition   Stable    Date/Time   Sun Apr 6, 2025  4:14 AM    Comment   Case was discussed with MADELINE and the patient's admission status was agreed to be Admission Status: inpatient status to the service of Dr. Russ.               Assessment & Plan       Medical Decision Making    54 y.o. male presenting for chest pain.  Blood pressure severely elevated and uncomfortable appearing.  Will obtain labs to evaluate for leukocytosis, anemia, cholestasis, hepatitis, pancreatitis, electrolyte abnormality or MINDY.  Will obtain EKG and troponin to evaluate for arrhythmia or ACS.  Given smoking history and hypertension will pursue CTA to evaluate for aortic aneurysm or dissection.  Differential would include peptic ulcer disease or mesenteric ischemia.  Less likely SBO.    Repeat evaluation: Patient did have improvement of symptoms with IV analgesia.  After using the bathroom he had recurrence of severe upper abdominal discomfort with subsequent hypertension for which additional analgesia was administered and again had symptomatic improvement.  Reviewed lab results with patient in detail.  No evidence of sepsis, pancreatitis or hepatobiliary disease.  CT results reviewed with radiology via phone. No definitive evidence of perforation however concerning for possible peptic ulcer disease.  SMA stenosis noted which also could be contributing to symptoms however given normal lactic do not suspect acute mesenteric ischemia.  Patient was  evaluated by general surgery and vascular surgery who recommended admission to the medicine service for further evaluation.  Will initiate on Zosyn for diverticulitis however do not suspect this is the etiology of his presenting symptoms this evening.  Patient updated regarding these findings/recommendations and agreeable.    Impression: Acute abdominal pain in the setting of celiac artery stenosis and gastritis. Will admit for further evaluation and management.  Patient care discussed with [facility] who will assume care and admit patient to the hospital. I have discussed with the patient our recommendation of inpatient admission for further medical care. I have answered all of the patient's questions and concerns. The patient is in agreement with the plan to proceed with admission to the hospital.     Amount and/or Complexity of Data Reviewed  Labs: ordered.  Radiology: ordered.    Risk  Prescription drug management.  Decision regarding hospitalization.        ED Course as of 04/06/25 0423   Sat Apr 05, 2025   1858 Procedure Note: EKG  Date/Time: 04/05/25 11:57 PM   Interpreted by: Mauro Grullon DO  Indications / Diagnosis: Chest pain  ECG reviewed by me, the ED Provider: yes   The EKG demonstrates:  Rhythm: sinus bradycardia, rate= 57 BPM  Intervals: Normal CA and QT intervals  Axis: Normal axis  QRS/Blocks: incomplete RBBB  ST Changes: No acute ST/T waves changes. No BING. No TWI.   Sun Apr 06, 2025   0238 Patient walked to bathroom and developed recurrent upper abdominal discomfort. Will redose analgesia, awaiting CT and will obtain 2hr troponin.   0255 D/w radiology Dr. Prieto, severe inflammation in the region of stomach. Possibly gastritis, no evidence of perforation. Uncomplicated diverticulitis.   0302 D/w general surgery for evaluation       Medications   fentaNYL injection 50 mcg (50 mcg Intravenous Given 4/6/25 0018)   iohexol (OMNIPAQUE) 350 MG/ML injection (MULTI-DOSE) 100 mL (100 mL Intravenous Given  4/6/25 0036)   morphine injection 8 mg (8 mg Intravenous Given 4/6/25 0058)   pantoprazole (PROTONIX) 80 mg in sodium chloride 0.9 % 100 mL IVPB (0 mg Intravenous Stopped 4/6/25 0234)   sodium chloride 0.9 % bolus 1,000 mL (0 mL Intravenous Stopped 4/6/25 0234)   fentaNYL injection 50 mcg (50 mcg Intravenous Given 4/6/25 0231)   piperacillin-tazobactam (ZOSYN) IVPB 4.5 g (0 g Intravenous Stopped 4/6/25 0351)   morphine injection 8 mg (8 mg Intravenous Given 4/6/25 0306)   fentaNYL injection 50 mcg (50 mcg Intravenous Given 4/6/25 0332)       ED Risk Strat Scores   HEART Risk Score      Flowsheet Row Most Recent Value   Heart Score Risk Calculator    History 1 Filed at: 04/06/2025 0303   ECG 1 Filed at: 04/06/2025 0303   Age 1 Filed at: 04/06/2025 0303   Risk Factors 2 Filed at: 04/06/2025 0303   Troponin 0 Filed at: 04/06/2025 0303   HEART Score 5 Filed at: 04/06/2025 0303          HEART Risk Score      Flowsheet Row Most Recent Value   Heart Score Risk Calculator    History 1 Filed at: 04/06/2025 0303   ECG 1 Filed at: 04/06/2025 0303   Age 1 Filed at: 04/06/2025 0303   Risk Factors 2 Filed at: 04/06/2025 0303   Troponin 0 Filed at: 04/06/2025 0303   HEART Score 5 Filed at: 04/06/2025 0303                              SBIRT 20yo+      Flowsheet Row Most Recent Value   Initial Alcohol Screen: US AUDIT-C     1. How often do you have a drink containing alcohol? 0 Filed at: 04/06/2025 0040   2. How many drinks containing alcohol do you have on a typical day you are drinking?  0 Filed at: 04/06/2025 0040   3a. Male UNDER 65: How often do you have five or more drinks on one occasion? 0 Filed at: 04/06/2025 0040   Audit-C Score 0 Filed at: 04/06/2025 0040   KATHRIN: How many times in the past year have you...    Used an illegal drug or used a prescription medication for non-medical reasons? Never Filed at: 04/06/2025 0040                            History of Present Illness       Chief Complaint   Patient presents with     "Chest Pain     Patient reports chest pain that started yesterday.  He states that it comes in \"waves\" and he has has had episodes of vomiting as well.       Past Medical History:   Diagnosis Date    Arthritis     Chronic pain     Hypertension       Past Surgical History:   Procedure Laterality Date    BACK SURGERY        Family History   Problem Relation Age of Onset    Hypertension Father     Cancer Paternal Grandfather       Social History     Tobacco Use    Smoking status: Every Day     Current packs/day: 2.00     Types: Cigarettes    Smokeless tobacco: Never   Substance Use Topics    Alcohol use: Yes     Comment: social    Drug use: Never      E-Cigarette/Vaping      E-Cigarette/Vaping Substances      I have reviewed and agree with the history as documented.     Luis Powell is a 54 y.o. year old male with PMH of HTN presenting to the [facility] ED for chest/abdominal pain. Patient has had 30 minutes of upper abdominal/chest pain described as a pressure/squeezing sensation.  He has had similar discomfort intermittently for the past 2 days which he describes comes in \"waves\".  He has had nausea and vomiting due to the pain.  No fevers or chills.  He denies cough or shortness of breath. Denies weakness or numbness of extremities.  No history of prior abdominal surgeries.  He does admit to consuming alcohol nightly.  He also has an extensive cigarette smoking history.      History provided by:  Medical records and patient   used: No    Chest Pain  Associated symptoms: abdominal pain, nausea and vomiting    Associated symptoms: no back pain, no cough, no fever, no numbness, no shortness of breath and no weakness        Review of Systems   Constitutional:  Negative for chills and fever.   Respiratory:  Negative for cough and shortness of breath.    Cardiovascular:  Positive for chest pain. Negative for leg swelling.   Gastrointestinal:  Positive for abdominal pain, nausea and vomiting. "   Genitourinary:  Negative for flank pain.   Musculoskeletal:  Negative for back pain.   Neurological:  Negative for weakness and numbness.   All other systems reviewed and are negative.          Objective       ED Triage Vitals   Temperature Pulse Blood Pressure Respirations SpO2 Patient Position - Orthostatic VS   04/05/25 2352 04/05/25 2359 04/05/25 2352 04/05/25 2352 04/05/25 2352 04/05/25 2352   97.6 °F (36.4 °C) 59 (!) 252/119 16 100 % Sitting      Temp Source Heart Rate Source BP Location FiO2 (%) Pain Score    04/05/25 2352 04/05/25 2352 04/05/25 2352 -- 04/06/25 0018    Temporal Monitor Right arm  7      Vitals      Date and Time Temp Pulse SpO2 Resp BP Pain Score FACES Pain Rating User   04/06/25 0415 -- 68 97 % 22 -- -- --    04/06/25 0402 -- -- -- -- -- 4 --    04/06/25 0400 -- 53 98 % 19 -- -- --    04/06/25 0345 -- 56 98 % 22 151/97 -- --    04/06/25 0332 -- -- -- -- -- 8 --    04/06/25 0315 -- 52 98 % 16 175/74 -- --    04/06/25 0306 -- -- -- -- -- 9 --    04/06/25 0231 -- -- -- -- -- 10 - Worst Possible Pain --    04/06/25 0200 -- 52 97 % 20 142/70 -- --    04/06/25 0130 -- 56 97 % 15 142/70 -- --    04/06/25 0058 -- -- -- -- -- 6 --    04/06/25 0018 -- -- -- -- -- 7 --    04/06/25 0015 -- 62 98 % 18 -- -- --    04/06/25 0000 -- 55 100 % 18 235/104 -- --    04/05/25 2359 -- 59 -- -- -- -- -- NMB   04/05/25 2352 97.6 °F (36.4 °C) -- 100 % 16 252/119 -- -- NMB            Physical Exam  Vitals and nursing note reviewed.   Constitutional:       General: He is in acute distress.      Appearance: He is well-developed. He is ill-appearing (Appears uncomfortable).   HENT:      Head: Normocephalic and atraumatic.      Nose: No congestion or rhinorrhea.   Eyes:      General:         Right eye: No discharge.         Left eye: No discharge.   Cardiovascular:      Rate and Rhythm: Normal rate and regular rhythm.   Pulmonary:      Effort: Pulmonary effort is normal. No respiratory  distress.      Breath sounds: Normal breath sounds. No wheezing or rales.   Abdominal:      General: There is no distension.      Palpations: Abdomen is soft.      Tenderness: There is abdominal tenderness in the epigastric area. There is no right CVA tenderness, left CVA tenderness, guarding or rebound.   Musculoskeletal:      Cervical back: Normal range of motion. No rigidity.      Right lower leg: No edema.      Left lower leg: No edema.   Skin:     General: Skin is warm.      Capillary Refill: Capillary refill takes less than 2 seconds.   Neurological:      Mental Status: He is alert and oriented to person, place, and time.      GCS: GCS eye subscore is 4. GCS verbal subscore is 5. GCS motor subscore is 6.      Cranial Nerves: No dysarthria or facial asymmetry.      Sensory: Sensation is intact.      Motor: Motor function is intact.   Psychiatric:         Mood and Affect: Mood normal.         Behavior: Behavior normal.         Results Reviewed       Procedure Component Value Units Date/Time    HS Troponin I 2hr [[id number]]  (Normal) Collected: 04/06/25 0218    Lab Status: Final result Specimen: Blood from Arm, Right Updated: 04/06/25 0253     hs TnI 2hr 6 ng/L      Delta 2hr hsTnI 0 ng/L     Lactic acid, plasma (w/reflex if result > 2.0) [[id number]]  (Normal) Collected: 04/06/25 0124    Lab Status: Final result Specimen: Blood from Arm, Right Updated: 04/06/25 0145     LACTIC ACID 1.2 mmol/L     Narrative:      Result may be elevated if tourniquet was used during collection.    HS Troponin 0hr (reflex protocol) [[id number]]  (Normal) Collected: 04/06/25 0020    Lab Status: Final result Specimen: Blood from Arm, Right Updated: 04/06/25 0052     hs TnI 0hr 6 ng/L     Comprehensive metabolic panel [[id number]]  (Abnormal) Collected: 04/06/25 0020    Lab Status: Final result Specimen: Blood from Arm, Right Updated: 04/06/25 0046     Sodium 137 mmol/L      Potassium 4.1 mmol/L      Chloride 101 mmol/L      CO2 28  mmol/L      ANION GAP 8 mmol/L      BUN 28 mg/dL      Creatinine 1.21 mg/dL      Glucose 121 mg/dL      Calcium 9.2 mg/dL      AST 45 U/L      ALT 78 U/L      Alkaline Phosphatase 87 U/L      Total Protein 7.5 g/dL      Albumin 4.5 g/dL      Total Bilirubin 0.51 mg/dL      eGFR 67 ml/min/1.73sq m     Narrative:      National Kid[employer] Foundation guidelines for Chronic Kidney Disease (CKD):     Stage 1 with normal or high GFR (GFR > 90 mL/min/1.73 square meters)    Stage 2 Mild CKD (GFR = 60-89 mL/min/1.73 square meters)    Stage 3A Moderate CKD (GFR = 45-59 mL/min/1.73 square meters)    Stage 3B Moderate CKD (GFR = 30-44 mL/min/1.73 square meters)    Stage 4 Severe CKD (GFR = 15-29 mL/min/1.73 square meters)    Stage 5 End Stage CKD (GFR <15 mL/min/1.73 square meters)  Note: GFR calculation is accurate only with a steady state creatinine    Lipase [[id number]]  (Normal) Collected: 04/06/25 0020    Lab Status: Final result Specimen: Blood from Arm, Right Updated: 04/06/25 0046     Lipase 44 u/L     CBC and differential [[id number]]  (Abnormal) Collected: 04/06/25 0020    Lab Status: Final result Specimen: Blood from Arm, Right Updated: 04/06/25 0026     WBC 16.62 Thousand/uL      RBC 6.37 Million/uL      Hemoglobin 17.8 g/dL      Hematocrit 54.6 %      MCV 86 fL      MCH 27.9 pg      MCHC 32.6 g/dL      RDW 14.6 %      MPV 12.0 fL      Platelets 234 Thousands/uL      nRBC 0 /100 WBCs      Segmented % 69 %      Immature Grans % 1 %      Lymphocytes % 19 %      Monocytes % 10 %      Eosinophils Relative 1 %      Basophils Relative 0 %      Absolute Neutrophils 11.47 Thousands/µL      Absolute Immature Grans 0.15 Thousand/uL      Absolute Lymphocytes 3.20 Thousands/µL      Absolute Monocytes 1.66 Thousand/µL      Eosinophils Absolute 0.10 Thousand/µL      Basophils Absolute 0.04 Thousands/µL             CTA dissection protocol chest/abdomen/pelvis   Final Interpretation by Elizabeth Prieto MD (04/06 0256)       There is significant inflammatory change within the mesentery of the upper abdomen in the region of the distal stomach, first and second portion of the duodenum, and a short segment of jejunum. Although this is nonspecific it may possibly be related to    ulcer disease. Surgical consultation and Gastroenterology consultation is recommended.      Mild acute sigmoid diverticulitis.      There is either high-grade stenosis versus occlusion of the proximal celiac axis spanning a length of approximately 12 mm. The distal celiac axis and its branches are reconstituted via collaterals. 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    There is anastomosis between the ANNA and SMA (arc of Riolan variant).      No aortic dissection or intramural hematoma. No aortic aneurysm.      Atherosclerosis. Coronary artery disease.      Other nonemergent and chronic findings as above.               I personally discussed this study with PITA RODRIGUEZ on 4/6/2025 2:43 AM.            Workstation performed: [id number]             Procedures    ED Medication and Procedure Management   Prior to Admission Medications   Prescriptions Last Dose Informant Patient Reported? Taking?   ibuprofen (MOTRIN) 200 mg tablet  Self Yes No   Sig: Take by mouth every 6 (six) hours as needed for mild pain   ondansetron (ZOFRAN-ODT) 4 mg disintegrating tablet  Self No No   Sig: Take 1 tablet (4 mg total) by mouth every 6 (six) hours as needed for nausea or vomiting   Patient not taking: Reported on 10/25/2023   tiZANidine (ZANAFLEX) 4 mg tablet   No No   Sig: Take 1 tablet (4 mg total) by mouth daily at bedtime      Facility-Administered Medications: None     Patient's Medications   Discharge Prescriptions    No medications on file     No discharge procedures on file.  ED SEPSIS DOCUMENTATION   Time reflects when diagnosis was documented in both MDM as applicable and the Disposition within this note       Time User Action Codes Description  Comment    4/6/2025  3:20 AM Mauro Grullon [K52.9] Gastroenteritis     4/6/2025  3:20 AM Mauro Grullon [K57.92] Acute diverticulitis     4/6/2025  3:21 AM Mauro Grullon [I77.4] Celiac artery stenosis (HCC)     4/6/2025  4:21 AM Mauro Grullon [R10.13] Epigastric pain                  Mauro Grullon, DO  04/06/25 0524

## 2025-04-06 NOTE — ASSESSMENT & PLAN NOTE
"Episodic epigastric AP that onset 4/4 at 0100 with associated nausea and vomiting  CT A/P: \"There is significant inflammatory change within the mesentery of the upper abdomen in the region of the distal stomach, first and second portion of the duodenum, and a short segment of jejunum. Although this is nonspecific it may possibly be related to ulcer disease.\"  Is at risk for PUD with extensive smoking history   Reports rare NSAID use   evaluated by surgery in the ED-high suspicion for PUD, recommends:  PPI twice daily  Carafate 4 times daily  GI consult  N.p.o. sips with meds for now  GI consulted for possible EGD  "

## 2025-04-06 NOTE — ED NOTES
PT states pain improved after last dose of medication. Pt awake, alert in no apparent distress, resp even and unlabored. Wife at bedside, pt denies any needs at this time.       Frantz Dean RN  04/06/25 4452

## 2025-04-06 NOTE — ASSESSMENT & PLAN NOTE
Increased 16.62 from 13 today.  Continue IV antibiotics  Continue to trend  Jarrett afebrile, vital signs stable

## 2025-04-06 NOTE — ASSESSMENT & PLAN NOTE
54-year-old male presents with intermittent severe epigastric/substernal chest pain and vomiting since Friday.  Labs remarkable for leukocytosis and elevated transaminases.  CTA chest abdomen pelvis showed inflammation of the distal stomach, small bowel, acute uncomplicated sigmoid diverticulitis, celiac artery stenosis with collaterals, 50% SMA stenosis.  Evaluated by surgery recommend PPI, Carafate GI eval.  DD includes infectious gastroenteritis vs PUD, perforation? in setting of tobacco and alcohol use.    NPO/IVF/antiemetics/pain control  Agree with PPI 40 mg IV BID,  Carafate 1 g QID  RUQ US rule out hepatobiliary disease  To consider EGD to evaluate for above differential diagnosis tomorrow  Counseled on smoking and alcohol cessation

## 2025-04-06 NOTE — ASSESSMENT & PLAN NOTE
CTA: There is significant inflammatory change within the mesentery of the upper abdomen in the region of the distal stomach, first and second portion of the duodenum, and a short segment of jejunum. Although this is nonspecific it may possibly be related to ulcer disease. mild diverticulitis. High grade stenosis vs occlusion of proximal celiac axis for 12 mm. The distal celiac axis and its branches are reconstituted via collaterals. 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     - high suspicion for PUD    - rec [facility] admission, GI consult  - NPO/IVF  - prn pain, antiemetic regimen  - bid PPI  - carafate  - CIWA protocol    Rest of care per primary

## 2025-04-07 ENCOUNTER — TELEPHONE (OUTPATIENT)
Dept: PAIN MEDICINE | Facility: CLINIC | Age: 55
End: 2025-04-07

## 2025-04-07 ENCOUNTER — ANESTHESIA (INPATIENT)
Dept: GASTROENTEROLOGY | Facility: HOSPITAL | Age: 55
DRG: 392 | End: 2025-04-07
Payer: COMMERCIAL

## 2025-04-07 ENCOUNTER — APPOINTMENT (INPATIENT)
Dept: CT IMAGING | Facility: HOSPITAL | Age: 55
DRG: 392 | End: 2025-04-07
Payer: COMMERCIAL

## 2025-04-07 ENCOUNTER — ANESTHESIA EVENT (INPATIENT)
Dept: GASTROENTEROLOGY | Facility: HOSPITAL | Age: 55
DRG: 392 | End: 2025-04-07
Payer: COMMERCIAL

## 2025-04-07 ENCOUNTER — APPOINTMENT (INPATIENT)
Dept: GASTROENTEROLOGY | Facility: HOSPITAL | Age: 55
DRG: 392 | End: 2025-04-07
Attending: INTERNAL MEDICINE
Payer: COMMERCIAL

## 2025-04-07 PROBLEM — R79.89 ELEVATED LFTS: Status: RESOLVED | Noted: 2025-04-06 | Resolved: 2025-04-07

## 2025-04-07 LAB
ALBUMIN SERPL BCG-MCNC: 3.7 G/DL (ref 3.5–5)
ALBUMIN SERPL BCG-MCNC: 4.2 G/DL (ref 3.5–5)
ALP SERPL-CCNC: 54 U/L (ref 34–104)
ALP SERPL-CCNC: 75 U/L (ref 34–104)
ALT SERPL W P-5'-P-CCNC: 36 U/L (ref 7–52)
ALT SERPL W P-5'-P-CCNC: 40 U/L (ref 7–52)
ANION GAP SERPL CALCULATED.3IONS-SCNC: 10 MMOL/L (ref 4–13)
ANION GAP SERPL CALCULATED.3IONS-SCNC: 11 MMOL/L (ref 4–13)
AST SERPL W P-5'-P-CCNC: 13 U/L (ref 13–39)
AST SERPL W P-5'-P-CCNC: 13 U/L (ref 13–39)
BILIRUB DIRECT SERPL-MCNC: 0.13 MG/DL (ref 0–0.2)
BILIRUB SERPL-MCNC: 0.62 MG/DL (ref 0.2–1)
BILIRUB SERPL-MCNC: 1.01 MG/DL (ref 0.2–1)
BUN SERPL-MCNC: 17 MG/DL (ref 5–25)
BUN SERPL-MCNC: 21 MG/DL (ref 5–25)
CALCIUM SERPL-MCNC: 8.2 MG/DL (ref 8.4–10.2)
CALCIUM SERPL-MCNC: 9 MG/DL (ref 8.4–10.2)
CHLORIDE SERPL-SCNC: 104 MMOL/L (ref 96–108)
CHLORIDE SERPL-SCNC: 106 MMOL/L (ref 96–108)
CO2 SERPL-SCNC: 18 MMOL/L (ref 21–32)
CO2 SERPL-SCNC: 22 MMOL/L (ref 21–32)
CREAT SERPL-MCNC: 0.8 MG/DL (ref 0.6–1.3)
CREAT SERPL-MCNC: 0.99 MG/DL (ref 0.6–1.3)
ERYTHROCYTE [DISTWIDTH] IN BLOOD BY AUTOMATED COUNT: 14.2 % (ref 11.6–15.1)
ERYTHROCYTE [DISTWIDTH] IN BLOOD BY AUTOMATED COUNT: 14.3 % (ref 11.6–15.1)
GFR SERPL CREATININE-BSD FRML MDRD: 101 ML/MIN/1.73SQ M
GFR SERPL CREATININE-BSD FRML MDRD: 85 ML/MIN/1.73SQ M
GLUCOSE SERPL-MCNC: 126 MG/DL (ref 65–140)
GLUCOSE SERPL-MCNC: 132 MG/DL (ref 65–140)
HAV IGM SER QL: NORMAL
HBV CORE IGM SER QL: NORMAL
HBV SURFACE AG SER QL: NORMAL
HCT VFR BLD AUTO: 47.5 % (ref 36.5–49.3)
HCT VFR BLD AUTO: 53.5 % (ref 36.5–49.3)
HCV AB SER QL: NORMAL
HGB BLD-MCNC: 15.3 G/DL (ref 12–17)
HGB BLD-MCNC: 17.7 G/DL (ref 12–17)
LACTATE SERPL-SCNC: 0.9 MMOL/L (ref 0.5–2)
LACTATE SERPL-SCNC: 2.1 MMOL/L (ref 0.5–2)
MAGNESIUM SERPL-MCNC: 2.4 MG/DL (ref 1.9–2.7)
MCH RBC QN AUTO: 27.9 PG (ref 26.8–34.3)
MCH RBC QN AUTO: 28.1 PG (ref 26.8–34.3)
MCHC RBC AUTO-ENTMCNC: 32.2 G/DL (ref 31.4–37.4)
MCHC RBC AUTO-ENTMCNC: 33.1 G/DL (ref 31.4–37.4)
MCV RBC AUTO: 85 FL (ref 82–98)
MCV RBC AUTO: 87 FL (ref 82–98)
PHOSPHATE SERPL-MCNC: 2.9 MG/DL (ref 2.7–4.5)
PLATELET # BLD AUTO: 159 THOUSANDS/UL (ref 149–390)
PLATELET # BLD AUTO: 191 THOUSANDS/UL (ref 149–390)
PMV BLD AUTO: 11.4 FL (ref 8.9–12.7)
PMV BLD AUTO: 11.7 FL (ref 8.9–12.7)
POTASSIUM SERPL-SCNC: 4.1 MMOL/L (ref 3.5–5.3)
POTASSIUM SERPL-SCNC: 4.4 MMOL/L (ref 3.5–5.3)
PROT SERPL-MCNC: 6.1 G/DL (ref 6.4–8.4)
PROT SERPL-MCNC: 6.9 G/DL (ref 6.4–8.4)
RBC # BLD AUTO: 5.49 MILLION/UL (ref 3.88–5.62)
RBC # BLD AUTO: 6.3 MILLION/UL (ref 3.88–5.62)
SODIUM SERPL-SCNC: 135 MMOL/L (ref 135–147)
SODIUM SERPL-SCNC: 136 MMOL/L (ref 135–147)
WBC # BLD AUTO: 11.81 THOUSAND/UL (ref 4.31–10.16)
WBC # BLD AUTO: 16.4 THOUSAND/UL (ref 4.31–10.16)

## 2025-04-07 PROCEDURE — 88305 TISSUE EXAM BY PATHOLOGIST: CPT | Performed by: PATHOLOGY

## 2025-04-07 PROCEDURE — 84100 ASSAY OF PHOSPHORUS: CPT | Performed by: INTERNAL MEDICINE

## 2025-04-07 PROCEDURE — NC001 PR NO CHARGE: Performed by: INTERNAL MEDICINE

## 2025-04-07 PROCEDURE — 88342 IMHCHEM/IMCYTCHM 1ST ANTB: CPT | Performed by: PATHOLOGY

## 2025-04-07 PROCEDURE — 80048 BASIC METABOLIC PNL TOTAL CA: CPT | Performed by: INTERNAL MEDICINE

## 2025-04-07 PROCEDURE — 0DB98ZX EXCISION OF DUODENUM, VIA NATURAL OR ARTIFICIAL OPENING ENDOSCOPIC, DIAGNOSTIC: ICD-10-PCS | Performed by: INTERNAL MEDICINE

## 2025-04-07 PROCEDURE — 0DB68ZX EXCISION OF STOMACH, VIA NATURAL OR ARTIFICIAL OPENING ENDOSCOPIC, DIAGNOSTIC: ICD-10-PCS | Performed by: INTERNAL MEDICINE

## 2025-04-07 PROCEDURE — 85027 COMPLETE CBC AUTOMATED: CPT | Performed by: INTERNAL MEDICINE

## 2025-04-07 PROCEDURE — 80076 HEPATIC FUNCTION PANEL: CPT | Performed by: INTERNAL MEDICINE

## 2025-04-07 PROCEDURE — 99232 SBSQ HOSP IP/OBS MODERATE 35: CPT | Performed by: INTERNAL MEDICINE

## 2025-04-07 PROCEDURE — 43239 EGD BIOPSY SINGLE/MULTIPLE: CPT | Performed by: INTERNAL MEDICINE

## 2025-04-07 PROCEDURE — 83605 ASSAY OF LACTIC ACID: CPT

## 2025-04-07 PROCEDURE — 85027 COMPLETE CBC AUTOMATED: CPT

## 2025-04-07 PROCEDURE — 80053 COMPREHEN METABOLIC PANEL: CPT

## 2025-04-07 PROCEDURE — 74177 CT ABD & PELVIS W/CONTRAST: CPT

## 2025-04-07 PROCEDURE — 71260 CT THORAX DX C+: CPT

## 2025-04-07 PROCEDURE — 99233 SBSQ HOSP IP/OBS HIGH 50: CPT | Performed by: SURGERY

## 2025-04-07 PROCEDURE — 80074 ACUTE HEPATITIS PANEL: CPT | Performed by: INTERNAL MEDICINE

## 2025-04-07 PROCEDURE — 83735 ASSAY OF MAGNESIUM: CPT | Performed by: INTERNAL MEDICINE

## 2025-04-07 RX ORDER — LIDOCAINE HYDROCHLORIDE 10 MG/ML
INJECTION, SOLUTION EPIDURAL; INFILTRATION; INTRACAUDAL; PERINEURAL AS NEEDED
Status: DISCONTINUED | OUTPATIENT
Start: 2025-04-07 | End: 2025-04-07

## 2025-04-07 RX ORDER — FAMOTIDINE 20 MG/1
40 TABLET, FILM COATED ORAL
Status: DISCONTINUED | OUTPATIENT
Start: 2025-04-07 | End: 2025-04-09 | Stop reason: HOSPADM

## 2025-04-07 RX ORDER — HYDROMORPHONE HCL/PF 1 MG/ML
0.5 SYRINGE (ML) INJECTION EVERY 6 HOURS PRN
Status: DISCONTINUED | OUTPATIENT
Start: 2025-04-07 | End: 2025-04-09 | Stop reason: HOSPADM

## 2025-04-07 RX ORDER — OXYCODONE HYDROCHLORIDE 5 MG/1
5 TABLET ORAL EVERY 6 HOURS PRN
Refills: 0 | Status: DISCONTINUED | OUTPATIENT
Start: 2025-04-07 | End: 2025-04-09 | Stop reason: HOSPADM

## 2025-04-07 RX ORDER — PROPOFOL 10 MG/ML
INJECTION, EMULSION INTRAVENOUS AS NEEDED
Status: DISCONTINUED | OUTPATIENT
Start: 2025-04-07 | End: 2025-04-07

## 2025-04-07 RX ORDER — SODIUM CHLORIDE, SODIUM LACTATE, POTASSIUM CHLORIDE, CALCIUM CHLORIDE 600; 310; 30; 20 MG/100ML; MG/100ML; MG/100ML; MG/100ML
INJECTION, SOLUTION INTRAVENOUS CONTINUOUS PRN
Status: DISCONTINUED | OUTPATIENT
Start: 2025-04-07 | End: 2025-04-07

## 2025-04-07 RX ORDER — SODIUM CHLORIDE, SODIUM LACTATE, POTASSIUM CHLORIDE, CALCIUM CHLORIDE 600; 310; 30; 20 MG/100ML; MG/100ML; MG/100ML; MG/100ML
125 INJECTION, SOLUTION INTRAVENOUS CONTINUOUS
Status: DISCONTINUED | OUTPATIENT
Start: 2025-04-07 | End: 2025-04-08

## 2025-04-07 RX ORDER — FENTANYL CITRATE 50 UG/ML
INJECTION, SOLUTION INTRAMUSCULAR; INTRAVENOUS AS NEEDED
Status: DISCONTINUED | OUTPATIENT
Start: 2025-04-07 | End: 2025-04-07

## 2025-04-07 RX ADMIN — SODIUM CHLORIDE, SODIUM LACTATE, POTASSIUM CHLORIDE, AND CALCIUM CHLORIDE 125 ML/HR: .6; .31; .03; .02 INJECTION, SOLUTION INTRAVENOUS at 17:30

## 2025-04-07 RX ADMIN — FENTANYL CITRATE 50 MCG: 50 INJECTION, SOLUTION INTRAMUSCULAR; INTRAVENOUS at 08:50

## 2025-04-07 RX ADMIN — SODIUM CHLORIDE, SODIUM LACTATE, POTASSIUM CHLORIDE, AND CALCIUM CHLORIDE: .6; .31; .03; .02 INJECTION, SOLUTION INTRAVENOUS at 08:46

## 2025-04-07 RX ADMIN — HYDROMORPHONE HYDROCHLORIDE 0.5 MG: 1 INJECTION, SOLUTION INTRAMUSCULAR; INTRAVENOUS; SUBCUTANEOUS at 02:04

## 2025-04-07 RX ADMIN — IOHEXOL 50 ML: 240 INJECTION, SOLUTION INTRATHECAL; INTRAVASCULAR; INTRAVENOUS; ORAL at 20:52

## 2025-04-07 RX ADMIN — PANTOPRAZOLE SODIUM 40 MG: 40 INJECTION, POWDER, FOR SOLUTION INTRAVENOUS at 09:45

## 2025-04-07 RX ADMIN — PROPOFOL 150 MG: 10 INJECTION, EMULSION INTRAVENOUS at 08:47

## 2025-04-07 RX ADMIN — PIPERACILLIN AND TAZOBACTAM 4.5 G: 4; .5 INJECTION, POWDER, FOR SOLUTION INTRAVENOUS at 17:22

## 2025-04-07 RX ADMIN — HYDROMORPHONE HYDROCHLORIDE 0.5 MG: 1 INJECTION, SOLUTION INTRAMUSCULAR; INTRAVENOUS; SUBCUTANEOUS at 10:50

## 2025-04-07 RX ADMIN — SUCRALFATE 1 G: 1 TABLET ORAL at 17:22

## 2025-04-07 RX ADMIN — OXYCODONE HYDROCHLORIDE 5 MG: 5 TABLET ORAL at 21:26

## 2025-04-07 RX ADMIN — SUCRALFATE 1 G: 1 TABLET ORAL at 13:41

## 2025-04-07 RX ADMIN — FAMOTIDINE 40 MG: 20 TABLET, FILM COATED ORAL at 21:25

## 2025-04-07 RX ADMIN — PANTOPRAZOLE SODIUM 40 MG: 40 INJECTION, POWDER, FOR SOLUTION INTRAVENOUS at 21:26

## 2025-04-07 RX ADMIN — PIPERACILLIN AND TAZOBACTAM 4.5 G: 4; .5 INJECTION, POWDER, FOR SOLUTION INTRAVENOUS at 00:44

## 2025-04-07 RX ADMIN — FENTANYL CITRATE 50 MCG: 50 INJECTION, SOLUTION INTRAMUSCULAR; INTRAVENOUS at 08:47

## 2025-04-07 RX ADMIN — PIPERACILLIN AND TAZOBACTAM 4.5 G: 4; .5 INJECTION, POWDER, FOR SOLUTION INTRAVENOUS at 09:40

## 2025-04-07 RX ADMIN — SUCRALFATE 1 G: 1 TABLET ORAL at 21:25

## 2025-04-07 RX ADMIN — HYDROMORPHONE HYDROCHLORIDE 0.5 MG: 1 INJECTION, SOLUTION INTRAMUSCULAR; INTRAVENOUS; SUBCUTANEOUS at 01:52

## 2025-04-07 RX ADMIN — SODIUM CHLORIDE, SODIUM LACTATE, POTASSIUM CHLORIDE, AND CALCIUM CHLORIDE 500 ML: .6; .31; .03; .02 INJECTION, SOLUTION INTRAVENOUS at 21:25

## 2025-04-07 RX ADMIN — HYDROMORPHONE HYDROCHLORIDE 0.5 MG: 1 INJECTION, SOLUTION INTRAMUSCULAR; INTRAVENOUS; SUBCUTANEOUS at 17:31

## 2025-04-07 RX ADMIN — NICOTINE 1 PATCH: 21 PATCH, EXTENDED RELEASE TRANSDERMAL at 09:38

## 2025-04-07 RX ADMIN — OXYCODONE HYDROCHLORIDE 5 MG: 5 TABLET ORAL at 13:38

## 2025-04-07 RX ADMIN — IOHEXOL 100 ML: 350 INJECTION, SOLUTION INTRAVENOUS at 20:52

## 2025-04-07 RX ADMIN — HYDROMORPHONE HYDROCHLORIDE 0.5 MG: 1 INJECTION, SOLUTION INTRAMUSCULAR; INTRAVENOUS; SUBCUTANEOUS at 06:23

## 2025-04-07 RX ADMIN — LIDOCAINE HYDROCHLORIDE 100 MG: 10 INJECTION, SOLUTION EPIDURAL; INFILTRATION; INTRACAUDAL; PERINEURAL at 08:47

## 2025-04-07 RX ADMIN — SODIUM CHLORIDE, SODIUM LACTATE, POTASSIUM CHLORIDE, AND CALCIUM CHLORIDE 125 ML/HR: .6; .31; .03; .02 INJECTION, SOLUTION INTRAVENOUS at 09:39

## 2025-04-07 NOTE — ASSESSMENT & PLAN NOTE
Recent Labs     04/06/25  0020 04/07/25  0321   AST 45* 13   ALT 78* 36   ALKPHOS 87 54   TBILI 0.51 1.01*   BILIDIR  --  0.13

## 2025-04-07 NOTE — ASSESSMENT & PLAN NOTE
Stenosis seen on imaging in 2023.  Unlikely to be source of pain. Mild sma stenosis as well   Plan for outpatient follow-up

## 2025-04-07 NOTE — ANESTHESIA POSTPROCEDURE EVALUATION
Post-Op Assessment Note    CV Status:  Stable    Pain management: adequate       Mental Status:  Alert and awake   Hydration Status:  Euvolemic   PONV Controlled:  Controlled   Airway Patency:  Patent     Post Op Vitals Reviewed: Yes              Last Filed PACU Vitals:  Vitals Value Taken Time   Temp     Pulse 61 04/07/25 0912   /65 04/07/25 0912   Resp 24 04/07/25 0912   SpO2 96 % 04/07/25 0912       Modified Anuj:     Vitals Value Taken Time   Activity 2 04/07/25 0900   Respiration 2 04/07/25 0900   Circulation 2 04/07/25 0900   Consciousness 2 04/07/25 0900   Oxygen Saturation 2 04/07/25 0900     Modified Anuj Score: 10

## 2025-04-07 NOTE — PROGRESS NOTES
Progress Note - Surgery-General   Name: Luis Powell 54 y.o. male I MRN: [id number]  Unit/Bed#: -01 I Date of Admission: 4/5/2025   Date of Service: 4/7/2025 I Hospital Day: 1    Assessment & Plan  Epigastric pain    CTA: There is significant inflammatory change within the mesentery of the upper abdomen in the region of the distal stomach, first and second portion of the duodenum, and a short segment of jejunum. Although this is nonspecific it may possibly be related to ulcer disease. mild diverticulitis. High grade stenosis vs occlusion of proximal celiac axis for 12 mm. The distal celiac axis and its branches are reconstituted via collaterals. 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     EGD shows mild nonerosive esophagitis, mod erosive gastritis, mild-mod duodenitis of the bulb  Pepcid added along with PPI, carafate  Continue to monitor symptoms, if not improving, consider repeat Ct with oral contrast on Thursday  Sigmoid diverticulitis  Uncomplicated, as evidenced by CT scan  On [facility] per primary team    Celiac artery stenosis (HCC)  Stenosis seen on imaging in 2023.  Unlikely to be source of pain. Mild sma stenosis as well   Plan for outpatient follow-up  Alcohol abuse  CIWA protocol    Leukocytosis  Increased 16.62 from 13 today.  Continue IV antibiotics  Continue to trend  Jarrett afebrile, vital signs stable    Elevated LFTs  Plan for liver ultrasound per GI team  Hypertension          Subjective   Lying in bed  Still with epigastric pain  No N/V    Objective :  Temp:  [97.1 °F (36.2 °C)-98.1 °F (36.7 °C)] 97.5 °F (36.4 °C)  HR:  [46-61] 61  BP: (120-181)/(60-92) 126/65  Resp:  [12-24] 24  SpO2:  [94 %-98 %] 96 %  O2 Device: None (Room air)    I/O         04/05 0701 04/06 0700 04/06 0701 04/07 0700 04/07 0701 04/08 0700    P.O. 30 0     I.V. (mL/kg)  2675 (24.8) 200 (1.9)    IV Piggyback  200     Total Intake(mL/kg) 30 (0.3) 2875 (26.6) 200 (1.9)    Urine (mL/kg/hr) 0 1500 (0.6)      Total Output 0 1500     Net +30 +1375 +200                 Physical Exam Neurological Exam  General appearance: alert and oriented, in no acute distress  Head: Normocephalic, without obvious abnormality, atraumatic, sclerae anicteric, mucous membranes moist  Neck: no JVD and supple, symmetrical, trachea midline  Lungs: clear to auscultation, no wheezes or rales  Heart:   regular rate, regular rhythm, S1-S2 normal, no murmur  Abdomen:  +epigastric pain on exam, ttp, non distended, soft, no guarding, no rebound, active bowel sounds  Extremities:   No edema, redness or tenderness in the calves or thighs  Skin: Warm, dry  Nursing notes and vital signs reviewed      Lab Results: I have reviewed the following results:  Recent Labs     04/06/25  0020 04/06/25  0124 04/06/25  0218 04/07/25  0321   WBC 16.62*  --   --  11.81*   HGB 17.8*  --   --  15.3   HCT 54.6*  --   --  47.5     --   --  159   SODIUM 137  --   --  135   K 4.1  --   --  4.4     --   --  106   CO2 28  --   --  18*   BUN 28*  --   --  17   CREATININE 1.21  --   --  0.80   GLUC 121  --   --  126   MG  --   --   --  2.4   PHOS  --   --   --  2.9   AST 45*  --   --  13   ALT 78*  --   --  36   ALB 4.5  --   --  3.7   TBILI 0.51  --   --  1.01*   ALKPHOS 87  --   --  54   HSTNI0 6  --   --   --    HSTNI2  --   --  6  --    LACTICACID  --  1.2  --   --          VTE Pharmacologic Prophylaxis: VTE covered by:  [Held by provider] enoxaparin, Subcutaneous

## 2025-04-07 NOTE — CASE MANAGEMENT
Case Management Assessment & Discharge Planning Note    Patient name Luis Powell  Location /-01 MRN [id number]  : 1970 Date 2025       Current Admission Date: 2025  Current Admission Diagnosis:Epigastric pain   Patient Active Problem List    Diagnosis Date Noted Date Diagnosed    Hypertension      Epigastric pain 2025     Celiac artery stenosis (HCC) 2025     Sigmoid diverticulitis 2025     Alcohol abuse 2025     Leukocytosis 2025     Intervertebral disc disorders with radiculopathy, lumbosacral region 2023     Mixed erectile dysfunction 2021     Splitting of urinary stream 2021     Left hip pain      Chronic pain syndrome 2019     Primary osteoarthritis of left hip 2019     Lumbar spondylosis      Sacroiliitis (HCC)      Chronic left-sided low back pain with left-sided sciatica 2019     S/P lumbar fusion 2019     Diverticulitis of large intestine with perforation and abscess without bleeding 2019       LOS (days): 1  Geometric Mean LOS (GMLOS) (days):   Days to GMLOS:     OBJECTIVE:    Risk of Unplanned Readmission Score: 10.67         Current admission status: Inpatient       Preferred Pharmacy:   [facility] - [address]  [address]  [place] [address]  Phone: [phone] Fax: [phone]    Primary Care Provider: Adamaris Grace MD    Primary Insurance: [facility]  [facility]    ASSESSMENT:  Active Health Care Proxies       Emi Powell Health Care Agent - Spouse   Primary Phone: [phone] (Home)                 Advance Directives  Does patient have a Health Care POA?: No  Was patient offered paperwork?: Yes  Does patient currently have a Health Care decision maker?: Yes, please see Health Care Proxy section  Does patient have Advance Directives?: No  Was patient offered paperwork?:  Yes  Primary Contact: Emi, spouse.         Readmission Root Cause  30 Day Readmission: No    Patient Information  Admitted from:: Home  Mental Status: Alert  During Assessment patient was accompanied by: Not accompanied during assessment  Assessment information provided by:: Patient  Primary Caregiver: Self  Support Systems: Spouse/significant other, Self  County of Residence: [place]  What [facility] do you live in?: [place].  Home entry access options. Select all that apply.: Stairs  Number of steps to enter home.: 5  Do the steps have railings?: Yes  Type of Current Residence: 2 story home  Upon entering residence, is there a bedroom on the main floor (no further steps)?: No  A bedroom is located on the following floor levels of residence (select all that apply):: 2nd Floor  Upon entering residence, is there a bathroom on the main floor (no further steps)?: Yes  Number of steps to 2nd floor from main floor: One Flight  Living Arrangements: Lives w/ Spouse/significant other  Is patient a ?: Yes  Is [facility] with [facility] [place] [facility]: Yes  Is patient service connected?: Yes ([facility]    Activities of Daily Living Prior to Admission  Functional Status: Independent  Completes ADLs independently?: Yes  Ambulates independently?: Yes  Does patient use assisted devices?: No  Does patient currently own DME?: No  Does patient have a history of Outpatient Therapy (PT/OT)?: Yes  Does the patient have a history of Short-Term Rehab?: No  Does patient have a history of HHC?: No  Does patient currently have HHC?: No         Patient Information Continued  Does patient have prescription coverage?: Yes  Can the patient afford their medications and any related supplies (such as glucometers or test strips)?: Yes  Does patient receive dialysis treatments?: No  Does patient have a history of substance abuse?: No  Does patient have a history of Mental Health Diagnosis?: No         Means of Transportation  Means  of Transport to Appts:: Drives Self          DISCHARGE DETAILS:    Discharge planning discussed with:: Pt  Freedom of Choice: Yes     CM contacted family/caregiver?: No- see comments (In contact with family)  Were Treatment Team discharge recommendations reviewed with patient/caregiver?: Yes  Did patient/caregiver verbalize understanding of patient care needs?: Yes  Were patient/caregiver advised of the risks associated with not following Treatment Team discharge recommendations?: Yes                   Other Referral/Resources/Interventions Provided:  Interventions: Other (Specify)  Referral Comments: Spoke with [facility] Angie. Per Angie, new meds prescribed in hospital would need to be faxed to [facility]--[phone]. [facility] pharmacy typically delivers med in 7-10days. If noted to be an expedited med, the VA could potentially send it out next day. Spoke with pt about his alcohol use. Stated that he drinks alcohol to help him sleep. Declined [facility] referral.                                                      Additional Comments: CM met with patient at bedside to discern discharge needs. Pt lives with spouse in a 2sh, 5STE, bedroom upstairs. Report being independent PTA. Drives. No DME. No hx of STR, HHC, Inpatient psych or D&A treatment. Hx of OPPT. Pt is a 90% service connected , established at [facility]. No medicla POA. Declined [facility]. spouse will provide transport home.

## 2025-04-07 NOTE — ASSESSMENT & PLAN NOTE
"CT A/P: \"Mild acute sigmoid diverticulitis. \"  History of sigmoid diverticulitis with microperforation and abscess in 2019-treated conservatively with IVF and bowel rest  Continue IV Zosyn  "

## 2025-04-07 NOTE — ASSESSMENT & PLAN NOTE
"Episodic epigastric AP that onset 4/4 at 0100 with associated nausea and vomiting  CT A/P: \"There is significant inflammatory change within the mesentery of the upper abdomen in the region of the distal stomach, first and second portion of the duodenum, and a short segment of jejunum. Although this is nonspecific it may possibly be related to ulcer disease.\"  Patient had EGD that showed mild nonerosive esophagitis, moderate erosive gastritis without bleeding biopsied for H. pylori, mild to moderate duodenitis of the bulb  Cussed with gastroenterology, continue PPI twice daily, Carafate before meals, Pepcid at bedtime.  If intermittent abdominal pain persists in the next 24 hours, surgery recommended to repeat CT with IV and oral contrast.  "

## 2025-04-07 NOTE — UTILIZATION REVIEW
"Initial Clinical Review    Admission: Date/Time/Statement:  care started in ED on 4/5/25 and at time of IP order has crossed one midnight.   Admission Orders (From admission, onward)       Ordered        04/06/25 0422  INPATIENT ADMISSION  Once                          Orders Placed This Encounter   Procedures    INPATIENT ADMISSION     Standing Status:   Standing     Number of Occurrences:   1     Level of Care:   Med Surg [16]     Estimated length of stay:   More than 2 Midnights     Certification:   I certify that inpatient services are medically necessary for this patient for a duration of greater than two midnights. See H&P and MD Progress Notes for additional information about the patient's course of treatment.     ED Arrival Information       Expected   -    Arrival   4/5/2025 23:45    Acuity   Urgent              Means of arrival   Walk-In    Escorted by   Spouse    Service   Hospitalist    Admission type   Emergency              Arrival complaint   CHEST PAIN             Chief Complaint   Patient presents with    Chest Pain     Patient reports chest pain that started yesterday.  He states that it comes in \"waves\" and he has has had episodes of vomiting as well.       Initial Presentation: 54 y.o. male  to ED via walk in from home.    Admitted to inpatient with Dx: .Gastritis/Elevated transaminases/Sigmoid diverticulitis/Leukocytosis/Elevated BUN.    Presented to ED with chest and abdominal pain starting intermittently 2 days ago,  last episode lasted 30 minutes.  + nausea and vomiting.   Drink 7-8 beers every other day, smokes 1.5 ppd.   PMHx: chronic low back pain and diverticulitis.   On exam:  acute distress.  Abdominal tenderness in epigastric area.  Wbc 16.62.  bun 28. Creatinine 1.21.   Imaging shows possible peptic ulcer disease.  SMA stenosis. Diverticulitis.   ED treatment: total of 6 doses of IV analgesia,  given IVF bolus, started on Zosyn.  Some improvement with IV analgesia, then used bathroom " "and pain re occurred.     Plan includes to continue IV PPI, Add Carafate.  Pain control.  Consult GI, surgery and vascular.   .     Anticipated Length of Stay/Certification Statement: Patient will be admitted on an inpatient basis with an anticipated length of stay of greater than 2 midnights secondary to epigastric pain with concern for PUD, sigmoid diverticulitis, leukocytosis, alcohol abuse.     Date: 4/6/25   Day 2:  back pain.    On exam:  obese.  Substernal and Epigastric tenderness.   AST 45.  ALT 78.  Continue IV PPI, Carafate. Pain control and using IV Dilaudid.   Continue Zosyn and IVF.  CIWA    4/6/25 per Surgery - acute onset of abdominal pain and suspect Peptic Ulcer disease with ct showing mesenteric inflammatory changes around the distal stomach and proximal duodenum.  No surgical intervention at this time.   Recommend PPI, Carafate.      4/6/25 per Vascular \"CT imaging which demonstrates a likely chronic occlusion of the celiac artery notable for sharp angulation at the clint of the diaphragm. He does have stenosis, the less than 50% of the SMA. At this time I do not believe he is suffering from the sequela of mesenteric ischemia. His CT also notes inflammatory changes in the mesentery near the stomach consistent with inflammation of a duodenal ulcer. \"  Recommend OP vascular follow up.  Asa and statin.     4/6/25 per GI - gastritis/Duodenitis/Elevated transaminases/Diverticulitis.  Plan is IV PPI, Carafate.  Egd tomorrow.  Trend LFTs and check Liver US.  IV antibiotics for diverticulitis.      Patient has crossed 3 midnights and requires ongoing care    4/7/2025 .  Patient presents with pain of upper abdomen   On exam: abdomen soft. Hyperactive bowel sounds.    Abnormal labs or imaging:  total Bili 1.01.  wbc 11.81.    Diagnosis/Plan    epigastric pain/Sigmoid Diverticulitis/Celiac Artery Stenosis/Alcohol abuse.   IV PPI, Zosyn.  Continue IVF.  CIWA     Procedure 4/7/25: egd Mild nonerosive " esophagitis  Moderate erosive gastritis without bleeding biopsied for H. Pylori  Mild to moderate duodenitis of the bulb, biopsies obtained  Recommend: Resume diet  Await biopsies  Continue twice daily PPI, Carafate before meals.  I added Pepcid at bedtime  If intermittent abdominal pain persists despite treatment of gastritis and duodenitis, will discuss HIDA with surgical service    ED Treatment-Medication Administration from 04/05/2025 2345 to 04/06/2025 0513         Date/Time Order Dose Route Action     04/06/2025 0018 fentaNYL injection 50 mcg 50 mcg Intravenous Given     04/06/2025 0058 morphine injection 8 mg 8 mg Intravenous Given     04/06/2025 0122 pantoprazole (PROTONIX) 80 mg in sodium chloride 0.9 % 100 mL IVPB 80 mg Intravenous New Bag     04/06/2025 0122 sodium chloride 0.9 % bolus 1,000 mL 1,000 mL Intravenous New Bag     04/06/2025 0231 fentaNYL injection 50 mcg 50 mcg Intravenous Given     04/06/2025 0306 piperacillin-tazobactam (ZOSYN) IVPB 4.5 g 4.5 g Intravenous New Bag     04/06/2025 0306 morphine injection 8 mg 8 mg Intravenous Given     04/06/2025 0332 fentaNYL injection 50 mcg 50 mcg Intravenous Given     04/06/2025 0457 morphine injection 4 mg 4 mg Intravenous Given          Scheduled Medications:  enoxaparin, 40 mg, Subcutaneous, Daily  famotidine, 40 mg, Oral, HS  nicotine, 1 patch, Transdermal, Daily  pantoprazole, 40 mg, Intravenous, Q12H LINDA  piperacillin-tazobactam, 4.5 g, Intravenous, Q8H  sucralfate, 1 g, Oral, 4x Daily (AC & HS)  tiZANidine, 4 mg, Oral, HS    diphenhydrAMINE (BENADRYL) tablet 50 mg  Dose: 50 mg  Freq: Once Route: PO  Start: 04/06/25 2030 End: 04/06/25 2132    LORazepam (ATIVAN) injection 1 mg  Dose: 1 mg  Freq: Once Route: IV  Start: 04/06/25 1200 End: 04/06/25 1358     Continuous IV Infusions:  lactated ringers, 125 mL/hr, Intravenous, Continuous    multi-electrolyte (Plasmalyte-A/Isolyte-S PH 7.4/Normosol-R) IV solution  Rate: 125 mL/hr Dose: 125 mL/hr  Freq:  Continuous Route: IV  Last Dose: Stopped (04/07/25 0322)  Start: 04/06/25 0530 End: 04/07/25 0224    PRN Meds:  acetaminophen, 650 mg, Oral, Q6H PRN x 1 4/6/25  HYDROmorphone, 0.5 mg, Intravenous, Q6H PRN - x 4 4/6.  X 4 4/7  ondansetron, 4 mg, Intravenous, Q4H PRN  oxyCODONE, 5 mg, Oral, Q6H PRN  oxyCODONE, 2.5 mg, Oral, Q6H PRN      ED Triage Vitals   Temperature Pulse Respirations Blood Pressure SpO2 Pain Score   04/05/25 2352 04/05/25 2359 04/05/25 2352 04/05/25 2352 04/05/25 2352 04/06/25 0018   97.6 °F (36.4 °C) 59 16 (!) 252/119 100 % 7     Weight (last 2 days)       Date/Time Weight    04/06/25 05:17:23 108 (238.2)          Vital Signs (last 3 days)       Date/Time Temp Pulse Resp BP MAP (mmHg) SpO2 O2 Device Patient Position - Orthostatic VS CIWA-Ar Total Pain    04/07/25 1050 -- -- -- -- -- -- -- -- -- 5    04/07/25 0912 -- 61 24 126/65 -- 96 % None (Room air) -- -- No Pain    04/07/25 0859 -- 48 12 130/69 -- 96 % None (Room air) -- -- No Pain    04/07/25 0819 97.5 °F (36.4 °C) 48 20 137/73 -- 97 % None (Room air) -- -- 3    04/07/25 07:39:28 97.6 °F (36.4 °C) 46 -- 121/60 80 95 % -- -- -- --    04/07/25 0623 -- -- -- -- -- -- -- -- -- 6    04/07/25 05:29:51 -- 46 -- 128/66 87 98 % -- -- -- --    04/07/25 02:16:30 97.1 °F (36.2 °C) 49 16 181/92 122 98 % None (Room air) Lying -- --    04/07/25 0204 -- -- -- -- -- -- -- -- -- 10 - Worst Possible Pain    04/07/25 0152 -- -- -- -- -- -- -- -- -- 8    04/07/25 0000 -- -- -- -- -- -- -- -- 0 --    04/06/25 21:32:05 98.1 °F (36.7 °C) 52 16 120/73 89 96 % -- -- -- --    04/06/25 2100 -- -- -- -- -- 94 % None (Room air) -- -- --    04/06/25 2000 -- -- -- -- -- -- -- -- 0 --    04/06/25 1937 -- -- -- -- -- -- -- -- -- 5    04/06/25 1600 -- -- -- 143/82 -- -- -- -- 0 --    04/06/25 15:08:37 97.8 °F (36.6 °C) 51 20 142/83 103 97 % -- -- -- --    04/06/25 1446 -- -- -- -- -- -- -- -- -- 7    04/06/25 1200 -- -- -- -- -- -- -- -- 0 --    04/06/25 1024 -- -- -- -- -- --  -- -- -- 7    04/06/25 0927 -- -- -- -- -- -- -- -- -- 6    04/06/25 0800 -- -- -- 129/82 -- -- None (Room air) -- 0 5    04/06/25 07:04:19 98.4 °F (36.9 °C) 53 20 129/80 96 96 % -- -- -- --    04/06/25 0611 -- -- -- -- -- 96 % None (Room air) -- -- 6 04/06/25 0554 -- -- -- -- -- -- -- -- 0 --    04/06/25 0546 -- -- -- -- -- -- -- -- -- 6 04/06/25 0545 -- -- -- -- -- 98 % None (Room air) -- -- --    04/06/25 05:17:23 98.4 °F (36.9 °C) 53 20 124/91 102 98 % None (Room air) Sitting -- --    04/06/25 0500 -- 57 21 -- -- 98 % -- -- -- --    04/06/25 0457 -- -- -- -- -- -- -- -- -- 7 04/06/25 0445 -- 53 18 -- -- 98 % -- -- -- --    04/06/25 0430 -- 53 22 -- -- 97 % -- -- -- --    04/06/25 0415 -- 68 22 -- -- 97 % -- -- -- --    04/06/25 0402 -- -- -- -- -- -- -- -- -- 4    04/06/25 0400 -- 53 19 -- -- 98 % -- -- -- --    04/06/25 0345 -- 56 22 151/97 112 98 % -- -- -- --    04/06/25 0332 -- -- -- -- -- -- -- -- -- 8    04/06/25 0330 -- 55 21 -- -- 99 % -- -- -- --    04/06/25 0315 -- 52 16 175/74 106 98 % -- -- -- --    04/06/25 0306 -- -- -- -- -- -- -- -- -- 9    04/06/25 0231 -- -- -- -- -- -- -- -- -- 10 - Worst Possible Pain    04/06/25 0200 -- 52 20 142/70 99 97 % -- -- -- --    04/06/25 0130 -- 56 15 142/70 98 97 % -- -- -- --    04/06/25 0058 -- -- -- -- -- -- -- -- -- 6    04/06/25 0018 -- -- -- -- -- -- -- -- -- 7    04/06/25 0015 -- 62 18 -- -- 98 % -- -- -- --    04/06/25 0000 -- 55 18 235/104 149 100 % -- -- -- --    04/05/25 2359 -- 59 -- -- -- -- -- -- -- --    04/05/25 2352 97.6 °F (36.4 °C) -- 16 252/119 -- 100 % None (Room air) Sitting -- --           CIWA-Ar Score       Row Name 04/07/25 0000 04/06/25 2000 04/06/25 1600       CIWA-Ar    BP -- -- 143/82    Nausea and Vomiting 0 0 0    Tactile Disturbances 0 0 0    Tremor 0 0 0    Auditory Disturbances 0 0 0    Paroxysmal Sweats 0 0 0    Visual Disturbances 0 0 0    Anxiety 0 0 0    Headache, Fullness in Head 0 0 0    Agitation 0 0 0     Orientation and Clouding of Sensorium 0 0 0    CIWA-Ar Total 0 0 0      Row Name 04/06/25 1200 04/06/25 0800 04/06/25 0554       CIWA-Ar    BP -- 129/82 --    Nausea and Vomiting 0 0 0    Tactile Disturbances 0 0 0    Tremor 0 0 0    Auditory Disturbances 0 0 0    Paroxysmal Sweats 0 0 0    Visual Disturbances 0 0 0    Anxiety 0 0 0    Headache, Fullness in Head 0 0 0    Agitation 0 0 0    Orientation and Clouding of Sensorium 0 0 0    CIWA-Ar Total 0 0 0                  Pertinent Labs/Diagnostic Test Results:   Radiology:  US right upper quadrant   Final Interpretation by Pita Woods MD (04/06 2532)      Hepatic steatosis.      Otherwise, no significant abnormality.      Workstation performed: [id number]         CTA dissection protocol chest/abdomen/pelvis   Final Interpretation by Elizabeth Prieot MD (04/06 2536)      There is significant inflammatory change within the mesentery of the upper abdomen in the region of the distal stomach, first and second portion of the duodenum, and a short segment of jejunum. Although this is nonspecific it may possibly be related to    ulcer disease. Surgical consultation and Gastroenterology consultation is recommended.      Mild acute sigmoid diverticulitis.      There is either high-grade stenosis versus occlusion of the proximal celiac axis spanning a length of approximately 12 mm. The distal celiac axis and its branches are reconstituted via collaterals. 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    There is anastomosis between the ANNA and SMA (arc of Riolan variant).      No aortic dissection or intramural hematoma. No aortic aneurysm.      Atherosclerosis. Coronary artery disease.      Other nonemergent and chronic findings as above.               I personally discussed this study with PITA RODRIGUEZ on 4/6/2025 2:43 AM.            Workstation performed: [id number]           Cardiology:  ECG 12 lead   Final Result by Martin Bradley MD (04/06 7749)    Normal sinus rhythm   Incomplete right bundle branch block   Borderline ECG   When compared with ECG of 05-Apr-2025 23:57, (unconfirmed)   No significant change was found   Confirmed by Martin Bradley ([id number]) on 4/6/2025 8:56:42 AM      ECG 12 lead   Final Result by Martin Bradley MD (04/06 0856)   Sinus bradycardia   Incomplete right bundle branch block   Borderline ECG   No previous ECGs available   Confirmed by Martin Bradley ([id number]) on 4/6/2025 8:56:36 AM        GI:  EGD   Final Result by Ivan Galvez DO (04/07 0905)   Mild nonerosive esophagitis   Moderate erosive gastritis without bleeding biopsied for H. Pylori   Mild to moderate duodenitis of the bulb, biopsies obtained      RECOMMENDATION:   Resume diet   Await biopsies   Continue twice daily PPI, Carafate before meals.  I added Pepcid at    bedtime   If intermittent abdominal pain persists despite treatment of gastritis and    duodenitis, will discuss HIDA with surgical service                     Ivan Galvez DO         Results from last 7 days   Lab Units 04/07/25 0321 04/06/25  0020   WBC Thousand/uL 11.81* 16.62*   HEMOGLOBIN g/dL 15.3 17.8*   HEMATOCRIT % 47.5 54.6*   PLATELETS Thousands/uL 159 234   TOTAL NEUT ABS Thousands/µL  --  11.47*     Results from last 7 days   Lab Units 04/07/25  0321 04/06/25  0020   SODIUM mmol/L 135 137   POTASSIUM mmol/L 4.4 4.1   CHLORIDE mmol/L 106 101   CO2 mmol/L 18* 28   ANION GAP mmol/L 11 8   BUN mg/dL 17 28*   CREATININE mg/dL 0.80 1.21   EGFR ml/min/1.73sq m 101 67   CALCIUM mg/dL 8.2* 9.2   MAGNESIUM mg/dL 2.4  --    PHOSPHORUS mg/dL 2.9  --      Results from last 7 days   Lab Units 04/07/25  0321 04/06/25  0020   AST U/L 13 45*   ALT U/L 36 78*   ALK PHOS U/L 54 87   TOTAL PROTEIN g/dL 6.1* 7.5   ALBUMIN g/dL 3.7 4.5   TOTAL BILIRUBIN mg/dL 1.01* 0.51   BILIRUBIN DIRECT mg/dL 0.13  --      Results from last 7 days   Lab Units 04/07/25  0321 04/06/25  0020   GLUCOSE RANDOM mg/dL 126 121     Results from last 7  days   Lab Units 04/06/25  0218 04/06/25  0020   HS TNI 0HR ng/L  --  6   HS TNI 2HR ng/L 6  --    HSTNI D2 ng/L 0  --      Results from last 7 days   Lab Units 04/06/25  0124   LACTIC ACID mmol/L 1.2     Results from last 7 days   Lab Units 04/06/25  0020   LIPASE u/L 44     Past Medical History:   Diagnosis Date    Arthritis     Chronic pain     Hypertension      Present on Admission:   Epigastric pain   Celiac artery stenosis (HCC)   Chronic left-sided low back pain with left-sided sciatica      Admitting Diagnosis: Gastroenteritis [K52.9]  Epigastric pain [R10.13]  Celiac artery stenosis (HCC) [I77.4]  Acute diverticulitis [K57.92]  Age/Sex: 54 y.o. male    Network Utilization Review Department  ATTENTION: Please call with any questions or concerns to [phone] and carefully listen to the prompts so that you are directed to the right person. All voicemails are confidential.   For Discharge needs, contact Care Management DC Support Team at [phone] opt. [phone]  Send all requests for admission clinical reviews, approved or denied determinations and any other requests to dedicated fax number below belonging to the campus where the patient is receiving treatment. List of dedicated fax numbers for the Facilities:  FACILITY NAME UR FAX NUMBER   ADMISSION DENIALS (Administrative/Medical Necessity) [phone]   DISCHARGE SUPPORT TEAM (NETWORK) [phone]   PARENT CHILD HEALTH (Maternity/NICU/Pediatrics) [phone]   [facility] [phone]   [facility] [phone]   [facility] [phone]   [facility] [phone]   [facility] [phone]   [facility] [phone]   [facility] [phone]   [facility] [phone]   [facility] [phone]   [facility]  [facility] [phone]   [facility] [phone]   [facility] [phone]

## 2025-04-07 NOTE — ANESTHESIA PREPROCEDURE EVALUATION
Procedure:  EGD    Relevant Problems   ANESTHESIA (within normal limits)      CARDIO   (+) Celiac artery stenosis (HCC)   (+) Hypertension (No meds)      GI/HEPATIC  Acute diverticulitis  NPO for days  No recent nausea/vomiting      MUSCULOSKELETAL   (+) Chronic left-sided low back pain with left-sided sciatica      NEURO/PSYCH   (+) Chronic left-sided low back pain with left-sided sciatica   (+) Chronic pain syndrome      PULMONARY  Smoker   (-) Sleep apnea   (-) URI (upper respiratory infection)      Behavioral Health   (+) Alcohol abuse    BMI 35    Physical Exam    Airway  Comment: Large neck circumference  Mallampati score: II  TM Distance: >3 FB  Neck ROM: full     Dental   No notable dental hx     Cardiovascular      Pulmonary      Other Findings      Lab Results   Component Value Date    WBC 11.81 (H) 04/07/2025    HGB 15.3 04/07/2025     04/07/2025     Lab Results   Component Value Date    SODIUM 135 04/07/2025    K 4.4 04/07/2025    BUN 17 04/07/2025    CREATININE 0.80 04/07/2025    EGFR 101 04/07/2025    GLUCOSE 89 06/01/2014       Anesthesia Plan  ASA Score- 3     Anesthesia Type- IV sedation with anesthesia with ASA Monitors.         Additional Monitors:     Airway Plan:            Plan Factors-Exercise tolerance (METS): >4 METS.    Chart reviewed.   Existing labs reviewed. Patient summary reviewed.    Patient is a current smoker.              Induction- intravenous.    Postoperative Plan-     Perioperative Resuscitation Plan - Level 1 - Full Code.       Informed Consent- Anesthetic plan and risks discussed with patient.  I personally reviewed this patient with the CRNA. Discussed and agreed on the Anesthesia Plan with the CRNA..      NPO Status:  Vitals Value Taken Time   Date of last liquid 04/07/25 04/07/25 0810   Time of last liquid 0200 04/07/25 0810   Date of last solid 04/04/25 04/07/25 0810   Time of last solid 1930 04/07/25 0810

## 2025-04-07 NOTE — TELEPHONE ENCOUNTER
Caller: Luis STEIN  Doctor/office: Dr Jordan   CB#: [phone]    % of improvement: 25  Pain Scale (1-10): 5/6 when sitting

## 2025-04-07 NOTE — OP NOTE
EGD IMPRESSION:  Mild nonerosive esophagitis  Moderate erosive gastritis without bleeding biopsied for H. Pylori  Mild to moderate duodenitis of the bulb, biopsies obtained    RECOMMENDATION:  Resume diet  Await biopsies  Continue twice daily PPI, Carafate before meals.  I added Pepcid at bedtime  If intermittent abdominal pain persists despite treatment of gastritis and duodenitis, will discuss HIDA with surgical service

## 2025-04-07 NOTE — PLAN OF CARE
Problem: PAIN - ADULT  Goal: Verbalizes/displays adequate comfort level or baseline comfort level  Description: Interventions:- Encourage patient to monitor pain and request assistance- Assess pain using appropriate pain scale- Administer analgesics based on type and severity of pain and evaluate response- Implement non-pharmacological measures as appropriate and evaluate response- Consider cultural and social influences on pain and pain management- Notify physician/advanced practitioner if interventions unsuccessful or patient reports new pain  Outcome: Progressing     Problem: INFECTION - ADULT  Goal: Absence or prevention of progression during hospitalization  Description: INTERVENTIONS:- Assess and monitor for signs and symptoms of infection- Monitor lab/diagnostic results- Monitor all insertion sites, i.e. indwelling lines, tubes, and drains- Monitor endotracheal if appropriate and nasal secretions for changes in amount and color- [place] appropriate cooling/warming therapies per order- Administer medications as ordered- Instruct and encourage patient and family to use good hand hygiene technique- Identify and instruct in appropriate isolation precautions for identified infection/condition  Outcome: Progressing  Goal: Absence of fever/infection during neutropenic period  Description: INTERVENTIONS:- Monitor WBC  Outcome: Progressing     Problem: SAFETY ADULT  Goal: Patient will remain free of falls  Description: INTERVENTIONS:- Educate patient/family on patient safety including physical limitations- Instruct patient to call for assistance with activity - Consult OT/PT to assist with strengthening/mobility - Keep Call bell within reach- Keep bed low and locked with side rails adjusted as appropriate- Keep care items and personal belongings within reach- Initiate and maintain comfort rounds- Make Fall Risk Sign visible to staff- Offer Toileting every 2 Hours, in advance of need- Initiate/Maintain alarm-  Obtain necessary fall risk management equipment: socks- Apply yellow socks and bracelet for high fall risk patients- Consider moving patient to room near nurses station  Outcome: Progressing  Goal: Maintain or return to baseline ADL function  Description: INTERVENTIONS:-  Assess patient's ability to carry out ADLs; assess patient's baseline for ADL function and identify physical deficits which impact ability to perform ADLs (bathing, care of mouth/teeth, toileting, grooming, dressing, etc.)- Assess/evaluate cause of self-care deficits - Assess range of motion- Assess patient's mobility; develop plan if impaired- Assess patient's need for assistive devices and provide as appropriate- Encourage maximum independence but intervene and supervise when necessary- Involve family in performance of ADLs- Assess for home care needs following discharge - Consider OT consult to assist with ADL evaluation and planning for discharge- Provide patient education as appropriate  Outcome: Progressing  Goal: Maintains/Returns to pre admission functional level  Description: INTERVENTIONS:- Perform AM-PAC 6 Click Basic Mobility/ Daily Activity assessment daily.- Set and communicate daily mobility goal to care team and patient/family/caregiver. - Collaborate with rehabilitation services on mobility goals if consulted- Perform Range of Motion 2 times a day.- Reposition patient every 2 hours.- Dangle patient 2 times a day- Stand patient 2 times a day- Ambulate patient 2 times a day- Out of bed to chair 2 times a day - Out of bed for meals 2 times a day- Out of bed for toileting- Record patient progress and toleration of activity level   Outcome: Progressing     Problem: DISCHARGE PLANNING  Goal: Discharge to home or other facility with appropriate resources  Description: INTERVENTIONS:- Identify barriers to discharge w/patient and caregiver- Arrange for needed discharge resources and transportation as appropriate- Identify discharge learning  needs (meds, wound care, etc.)- Arrange for interpretive services to assist at discharge as needed- Refer to Case Management Department for coordinating discharge planning if the patient needs post-hospital services based on physician/advanced practitioner order or complex needs related to functional status, cognitive ability, or social support system  Outcome: Progressing     Problem: Knowledge Deficit  Goal: Patient/family/caregiver demonstrates understanding of disease process, treatment plan, medications, and discharge instructions  Description: Complete learning assessment and assess knowledge base.Interventions:- Provide teaching at level of understanding- Provide teaching via preferred learning methods  Outcome: Progressing     Problem: GASTROINTESTINAL - ADULT  Goal: Minimal or absence of nausea and/or vomiting  Description: INTERVENTIONS:- Administer IV fluids if ordered to ensure adequate hydration- Maintain NPO status until nausea and vomiting are resolved- Nasogastric tube if ordered- Administer ordered antiemetic medications as needed- Provide nonpharmacologic comfort measures as appropriate- Advance diet as tolerated, if ordered- Consider nutrition services referral to assist patient with adequate nutrition and appropriate food choices  Outcome: Progressing  Goal: Maintains or returns to baseline bowel function  Description: INTERVENTIONS:- Assess bowel function- Encourage oral fluids to ensure adequate hydration- Administer IV fluids if ordered to ensure adequate hydration- Administer ordered medications as needed- Encourage mobilization and activity- Consider nutritional services referral to assist patient with adequate nutrition and appropriate food choices  Outcome: Progressing  Goal: Maintains adequate nutritional intake  Description: INTERVENTIONS:- Monitor percentage of each meal consumed- Identify factors contributing to decreased intake, treat as appropriate- Assist with meals as needed-  Monitor I&O, weight, and lab values if indicated- Obtain nutrition services referral as needed  Outcome: Progressing  Goal: Establish and maintain optimal ostomy function  Description: INTERVENTIONS:- Assess bowel function- Encourage oral fluids to ensure adequate hydration- Administer IV fluids if ordered to ensure adequate hydration - Administer ordered medications as needed- Encourage mobilization and activity- Nutrition services referral to assist patient with appropriate food choices- Assess stoma site- Consider wound care consult   Outcome: Progressing  Goal: Oral mucous membranes remain intact  Description: INTERVENTIONS- Assess oral mucosa and hygiene practices- Implement preventative oral hygiene regimen- Implement oral medicated treatments as ordered- Initiate Nutrition services referral as needed  Outcome: Progressing

## 2025-04-07 NOTE — PLAN OF CARE
Problem: PAIN - ADULT  Goal: Verbalizes/displays adequate comfort level or baseline comfort level  Description: Interventions:- Encourage patient to monitor pain and request assistance- Assess pain using appropriate pain scale- Administer analgesics based on type and severity of pain and evaluate response- Implement non-pharmacological measures as appropriate and evaluate response- Consider cultural and social influences on pain and pain management- Notify physician/advanced practitioner if interventions unsuccessful or patient reports new pain  Outcome: Progressing     Problem: INFECTION - ADULT  Goal: Absence or prevention of progression during hospitalization  Description: INTERVENTIONS:- Assess and monitor for signs and symptoms of infection- Monitor lab/diagnostic results- Monitor all insertion sites, i.e. indwelling lines, tubes, and drains- Monitor endotracheal if appropriate and nasal secretions for changes in amount and color- [place] appropriate cooling/warming therapies per order- Administer medications as ordered- Instruct and encourage patient and family to use good hand hygiene technique- Identify and instruct in appropriate isolation precautions for identified infection/condition  Outcome: Progressing  Goal: Absence of fever/infection during neutropenic period  Description: INTERVENTIONS:- Monitor WBC  Outcome: Progressing     Problem: SAFETY ADULT  Goal: Patient will remain free of falls  Description: INTERVENTIONS:- Educate patient/family on patient safety including physical limitations- Instruct patient to call for assistance with activity - Consult OT/PT to assist with strengthening/mobility - Keep Call bell within reach- Keep bed low and locked with side rails adjusted as appropriate- Keep care items and personal belongings within reach- Initiate and maintain comfort rounds- Make Fall Risk Sign visible to staff- Offer Toileting every x Hours, in advance of need- Initiate/Maintain xalarm-  Obtain necessary fall risk management equipment: x- Apply yellow socks and bracelet for high fall risk patients- Consider moving patient to room near nurses station  Outcome: Progressing  Goal: Maintain or return to baseline ADL function  Description: INTERVENTIONS:-  Assess patient's ability to carry out ADLs; assess patient's baseline for ADL function and identify physical deficits which impact ability to perform ADLs (bathing, care of mouth/teeth, toileting, grooming, dressing, etc.)- Assess/evaluate cause of self-care deficits - Assess range of motion- Assess patient's mobility; develop plan if impaired- Assess patient's need for assistive devices and provide as appropriate- Encourage maximum independence but intervene and supervise when necessary- Involve family in performance of ADLs- Assess for home care needs following discharge - Consider OT consult to assist with ADL evaluation and planning for discharge- Provide patient education as appropriate  Outcome: Progressing  Goal: Maintains/Returns to pre admission functional level  Description: INTERVENTIONS:- Perform AM-PAC 6 Click Basic Mobility/ Daily Activity assessment daily.- Set and communicate daily mobility goal to care team and patient/family/caregiver. - Collaborate with rehabilitation services on mobility goals if consulted- Perform Range of Motion x times a day.- Reposition patient every x hours.- Dangle patient xxx times a day- Stand patient x times a day- Ambulate patient x times a day- Out of bed to chair x times a day - Out of bed for meals x times a day- Out of bed for toileting- Record patient progress and toleration of activity level   Outcome: Progressing     Problem: DISCHARGE PLANNING  Goal: Discharge to home or other facility with appropriate resources  Description: INTERVENTIONS:- Identify barriers to discharge w/patient and caregiver- Arrange for needed discharge resources and transportation as appropriate- Identify discharge learning  needs (meds, wound care, etc.)- Arrange for interpretive services to assist at discharge as needed- Refer to Case Management Department for coordinating discharge planning if the patient needs post-hospital services based on physician/advanced practitioner order or complex needs related to functional status, cognitive ability, or social support system  Outcome: Progressing     Problem: Knowledge Deficit  Goal: Patient/family/caregiver demonstrates understanding of disease process, treatment plan, medications, and discharge instructions  Description: Complete learning assessment and assess knowledge base.Interventions:- Provide teaching at level of understanding- Provide teaching via preferred learning methods  Outcome: Progressing     Problem: GASTROINTESTINAL - ADULT  Goal: Minimal or absence of nausea and/or vomiting  Description: INTERVENTIONS:- Administer IV fluids if ordered to ensure adequate hydration- Maintain NPO status until nausea and vomiting are resolved- Nasogastric tube if ordered- Administer ordered antiemetic medications as needed- Provide nonpharmacologic comfort measures as appropriate- Advance diet as tolerated, if ordered- Consider nutrition services referral to assist patient with adequate nutrition and appropriate food choices  Outcome: Progressing  Goal: Maintains or returns to baseline bowel function  Description: INTERVENTIONS:- Assess bowel function- Encourage oral fluids to ensure adequate hydration- Administer IV fluids if ordered to ensure adequate hydration- Administer ordered medications as needed- Encourage mobilization and activity- Consider nutritional services referral to assist patient with adequate nutrition and appropriate food choices  Outcome: Progressing  Goal: Maintains adequate nutritional intake  Description: INTERVENTIONS:- Monitor percentage of each meal consumed- Identify factors contributing to decreased intake, treat as appropriate- Assist with meals as needed-  Monitor I&O, weight, and lab values if indicated- Obtain nutrition services referral as needed  Outcome: Progressing  Goal: Establish and maintain optimal ostomy function  Description: INTERVENTIONS:- Assess bowel function- Encourage oral fluids to ensure adequate hydration- Administer IV fluids if ordered to ensure adequate hydration - Administer ordered medications as needed- Encourage mobilization and activity- Nutrition services referral to assist patient with appropriate food choices- Assess stoma site- Consider wound care consult   Outcome: Progressing  Goal: Oral mucous membranes remain intact  Description: INTERVENTIONS- Assess oral mucosa and hygiene practices- Implement preventative oral hygiene regimen- Implement oral medicated treatments as ordered- Initiate Nutrition services referral as needed  Outcome: Progressing

## 2025-04-07 NOTE — ASSESSMENT & PLAN NOTE
Recent Labs     04/06/25  0020 04/07/25  0321   WBC 16.62* 11.81*      WBC 16.62K on admission suspect secondary to acute diverticulitis  Continue IV Zosyn

## 2025-04-07 NOTE — ASSESSMENT & PLAN NOTE
CTA: There is significant inflammatory change within the mesentery of the upper abdomen in the region of the distal stomach, first and second portion of the duodenum, and a short segment of jejunum. Although this is nonspecific it may possibly be related to ulcer disease. mild diverticulitis. High grade stenosis vs occlusion of proximal celiac axis for 12 mm. The distal celiac axis and its branches are reconstituted via collaterals. 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     EGD shows mild nonerosive esophagitis, mod erosive gastritis, mild-mod duodenitis of the bulb  Pepcid added along with PPI, carafate  Continue to monitor symptoms, if not improving, consider repeat Ct with oral contrast on Thursday

## 2025-04-07 NOTE — PROGRESS NOTES
"Progress Note - Hospitalist   Name: Luis Powell 54 y.o. male I MRN: [id number]  Unit/Bed#: -01 I Date of Admission: 4/5/2025   Date of Service: 4/7/2025 I Hospital Day: 1    Assessment & Plan  Epigastric pain  Episodic epigastric AP that onset 4/4 at 0100 with associated nausea and vomiting  CT A/P: \"There is significant inflammatory change within the mesentery of the upper abdomen in the region of the distal stomach, first and second portion of the duodenum, and a short segment of jejunum. Although this is nonspecific it may possibly be related to ulcer disease.\"  Patient had EGD that showed mild nonerosive esophagitis, moderate erosive gastritis without bleeding biopsied for H. pylori, mild to moderate duodenitis of the bulb  Cussed with gastroenterology, continue PPI twice daily, Carafate before meals, Pepcid at bedtime.  If intermittent abdominal pain persists in the next 24 hours, surgery recommended to repeat CT with IV and oral contrast.  Celiac artery stenosis (HCC)  CT A/P: \"There is either high-grade stenosis versus occlusion of the proximal celiac axis spanning a length of approximately 12 mm. The distal celiac axis and its branches are reconstituted via collaterals. 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 There is anastomosis between the ANNA and SMA (arc of [place] variant).  \"  Imaging reviewed by vascular surgery-states that celiac artery stenosis was present on imaging from 2023 and lactic acid is normal, therefore do not suspect this is etiology for symptoms  No indication for anticoagulation at this time  Follow-up outpatient  If symptoms fail to improve-consider reevaluation  Sigmoid diverticulitis  CT A/P: \"Mild acute sigmoid diverticulitis. \"  History of sigmoid diverticulitis with microperforation and abscess in 2019-treated conservatively with IVF and bowel rest  Continue IV Zosyn  Leukocytosis  Recent Labs     04/06/25  0020 04/07/25  0321   WBC 16.62* 11.81*      WBC " 16.62K on admission suspect secondary to acute diverticulitis  Continue IV Zosyn  Alcohol abuse  Drink 7-8 beers every other day   CIWA protocol  Last drink 4/5   Chronic left-sided low back pain with left-sided sciatica  Continue home tizanidine 4 Mg at bedtime  Elevated LFTs (Resolved: 4/7/2025)  Recent Labs     04/06/25  0020 04/07/25  0321   AST 45* 13   ALT 78* 36   ALKPHOS 87 54   TBILI 0.51 1.01*   BILIDIR  --  0.13          VTE Pharmacologic Prophylaxis: VTE Score: 3 Moderate Risk (Score 3-4) - Pharmacological DVT Prophylaxis Ordered: enoxaparin (Lovenox).    Mobility:   Basic Mobility Inpatient Raw Score: 24  JH-HLM Goal: 8: Walk 250 feet or more  JH-HLM Achieved: 7: Walk 25 feet or more  JH-HLM Goal achieved. Continue to encourage appropriate mobility.    Patient Centered Rounds: I performed bedside rounds with nursing staff today.   Discussions with Specialists or Other Care Team Provider: cm, GI    Education and Discussions with Family / Patient: Patient declined call to .     Current Length of Stay: 1 day(s)  Current Patient Status: Inpatient   Certification Statement: The patient will continue to require additional inpatient hospital stay due to epigastric pain   Discharge Plan: Anticipate discharge in 24-48 hrs to home.    Code Status: Level 1 - Full Code    Subjective     No significant pain currently.     Objective :  Temp:  [97.1 °F (36.2 °C)-98.8 °F (37.1 °C)] 98.8 °F (37.1 °C)  HR:  [46-61] 59  BP: (120-181)/(60-92) 154/76  Resp:  [12-24] 18  SpO2:  [94 %-98 %] 96 %  O2 Device: None (Room air)    Body mass index is 35.18 kg/m².     Input and Output Summary (last 24 hours):     Intake/Output Summary (Last 24 hours) at 4/7/2025 1602  Last data filed at 4/7/2025 0859  Gross per 24 hour   Intake 3075 ml   Output 1500 ml   Net 1575 ml       Physical Exam  Vitals and nursing note reviewed.   Constitutional:       General: He is not in acute distress.     Appearance: He is not diaphoretic.    HENT:      Head: Normocephalic.   Eyes:      General:         Right eye: No discharge.         Left eye: No discharge.   Cardiovascular:      Rate and Rhythm: Normal rate and regular rhythm.   Pulmonary:      Effort: Pulmonary effort is normal. No respiratory distress.      Breath sounds: Normal breath sounds. No wheezing, rhonchi or rales.   Abdominal:      General: There is no distension.      Palpations: Abdomen is soft.      Tenderness: There is no abdominal tenderness. There is no guarding or rebound.   Musculoskeletal:      Cervical back: Normal range of motion.      Right lower leg: No edema.      Left lower leg: No edema.   Skin:     General: Skin is warm.   Neurological:      Mental Status: He is alert and oriented to person, place, and time.   Psychiatric:         Mood and Affect: Mood normal.         Behavior: Behavior normal.           Lines/Drains:              Lab Results: I have reviewed the following results:   Results from last 7 days   Lab Units 04/07/25  0321 04/06/25  0020   WBC Thousand/uL 11.81* 16.62*   HEMOGLOBIN g/dL 15.3 17.8*   HEMATOCRIT % 47.5 54.6*   PLATELETS Thousands/uL 159 234   SEGS PCT %  --  69   LYMPHO PCT %  --  19   MONO PCT %  --  10   EOS PCT %  --  1     Results from last 7 days   Lab Units 04/07/25  0321   SODIUM mmol/L 135   POTASSIUM mmol/L 4.4   CHLORIDE mmol/L 106   CO2 mmol/L 18*   BUN mg/dL 17   CREATININE mg/dL 0.80   ANION GAP mmol/L 11   CALCIUM mg/dL 8.2*   ALBUMIN g/dL 3.7   TOTAL BILIRUBIN mg/dL 1.01*   ALK PHOS U/L 54   ALT U/L 36   AST U/L 13   GLUCOSE RANDOM mg/dL 126                 Results from last 7 days   Lab Units 04/06/25  0124   LACTIC ACID mmol/L 1.2       Recent Cultures (last 7 days):         Imaging Results Review: I reviewed radiology reports from this admission including: CT abdomen/pelvis and Ultrasound(s).  Other Study Results Review: No additional pertinent studies reviewed.    Last 24 Hours Medication List:     Current  Facility-Administered Medications:     acetaminophen (TYLENOL) tablet 650 mg, Q6H PRN    enoxaparin (LOVENOX) subcutaneous injection 40 mg, Daily    famotidine (PEPCID) tablet 40 mg, HS    HYDROmorphone (DILAUDID) injection 0.5 mg, Q6H PRN    lactated ringers infusion, Continuous, Last Rate: 125 mL/hr (04/07/25 0939)    nicotine (NICODERM CQ) 21 mg/24 hr TD 24 hr patch 1 patch, Daily    ondansetron (ZOFRAN) injection 4 mg, Q4H PRN    oxyCODONE (ROXICODONE) IR tablet 5 mg, Q6H PRN    oxyCODONE (ROXICODONE) split tablet 2.5 mg, Q6H PRN    pantoprazole (PROTONIX) injection 40 mg, Q12H LINDA    piperacillin-tazobactam (ZOSYN) IVPB (EXTENDED INFUSION) 4.5 g, Q8H, Last Rate: 25 mL/hr at 04/07/25 0321    sucralfate (CARAFATE) tablet 1 g, 4x Daily (AC & HS)    tiZANidine (ZANAFLEX) tablet 4 mg, HS    Administrative Statements   Today, Patient Was Seen By: Zulma Joseph MD      **Please Note: This note may have been constructed using a voice recognition system.**

## 2025-04-08 ENCOUNTER — APPOINTMENT (INPATIENT)
Dept: NON INVASIVE DIAGNOSTICS | Facility: HOSPITAL | Age: 55
DRG: 392 | End: 2025-04-08
Payer: COMMERCIAL

## 2025-04-08 LAB
ANION GAP SERPL CALCULATED.3IONS-SCNC: 4 MMOL/L (ref 4–13)
BUN SERPL-MCNC: 17 MG/DL (ref 5–25)
CALCIUM SERPL-MCNC: 8.5 MG/DL (ref 8.4–10.2)
CHLORIDE SERPL-SCNC: 104 MMOL/L (ref 96–108)
CO2 SERPL-SCNC: 29 MMOL/L (ref 21–32)
CREAT SERPL-MCNC: 1 MG/DL (ref 0.6–1.3)
ERYTHROCYTE [DISTWIDTH] IN BLOOD BY AUTOMATED COUNT: 14.6 % (ref 11.6–15.1)
GFR SERPL CREATININE-BSD FRML MDRD: 84 ML/MIN/1.73SQ M
GLUCOSE SERPL-MCNC: 132 MG/DL (ref 65–140)
HCT VFR BLD AUTO: 49.3 % (ref 36.5–49.3)
HGB BLD-MCNC: 16.3 G/DL (ref 12–17)
MCH RBC QN AUTO: 28.8 PG (ref 26.8–34.3)
MCHC RBC AUTO-ENTMCNC: 33.1 G/DL (ref 31.4–37.4)
MCV RBC AUTO: 87 FL (ref 82–98)
PLATELET # BLD AUTO: 181 THOUSANDS/UL (ref 149–390)
PMV BLD AUTO: 11.5 FL (ref 8.9–12.7)
POTASSIUM SERPL-SCNC: 4.5 MMOL/L (ref 3.5–5.3)
RBC # BLD AUTO: 5.66 MILLION/UL (ref 3.88–5.62)
SODIUM SERPL-SCNC: 137 MMOL/L (ref 135–147)
WBC # BLD AUTO: 13.9 THOUSAND/UL (ref 4.31–10.16)

## 2025-04-08 PROCEDURE — 99232 SBSQ HOSP IP/OBS MODERATE 35: CPT | Performed by: SURGERY

## 2025-04-08 PROCEDURE — 99232 SBSQ HOSP IP/OBS MODERATE 35: CPT | Performed by: INTERNAL MEDICINE

## 2025-04-08 PROCEDURE — 80048 BASIC METABOLIC PNL TOTAL CA: CPT | Performed by: INTERNAL MEDICINE

## 2025-04-08 PROCEDURE — 85027 COMPLETE CBC AUTOMATED: CPT | Performed by: INTERNAL MEDICINE

## 2025-04-08 PROCEDURE — 93975 VASCULAR STUDY: CPT | Performed by: SURGERY

## 2025-04-08 PROCEDURE — 93975 VASCULAR STUDY: CPT

## 2025-04-08 RX ORDER — ATORVASTATIN CALCIUM 40 MG/1
40 TABLET, FILM COATED ORAL
Status: DISCONTINUED | OUTPATIENT
Start: 2025-04-08 | End: 2025-04-09 | Stop reason: HOSPADM

## 2025-04-08 RX ORDER — DIPHENHYDRAMINE HCL 25 MG
50 TABLET ORAL
Status: DISCONTINUED | OUTPATIENT
Start: 2025-04-08 | End: 2025-04-09 | Stop reason: HOSPADM

## 2025-04-08 RX ORDER — SUCRALFATE 1 G/1
1 TABLET ORAL
Qty: 120 TABLET | Refills: 0 | Status: SHIPPED | OUTPATIENT
Start: 2025-04-08 | End: 2025-04-09

## 2025-04-08 RX ORDER — FAMOTIDINE 40 MG/1
40 TABLET, FILM COATED ORAL
Qty: 30 TABLET | Refills: 0 | Status: SHIPPED | OUTPATIENT
Start: 2025-04-08 | End: 2025-04-09

## 2025-04-08 RX ORDER — KETOROLAC TROMETHAMINE 30 MG/ML
15 INJECTION, SOLUTION INTRAMUSCULAR; INTRAVENOUS EVERY 6 HOURS PRN
Status: DISCONTINUED | OUTPATIENT
Start: 2025-04-08 | End: 2025-04-09 | Stop reason: HOSPADM

## 2025-04-08 RX ORDER — ASPIRIN 81 MG/1
81 TABLET ORAL DAILY
Status: DISCONTINUED | OUTPATIENT
Start: 2025-04-08 | End: 2025-04-09 | Stop reason: HOSPADM

## 2025-04-08 RX ORDER — PANTOPRAZOLE SODIUM 40 MG/1
40 TABLET, DELAYED RELEASE ORAL 2 TIMES DAILY
Qty: 60 TABLET | Refills: 0 | Status: SHIPPED | OUTPATIENT
Start: 2025-04-08 | End: 2025-04-09

## 2025-04-08 RX ORDER — ALPRAZOLAM 0.25 MG
0.25 TABLET ORAL 2 TIMES DAILY PRN
Status: DISCONTINUED | OUTPATIENT
Start: 2025-04-08 | End: 2025-04-09 | Stop reason: HOSPADM

## 2025-04-08 RX ADMIN — PANTOPRAZOLE SODIUM 40 MG: 40 INJECTION, POWDER, FOR SOLUTION INTRAVENOUS at 21:19

## 2025-04-08 RX ADMIN — HYDROMORPHONE HYDROCHLORIDE 0.5 MG: 1 INJECTION, SOLUTION INTRAMUSCULAR; INTRAVENOUS; SUBCUTANEOUS at 00:01

## 2025-04-08 RX ADMIN — SUCRALFATE 1 G: 1 TABLET ORAL at 04:35

## 2025-04-08 RX ADMIN — SUCRALFATE 1 G: 1 TABLET ORAL at 21:19

## 2025-04-08 RX ADMIN — NICOTINE 1 PATCH: 21 PATCH, EXTENDED RELEASE TRANSDERMAL at 08:47

## 2025-04-08 RX ADMIN — SUCRALFATE 1 G: 1 TABLET ORAL at 17:48

## 2025-04-08 RX ADMIN — KETOROLAC TROMETHAMINE 15 MG: 30 INJECTION, SOLUTION INTRAMUSCULAR at 18:02

## 2025-04-08 RX ADMIN — OXYCODONE HYDROCHLORIDE 5 MG: 5 TABLET ORAL at 04:34

## 2025-04-08 RX ADMIN — HYDROMORPHONE HYDROCHLORIDE 0.5 MG: 1 INJECTION, SOLUTION INTRAMUSCULAR; INTRAVENOUS; SUBCUTANEOUS at 07:33

## 2025-04-08 RX ADMIN — PANTOPRAZOLE SODIUM 40 MG: 40 INJECTION, POWDER, FOR SOLUTION INTRAVENOUS at 08:37

## 2025-04-08 RX ADMIN — SUCRALFATE 1 G: 1 TABLET ORAL at 13:30

## 2025-04-08 RX ADMIN — PIPERACILLIN AND TAZOBACTAM 4.5 G: 4; .5 INJECTION, POWDER, FOR SOLUTION INTRAVENOUS at 17:52

## 2025-04-08 RX ADMIN — DIPHENHYDRAMINE HYDROCHLORIDE 50 MG: 25 TABLET ORAL at 00:24

## 2025-04-08 RX ADMIN — FAMOTIDINE 40 MG: 20 TABLET, FILM COATED ORAL at 21:19

## 2025-04-08 RX ADMIN — PIPERACILLIN AND TAZOBACTAM 4.5 G: 4; .5 INJECTION, POWDER, FOR SOLUTION INTRAVENOUS at 00:01

## 2025-04-08 RX ADMIN — OXYCODONE HYDROCHLORIDE 5 MG: 5 TABLET ORAL at 15:41

## 2025-04-08 RX ADMIN — PIPERACILLIN AND TAZOBACTAM 4.5 G: 4; .5 INJECTION, POWDER, FOR SOLUTION INTRAVENOUS at 23:58

## 2025-04-08 RX ADMIN — PIPERACILLIN AND TAZOBACTAM 4.5 G: 4; .5 INJECTION, POWDER, FOR SOLUTION INTRAVENOUS at 08:37

## 2025-04-08 RX ADMIN — SODIUM CHLORIDE, SODIUM LACTATE, POTASSIUM CHLORIDE, AND CALCIUM CHLORIDE 125 ML/HR: .6; .31; .03; .02 INJECTION, SOLUTION INTRAVENOUS at 00:03

## 2025-04-08 RX ADMIN — DIPHENHYDRAMINE HYDROCHLORIDE 50 MG: 25 TABLET ORAL at 21:19

## 2025-04-08 RX ADMIN — OXYCODONE HYDROCHLORIDE 5 MG: 5 TABLET ORAL at 20:12

## 2025-04-08 NOTE — PROGRESS NOTES
Progress Note - Gastroenterology   Name: Luis Powell 54 y.o. male I MRN: [id number]  Unit/Bed#: -01 I Date of Admission: 4/5/2025   Date of Service: 4/8/2025 I Hospital Day: 2    Assessment & Plan  Epigastric pain  54M with hx/o HTN, anxiety, tobacco/EtOH use who presented to the ED from home on 4/5/2025 for intermittent severe epigastric/substernal chest pain, vomiting since Fri 4/4. Labs notable for leukocytosis, elevated LFTs. CTA C/A/P showed inflammation of distal stomach, small bowel, acute uncomplicated sigmoid diverticulitis, celiac artery stenosis with collaterals, 50% SMA stenosis. GB U/S showed hepatic steatosis.  » EGD 4/7 found non-erosive esophagitis, mild erosive gastritis, mild-moderate duodenitis.  DDx includes costochondritis, referred back pain, anxiety.    Plan:  GI will sign off at this time as ongoing pain not congruent with GI etiology. On appropriate therapy plan for GI diagnoses  House diet ? no change in sx with eating/drinking  Continue pantoprazole 40 mg Q12 ? continue for total 6 weeks therapy  Optimize pain control for suspected musculoskeletal/neurogenic pain etiology  Counseled on smoking and alcohol cessation  Celiac artery stenosis (HCC)  CTA: There is either high-grade stenosis versus occlusion of the proximal celiac axis spanning a length of approximately 12 mm. The distal celiac axis and its branches are reconstituted via collaterals. 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 There is anastomosis between the ANNA and SMA (arc of Riolan variant).  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 No acute vascular surgery intervention warranted at this time, low suspicion for mesenteric ischemia or for celiac axis stenosis to be source of pt's pain given that he is not having any abdominal pain nor change in sx with eating/drinking  Sigmoid diverticulitis  Acute uncomplicated mild sigmoid diverticulitis on CT; lower abdominal pain on exam  Abx: Zosyn started  4/6 ? continue for total 10 days abx  Needs outpatient colonoscopy in 6-8 weeks due to history of 1 cm adenoma in 2019 (overdue for 3 yr colonoscopy recall)  Leukocytosis  4/8: improving, abx as noted above  Alcohol abuse  Drinks 4-10 beers a day in order to sleep due to pain  Counseled on alcohol cessation  Recommend CIWA protocol, thiamine, folic acid and MVI replacement per primary team  Chronic left-sided low back pain with left-sided sciatica  On tizanidine; self-medicating with EtOH at home as noted above  Management per primary team    Subjective   Frustrated that there has not been a clear dx for his substernal pain yet. Tolerating house diet without N/V.    Objective :  Temp:  [97.5 °F (36.4 °C)-98.8 °F (37.1 °C)] 98.2 °F (36.8 °C)  HR:  [49-65] 49  BP: (136-217)/() 136/73  Resp:  [12-18] 12  SpO2:  [96 %-99 %] 97 %  O2 Device: None (Room air)    Physical Exam  Vitals and nursing note reviewed.   Constitutional:       General: He is not in acute distress.     Appearance: He is not toxic-appearing.   HENT:      Head: Normocephalic and atraumatic.      Mouth/Throat:      Mouth: Mucous membranes are moist.      Pharynx: Oropharynx is clear.   Eyes:      General: No scleral icterus.     Extraocular Movements: Extraocular movements intact.   Neck:      Trachea: Phonation normal.   Pulmonary:      Effort: Pulmonary effort is normal. No respiratory distress.   Chest:      Chest wall: Tenderness (distal sternum/xyphoid process) present.   Abdominal:      General: Bowel sounds are normal. There is no distension or abdominal bruit.      Palpations: Abdomen is soft. There is no hepatomegaly or splenomegaly.      Tenderness: There is no abdominal tenderness. There is no guarding or rebound.   Musculoskeletal:      Cervical back: Neck supple.      Comments: Moving all 4 extremities spontaneously   Skin:     General: Skin is warm and dry.      Capillary Refill: Capillary refill takes less than 2 seconds.       Coloration: Skin is not jaundiced or pale.   Neurological:      General: No focal deficit present.      Mental Status: He is alert and oriented to person, place, and time.   Psychiatric:         Mood and Affect: Mood is anxious (congruent affect).         Behavior: Behavior is agitated.         Thought Content: Thought content normal.         Lab Results: I have reviewed the following results:CBC/BMP:   .     04/08/25  0429   WBC 13.90*   HGB 16.3   HCT 49.3      SODIUM 137   K 4.5      CO2 29   BUN 17   CREATININE 1.00   GLUC 132        Imaging Results Review: I reviewed radiology reports from this admission including: CT chest, CT abdomen/pelvis, CT A abd/pelvis, and Ultrasound(s).  Other Study Results Review: No additional pertinent studies reviewed.

## 2025-04-08 NOTE — ASSESSMENT & PLAN NOTE
Recent Labs     04/07/25  0321 04/07/25  1838 04/08/25  0429   WBC 11.81* 16.40* 13.90*      WBC 16.62K on admission suspect secondary to acute diverticulitis  Continue IV Zosyn

## 2025-04-08 NOTE — ASSESSMENT & PLAN NOTE
CTA: There is significant inflammatory change within the mesentery of the upper abdomen in the region of the distal stomach, first and second portion of the duodenum, and a short segment of jejunum. Although this is nonspecific it may possibly be related to ulcer disease. mild diverticulitis. High grade stenosis vs occlusion of proximal celiac axis for 12 mm. The distal celiac axis and its branches are reconstituted via collaterals. 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   - discussed with vascular fellow on call. Sma occlusion seen on imaging from 2023. No acute vascular surgery intervention warranted at this time  - low suspicion for mesenteric ischemia or for celiac axis stenosis to be source of pt's pain  --duplex noted  -CT and Cta reviewed. Pt with sharp angulation at the clint of the diaphragm. Pt's symptoms mainly consistent with musculoskeletal type symptoms. If not better, pt can follow up op with vascular surgery and consider celiac block for MALS type symptoms.   -Vascular surgery will sign off. Please call with concerns.   Rest of care per primary

## 2025-04-08 NOTE — ASSESSMENT & PLAN NOTE
54M with hx/o HTN, anxiety, tobacco/EtOH use who presented to the ED from home on 4/5/2025 for intermittent severe epigastric/substernal chest pain, vomiting since Fri 4/4. Labs notable for leukocytosis, elevated LFTs. CTA C/A/P showed inflammation of distal stomach, small bowel, acute uncomplicated sigmoid diverticulitis, celiac artery stenosis with collaterals, 50% SMA stenosis. GB U/S showed hepatic steatosis.  » EGD 4/7 found non-erosive esophagitis, mild erosive gastritis, mild-moderate duodenitis.  DDx includes costochondritis, referred back pain, anxiety.    Plan:  GI will sign off at this time as ongoing pain not congruent with GI etiology. On appropriate therapy plan for GI diagnoses  House diet ? no change in sx with eating/drinking  Continue pantoprazole 40 mg Q12 ? continue for total 6 weeks therapy  Optimize pain control for suspected musculoskeletal/neurogenic pain etiology  Counseled on smoking and alcohol cessation

## 2025-04-08 NOTE — ASSESSMENT & PLAN NOTE
Drinks 4-10 beers a day in order to sleep due to pain  Counseled on alcohol cessation  Recommend CIWA protocol, thiamine, folic acid and MVI replacement per primary team

## 2025-04-08 NOTE — PROGRESS NOTES
Progress Note - Vascular Surgery   Name: Luis Powell 54 y.o. male I MRN: [id number]  Unit/Bed#: -01 I Date of Admission: 4/5/2025   Date of Service: 4/8/2025 I Hospital Day: 2    Assessment & Plan  Celiac artery stenosis (HCC)  CTA: There is significant inflammatory change within the mesentery of the upper abdomen in the region of the distal stomach, first and second portion of the duodenum, and a short segment of jejunum. Although this is nonspecific it may possibly be related to ulcer disease. mild diverticulitis. High grade stenosis vs occlusion of proximal celiac axis for 12 mm. The distal celiac axis and its branches are reconstituted via collaterals. 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   - discussed with vascular fellow on call. Sma occlusion seen on imaging from 2023. No acute vascular surgery intervention warranted at this time  - low suspicion for mesenteric ischemia or for celiac axis stenosis to be source of pt's pain  --duplex noted  -CT and Cta reviewed. Pt with sharp angulation at the clint of the diaphragm. Pt's symptoms mainly consistent with musculoskeletal type symptoms. If not better, pt can follow up op with vascular surgery and consider celiac block for MALS type symptoms.   -Vascular surgery will sign off. Please call with concerns.   Rest of care per primary  Chronic left-sided low back pain with left-sided sciatica    Sigmoid diverticulitis    Alcohol abuse    Leukocytosis    Hypertension          Subjective   Sitting in bed  C/o pain at the xiphoid process  No trouble with diet, no N/V    Objective :  Temp:  [97.5 °F (36.4 °C)-98.8 °F (37.1 °C)] 98.2 °F (36.8 °C)  HR:  [49-67] 67  BP: (136-217)/() 205/94  Resp:  [12-18] 12  SpO2:  [96 %-99 %] 97 %  O2 Device: None (Room air)    I/O         04/06 0701 04/07 0700 04/07 0701 04/08 0700 04/08 0701 04/09 0700    P.O. 0 880 240    I.V. (mL/kg) 2675 (24.8) 2568.8 (23.8)     IV Piggyback 200 873.8     Total  Intake(mL/kg) 2875 (26.6) 4322.5 (40) 240 (2.2)    Urine (mL/kg/hr) 1500 (0.6) 2100 (0.8) 0 (0)    Total Output 1500 2100 0    Net +1375 +2222.5 +240           Unmeasured Urine Occurrence  2 x             Physical Exam  Awake, alert, NAD  AT, NC  CTAB  RRR  Abdomen soft, non-tender   Pain over the epigastric region with palpation, point tenderness consistent with MSK type pain      Lab Results: I have reviewed the following results:  Recent Labs     04/06/25  0020 04/06/25  0124 04/06/25  0218 04/07/25  0321 04/07/25  1838 04/07/25 2054 04/08/25 0429   WBC 16.62*  --   --  11.81* 16.40*  --  13.90*   HGB 17.8*  --   --  15.3 17.7*  --  16.3   HCT 54.6*  --   --  47.5 53.5*  --  49.3     --   --  159 191  --  181   SODIUM 137  --   --  135 136  --  137   K 4.1  --   --  4.4 4.1  --  4.5     --   --  106 104  --  104   CO2 28  --   --  18* 22  --  29   BUN 28*  --   --  17 21  --  17   CREATININE 1.21  --   --  0.80 0.99  --  1.00   GLUC 121  --   --  126 132  --  132   MG  --   --   --  2.4  --   --   --    PHOS  --   --   --  2.9  --   --   --    AST 45*  --   --  13 13  --   --    ALT 78*  --   --  36 40  --   --    ALB 4.5  --   --  3.7 4.2  --   --    TBILI 0.51  --   --  1.01* 0.62  --   --    ALKPHOS 87  --   --  54 75  --   --    HSTNI0 6  --   --   --   --   --   --    HSTNI2  --   --  6  --   --   --   --    LACTICACID  --    < >  --   --  2.1* 0.9  --     < > = values in this interval not displayed.         VTE Pharmacologic Prophylaxis: VTE covered by:  enoxaparin, Subcutaneous     VTE Mechanical Prophylaxis: sequential compression device

## 2025-04-08 NOTE — ASSESSMENT & PLAN NOTE
"CT A/P: \"Mild acute sigmoid diverticulitis. \"  History of sigmoid diverticulitis with microperforation and abscess in 2019-treated conservatively with IVF and bowel rest  Continue IV Zosyn  We will discharge patient on Augmentin to complete total of 10 days of treatment on discharge.  "

## 2025-04-08 NOTE — PROGRESS NOTES
"Progress Note - Hospitalist   Name: Luis Powell 54 y.o. male I MRN: [id number]  Unit/Bed#: -01 I Date of Admission: 4/5/2025   Date of Service: 4/8/2025 I Hospital Day: 2    Assessment & Plan  Epigastric pain  Episodic epigastric AP that onset 4/4 at 0100 with associated nausea and vomiting  CT A/P: \"There is significant inflammatory change within the mesentery of the upper abdomen in the region of the distal stomach, first and second portion of the duodenum, and a short segment of jejunum. Although this is nonspecific it may possibly be related to ulcer disease.\"  Patient had EGD that showed mild nonerosive esophagitis, moderate erosive gastritis without bleeding biopsied for H. pylori, mild to moderate duodenitis of the bulb  4/7 evening patient reported severe epigastric pain repeat CTA chest abdomen pelvis did not show any acute findings, improvement in the last duodenitis, slightly improved compared to the CT the day before.  Patient's pain is on xiphoid process, severe.  Patient and his trying to vomit when he has the pain.  Discussed with gastroenterology, this pain is not GI related.?  Muscle skeletal pain given patient had episodes of vomiting at home.  Discussed with vascular surgery who reviewed patient's imaging.  Per vascular surgery if the pain is persistent weeks after treatment for gastritis patient can follow-up with general surgery/IR for celiac block if there is concern for MAL as from celiac plexus being compressed causing issues, however less likely per surgery.  No acute intervention noted from vascular surgery.  Also patient has celiac occlusion with collaterals suggesting chronicity of chronic celiac occlusion.  Mesenteric duplex showed greater than 70% celiac stenosis no significant stenosis in SMA and ANNA.  Will start the patient on ketorolac as needed for for pain, as needed Xanax for anxiety  Continue PPI bid, pepcid and carafate   Likely discharge 24 hours.  Celiac artery stenosis " "(HCC)  CT A/P: \"There is either high-grade stenosis versus occlusion of the proximal celiac axis spanning a length of approximately 12 mm. The distal celiac axis and its branches are reconstituted via collaterals. 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 There is anastomosis between the ANNA and SMA (arc of Riolan variant).  \"  Mesenteric duplex showed greater than 70% celiac stenosis no significant stenosis in SMA and ANNA.   Per vascular surgery symptoms are unlikely related to chronic celiac occlusion.  He is celiac occlusion may be secondary to median arcuate ligament compression given J-shaped appearance.  Per surgery if symptoms persist after inflammation is resolved could consider outpatient referral for median arcuate ligament syndrome to general surgery.  Will start aspirin and atorvastatin per surgery recommendation given chronic occlusion  Patient will follow-up with vascular surgery outpatient for surveillance of  artery stenosis.  Sigmoid diverticulitis  CT A/P: \"Mild acute sigmoid diverticulitis. \"  History of sigmoid diverticulitis with microperforation and abscess in 2019-treated conservatively with IVF and bowel rest  Continue IV Zosyn  We will discharge patient on Augmentin to complete total of 10 days of treatment on discharge.  Leukocytosis  Recent Labs     04/07/25  0321 04/07/25  1838 04/08/25  0429   WBC 11.81* 16.40* 13.90*      WBC 16.62K on admission suspect secondary to acute diverticulitis  Continue IV Zosyn  Alcohol abuse  Drink 7-8 beers every other day   CIWA protocol  Last drink 4/5   Chronic left-sided low back pain with left-sided sciatica  Continue home tizanidine 4 Mg at bedtime    VTE Pharmacologic Prophylaxis: VTE Score: 3 Moderate Risk (Score 3-4) - Pharmacological DVT Prophylaxis Ordered: enoxaparin (Lovenox). Is refusing it     Mobility:   Basic Mobility Inpatient Raw Score: 24  JH-HLM Goal: 8: Walk 250 feet or more  JH-HLM Achieved: 7: Walk 25 feet or " more  JH-HLM Goal achieved. Continue to encourage appropriate mobility.    Patient Centered Rounds: I performed bedside rounds with nursing staff today.   Discussions with Specialists or Other Care Team Provider: cm, GI, surgery, vasc surgery     Education and Discussions with Family / Patient: Patient declined call to .     Current Length of Stay: 2 day(s)  Current Patient Status: Inpatient   Certification Statement: The patient will continue to require additional inpatient hospital stay due to pain   Discharge Plan: Anticipate discharge tomorrow to home.    Code Status: Level 1 - Full Code    Subjective     Intermittent xiphoid process pain that severe in nature.  Patient reported when he has the pain he tries to vomit to ease the pain.  He does not have any nausea.  He feels flushed.  Is not related to meals or anything in particular.    Objective :  Temp:  [97.5 °F (36.4 °C)-98.3 °F (36.8 °C)] 98.2 °F (36.8 °C)  HR:  [49-69] 69  BP: (136-217)/() 168/83  Resp:  [12-20] 20  SpO2:  [96 %-99 %] 96 %  O2 Device: None (Room air)    Body mass index is 35.18 kg/m².     Input and Output Summary (last 24 hours):     Intake/Output Summary (Last 24 hours) at 4/8/2025 1551  Last data filed at 4/8/2025 1333  Gross per 24 hour   Intake 4362.5 ml   Output 2600 ml   Net 1762.5 ml       Physical Exam  Vitals and nursing note reviewed.   Constitutional:       General: He is not in acute distress.     Appearance: He is not diaphoretic.   HENT:      Head: Normocephalic.   Eyes:      General:         Right eye: No discharge.         Left eye: No discharge.   Cardiovascular:      Rate and Rhythm: Normal rate and regular rhythm.   Pulmonary:      Effort: Pulmonary effort is normal. No respiratory distress.      Breath sounds: Normal breath sounds. No wheezing, rhonchi or rales.   Abdominal:      General: There is no distension.      Palpations: Abdomen is soft.      Tenderness: There is no abdominal tenderness.  There is no guarding or rebound.   Musculoskeletal:      Cervical back: Normal range of motion.      Right lower leg: No edema.      Left lower leg: No edema.   Skin:     General: Skin is warm.   Neurological:      Mental Status: He is alert and oriented to person, place, and time.   Psychiatric:         Mood and Affect: Mood normal.         Behavior: Behavior normal.           Lines/Drains:              Lab Results: I have reviewed the following results:   Results from last 7 days   Lab Units 04/08/25  0429 04/07/25  0321 04/06/25  0020   WBC Thousand/uL 13.90*   < > 16.62*   HEMOGLOBIN g/dL 16.3   < > 17.8*   HEMATOCRIT % 49.3   < > 54.6*   PLATELETS Thousands/uL 181   < > 234   SEGS PCT %  --   --  69   LYMPHO PCT %  --   --  19   MONO PCT %  --   --  10   EOS PCT %  --   --  1    < > = values in this interval not displayed.     Results from last 7 days   Lab Units 04/08/25  0429 04/07/25  1838   SODIUM mmol/L 137 136   POTASSIUM mmol/L 4.5 4.1   CHLORIDE mmol/L 104 104   CO2 mmol/L 29 22   BUN mg/dL 17 21   CREATININE mg/dL 1.00 0.99   ANION GAP mmol/L 4 10   CALCIUM mg/dL 8.5 9.0   ALBUMIN g/dL  --  4.2   TOTAL BILIRUBIN mg/dL  --  0.62   ALK PHOS U/L  --  75   ALT U/L  --  40   AST U/L  --  13   GLUCOSE RANDOM mg/dL 132 132                 Results from last 7 days   Lab Units 04/07/25  2054 04/07/25  1838 04/06/25  0124   LACTIC ACID mmol/L 0.9 2.1* 1.2       Recent Cultures (last 7 days):         Imaging Results Review: I reviewed radiology reports from this admission including: CT chest, CT abdomen/pelvis, and Ultrasound(s).  Other Study Results Review: No additional pertinent studies reviewed.    Last 24 Hours Medication List:     Current Facility-Administered Medications:     acetaminophen (TYLENOL) tablet 650 mg, Q6H PRN    ALPRAZolam (XANAX) tablet 0.25 mg, BID PRN    diphenhydrAMINE (BENADRYL) tablet 50 mg, HS PRN    enoxaparin (LOVENOX) subcutaneous injection 40 mg, Daily    famotidine (PEPCID) tablet  40 mg, HS    HYDROmorphone (DILAUDID) injection 0.5 mg, Q6H PRN    ketorolac (TORADOL) injection 15 mg, Q6H PRN    nicotine (NICODERM CQ) 21 mg/24 hr TD 24 hr patch 1 patch, Daily    ondansetron (ZOFRAN) injection 4 mg, Q4H PRN    oxyCODONE (ROXICODONE) IR tablet 5 mg, Q6H PRN    oxyCODONE (ROXICODONE) split tablet 2.5 mg, Q6H PRN    pantoprazole (PROTONIX) injection 40 mg, Q12H LINDA    piperacillin-tazobactam (ZOSYN) IVPB (EXTENDED INFUSION) 4.5 g, Q8H, Last Rate: 25 mL/hr at 04/07/25 0321    sucralfate (CARAFATE) tablet 1 g, 4x Daily (AC & HS)    tiZANidine (ZANAFLEX) tablet 4 mg, HS    Administrative Statements   Today, Patient Was Seen By: Zulma Joseph MD      **Please Note: This note may have been constructed using a voice recognition system.**

## 2025-04-08 NOTE — ASSESSMENT & PLAN NOTE
"Episodic epigastric AP that onset 4/4 at 0100 with associated nausea and vomiting  CT A/P: \"There is significant inflammatory change within the mesentery of the upper abdomen in the region of the distal stomach, first and second portion of the duodenum, and a short segment of jejunum. Although this is nonspecific it may possibly be related to ulcer disease.\"  Patient had EGD that showed mild nonerosive esophagitis, moderate erosive gastritis without bleeding biopsied for H. pylori, mild to moderate duodenitis of the bulb  4/7 evening patient reported severe epigastric pain repeat CTA chest abdomen pelvis did not show any acute findings, improvement in the last duodenitis, slightly improved compared to the CT the day before.  Patient's pain is on xiphoid process, severe.  Patient and his trying to vomit when he has the pain.  Discussed with gastroenterology, this pain is not GI related.?  Muscle skeletal pain given patient had episodes of vomiting at home.  Discussed with vascular surgery who reviewed patient's imaging.  Per vascular surgery if the pain is persistent weeks after treatment for gastritis patient can follow-up with general surgery/IR for celiac block if there is concern for MAL as from celiac plexus being compressed causing issues, however less likely per surgery.  No acute intervention noted from vascular surgery.  Also patient has celiac occlusion with collaterals suggesting chronicity of chronic celiac occlusion.  Mesenteric duplex showed greater than 70% celiac stenosis no significant stenosis in SMA and ANNA.  Will start the patient on ketorolac as needed for for pain, as needed Xanax for anxiety  Continue PPI bid, pepcid and carafate   Likely discharge 24 hours.  "

## 2025-04-08 NOTE — ASSESSMENT & PLAN NOTE
Acute uncomplicated mild sigmoid diverticulitis on CT; lower abdominal pain on exam  Abx: Zosyn started 4/6 ? continue for total 10 days abx  Needs outpatient colonoscopy in 6-8 weeks due to history of 1 cm adenoma in 2019 (overdue for 3 yr colonoscopy recall)

## 2025-04-08 NOTE — ASSESSMENT & PLAN NOTE
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 The distal celiac axis and its branches are reconstituted via collaterals. 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 There is anastomosis between the ANNA and SMA (arc of Riolan variant).  \"  Mesenteric duplex showed greater than 70% celiac stenosis no significant stenosis in SMA and ANNA.   Per vascular surgery symptoms are unlikely related to chronic celiac occlusion.  He is celiac occlusion may be secondary to median arcuate ligament compression given J-shaped appearance.  Per surgery if symptoms persist after inflammation is resolved could consider outpatient referral for median arcuate ligament syndrome to general surgery.  Will start aspirin and atorvastatin per surgery recommendation given chronic occlusion  Patient will follow-up with vascular surgery outpatient for surveillance of  artery stenosis.  "

## 2025-04-08 NOTE — ASSESSMENT & PLAN NOTE
54-year-old male with hypertension, chronic back pain, alcohol abuse, tobacco use here with epigastric pain and CT findings of celiac artery stenosis, inflammatory changes of the upper abdomen indicating possible posterior peptic ulcer, uncomplicated sigmoid diverticulitis.    CTA 4/6: There is significant inflammatory change within the mesentery of the upper abdomen in the region of the distal stomach, first and second portion of the duodenum, and a short segment of jejunum. Although this is nonspecific it may possibly be related to ulcer disease. mild diverticulitis. High grade stenosis vs occlusion of proximal celiac axis for 12 mm. The distal celiac axis and its branches are reconstituted via collaterals. 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     EGD 4/7: mild nonerosive esophagitis, mod erosive gastritis, mild-mod duodenitis of the bulb.  Pepcid added along with PPI, carafate    Patient c/o continuing epigastric pain 4/7 PM  Repeat CT AP 4/7: IMPRESSION: No acute thoracic abnormality identified.  Persistent inflammatory fat stranding in the upper abdomen adjacent to the distal stomach and gastroduodenal junction compatible with gastroduodenitis, slightly improved from the prior recent study dated 4/6/2024. No extraluminal extravasation of contrast or discrete collection identified. Findings again may reflect underlying peptic ulcer disease in conjunction with the findings of recent EGD examination. Clinical correlation and GI follow-up recommended.  Redemonstrated findings of mild acute/subacute sigmoid diverticulitis. No definite extraluminal free air or collection.  Redemonstrated high-grade stenosis/occlusion at the proximal celiac artery and moderate to severe stenosis at the proximal SMA, better evaluated on the prior recent CTA study.    AVSS, WBC 13.90 (16.4), Hb 16.3 (17.7)  UOP 2100/24hr, Cr 1.00  endorses flatus/BM, abdomen soft, non-distended, tender to deep palpation in epigastric  region otherwise non-tender to palpation        Plan:  No surgical intervention at this time  Non-ulcerogenic diet as tolerated   Ordered mesenteric duplex to evaluate alternative etiologies of pain considering continued episodes with treatment of PUD  Continue treatment for PUD: pepcid, PPI, carafate   Other care per primary team     ---Addendum---  Reviewed findings of duplex:   CONCLUSION:  The abdominal aorta is patent and normal in caliber.  Findings suggests a >70% stenosis in the celiac artery.  No hemodynamically significant stenosis is noted in the superior and inferior  mesenteric arteries.    Saw patient in afternoon at 1300 to go over findings of duplex. No surgical intervention indicated. Patient relayed belief that pain is musculoskeletal in nature. General Surgery will sign off on this patient.

## 2025-04-08 NOTE — QUICK NOTE
Ct reviewed with Dr Townsend from Vascular surgery.      Persistent inflammatory fat stranding in the upper abdomen adjacent to the distal stomach and gastroduodenal junction compatible with gastroduodenitis, slightly improved from the prior recent study dated 4/6/2024. No extraluminal extravasation of  contrast or discrete collection identified. Findings again may reflect underlying peptic ulcer disease in conjunction with the findings of recent EGD examination. Clinical correlation and GI follow-up recommended.      Redemonstrated findings of mild acute/subacute sigmoid diverticulitis. No definite extraluminal free air or collection.     Redemonstrated high-grade stenosis/occlusion at the proximal celiac artery and moderate to severe stenosis at the proximal SMA, better evaluated on the prior recent CTA study.    The CTA was reviewed as well. Sma patent,, celiac with reconstitution, chronic occlusion. Celiac artery notable for sharp angulation at the clint of the diaphragm. If there is consistent pain and concern for MALS type symptoms from celiac plexus being compressed causing issues, pt can follow up with IR for celiac block, and then f/u with surgery at [place] ( Dr Garcia). Will await duplex as ordered by Gen surgery,  No acute intervention noted from vascular surgery standpoint.    Gracie Hinojosa

## 2025-04-08 NOTE — PROGRESS NOTES
Progress Note - Surgery-General   Name: Luis Powell 54 y.o. male I MRN: [id number]  Unit/Bed#: -01 I Date of Admission: 4/5/2025   Date of Service: 4/8/2025 I Hospital Day: 2     Assessment & Plan  Epigastric pain  54-year-old male with hypertension, chronic back pain, alcohol abuse, tobacco use here with epigastric pain and CT findings of celiac artery stenosis, inflammatory changes of the upper abdomen indicating possible posterior peptic ulcer, uncomplicated sigmoid diverticulitis.    CTA 4/6: There is significant inflammatory change within the mesentery of the upper abdomen in the region of the distal stomach, first and second portion of the duodenum, and a short segment of jejunum. Although this is nonspecific it may possibly be related to ulcer disease. mild diverticulitis. High grade stenosis vs occlusion of proximal celiac axis for 12 mm. The distal celiac axis and its branches are reconstituted via collaterals. 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     EGD 4/7: mild nonerosive esophagitis, mod erosive gastritis, mild-mod duodenitis of the bulb.  Pepcid added along with PPI, carafate    Patient c/o continuing epigastric pain 4/7 PM  Repeat CT AP 4/7: IMPRESSION: No acute thoracic abnormality identified.  Persistent inflammatory fat stranding in the upper abdomen adjacent to the distal stomach and gastroduodenal junction compatible with gastroduodenitis, slightly improved from the prior recent study dated 4/6/2024. No extraluminal extravasation of contrast or discrete collection identified. Findings again may reflect underlying peptic ulcer disease in conjunction with the findings of recent EGD examination. Clinical correlation and GI follow-up recommended.  Redemonstrated findings of mild acute/subacute sigmoid diverticulitis. No definite extraluminal free air or collection.  Redemonstrated high-grade stenosis/occlusion at the proximal celiac artery and moderate to severe stenosis at  "the proximal SMA, better evaluated on the prior recent CTA study.    AVSS, WBC 13.90 (16.4), Hb 16.3 (17.7)  UOP 2100/24hr, Cr 1.00  endorses flatus/BM, abdomen soft, non-distended, tender to deep palpation in epigastric region otherwise non-tender to palpation        Plan:  No surgical intervention at this time  Non-ulcerogenic diet as tolerated   Ordered mesenteric duplex to evaluate alternative etiologies of pain considering continued episodes with treatment of PUD  Continue treatment for PUD: pepcid, PPI, carafate   Other care per primary team     ---Addendum---  Reviewed findings of duplex:   CONCLUSION:  The abdominal aorta is patent and normal in caliber.  Findings suggests a >70% stenosis in the celiac artery.  No hemodynamically significant stenosis is noted in the superior and inferior  mesenteric arteries.    Saw patient in afternoon at 1300 to go over findings of duplex. No surgical intervention indicated. Patient relayed belief that pain is musculoskeletal in nature. General Surgery will sign off on this patient.     Sigmoid diverticulitis  Uncomplicated, as evidenced by CT scan  [facility]    Celiac artery stenosis (HCC)  Stenosis seen on imaging in 2023.  Unlikely to be source of pain. Mild sma stenosis as well   Plan for outpatient follow-up  Alcohol abuse  [facility] protocol    Leukocytosis  Increased 16.62 from 13 today.  Continue IV antibiotics  Continue to trend  Jarrett afebrile, vital signs stable    Hypertension  - Per SLIM        Subjective   Patient is frustrated that his pain is not being addressed. He says that he has never had \"stomach\" pain before or pain with eating and says that he has pain in his epigastric area that comes on suddenly that is only less severe now because he is on pain medication. He says he has been eating without issues denies nausea/vomiting or changes in bowel function. He denies other issues aside from the sudden episodes of pain.     Objective :  Temp:  " [97.5 °F (36.4 °C)-98.8 °F (37.1 °C)] 98.2 °F (36.8 °C)  HR:  [48-65] 49  BP: (126-217)/() 136/73  Resp:  [12-24] 12  SpO2:  [96 %-99 %] 97 %  O2 Device: None (Room air)    I/O         04/06 0701 04/07 0700 04/07 0701 04/08 0700 04/08 0701  04/09 0700    P.O. 0 880     I.V. (mL/kg) 2675 (24.8) 2568.8 (23.8)     IV Piggyback 200 873.8     Total Intake(mL/kg) 2875 (26.6) 4322.5 (40)     Urine (mL/kg/hr) 1500 (0.6) 2100 (0.8) 0 (0)    Total Output 1500 2100 0    Net +1375 +2222.5 0           Unmeasured Urine Occurrence  2 x             Physical Exam  Vitals and nursing note reviewed.   Constitutional:       General: He is not in acute distress.     Appearance: Normal appearance. He is well-developed. He is obese. He is not ill-appearing, toxic-appearing or diaphoretic.   HENT:      Head: Normocephalic and atraumatic.   Eyes:      General: No scleral icterus.     Conjunctiva/sclera: Conjunctivae normal.   Cardiovascular:      Rate and Rhythm: Normal rate and regular rhythm.      Heart sounds: No murmur heard.  Pulmonary:      Effort: Pulmonary effort is normal. No respiratory distress.      Breath sounds: Normal breath sounds.   Abdominal:      General: There is no distension.      Palpations: Abdomen is soft.      Tenderness: There is no guarding or rebound.      Comments: Tender to deep palpation of epigastric area, otherwise non-tender to palpation    Musculoskeletal:         General: No swelling.      Cervical back: Neck supple.   Skin:     General: Skin is warm and dry.      Capillary Refill: Capillary refill takes less than 2 seconds.   Neurological:      Mental Status: He is alert and oriented to person, place, and time.   Psychiatric:         Mood and Affect: Mood normal.           Lab Results: I have reviewed the following results:  Recent Labs     04/06/25  0020 04/06/25  0124 04/06/25  0218 04/07/25  0321 04/07/25  1838 04/07/25 2054 04/08/25  0429   WBC 16.62*  --   --  11.81* 16.40*  --  13.90*    HGB 17.8*  --   --  15.3 17.7*  --  16.3   HCT 54.6*  --   --  47.5 53.5*  --  49.3     --   --  159 191  --  181   SODIUM 137  --   --  135 136  --  137   K 4.1  --   --  4.4 4.1  --  4.5     --   --  106 104  --  104   CO2 28  --   --  18* 22  --  29   BUN 28*  --   --  17 21  --  17   CREATININE 1.21  --   --  0.80 0.99  --  1.00   GLUC 121  --   --  126 132  --  132   MG  --   --   --  2.4  --   --   --    PHOS  --   --   --  2.9  --   --   --    AST 45*  --   --  13 13  --   --    ALT 78*  --   --  36 40  --   --    ALB 4.5  --   --  3.7 4.2  --   --    TBILI 0.51  --   --  1.01* 0.62  --   --    ALKPHOS 87  --   --  54 75  --   --    HSTNI0 6  --   --   --   --   --   --    HSTNI2  --   --  6  --   --   --   --    LACTICACID  --    < >  --   --  2.1* 0.9  --     < > = values in this interval not displayed.             VTE Pharmacologic Prophylaxis: VTE covered by:  enoxaparin, Subcutaneous     VTE Mechanical Prophylaxis: sequential compression device

## 2025-04-08 NOTE — CASE MANAGEMENT
Case Management Discharge Planning Note    Patient name Luis Powell  Location /-01 MRN [id number]  : 1970 Date 2025       Current Admission Date: 2025  Current Admission Diagnosis:Epigastric pain   Patient Active Problem List    Diagnosis Date Noted Date Diagnosed    Hypertension      Epigastric pain 2025     Celiac artery stenosis (HCC) 2025     Sigmoid diverticulitis 2025     Alcohol abuse 2025     Leukocytosis 2025     Intervertebral disc disorders with radiculopathy, lumbosacral region 2023     Mixed erectile dysfunction 2021     Splitting of urinary stream 2021     Left hip pain      Chronic pain syndrome 2019     Primary osteoarthritis of left hip 2019     Lumbar spondylosis      Sacroiliitis (HCC)      Chronic left-sided low back pain with left-sided sciatica 2019     S/P lumbar fusion 2019     Diverticulitis of large intestine with perforation and abscess without bleeding 2019       LOS (days): 2  Geometric Mean LOS (GMLOS) (days):   Days to GMLOS:     OBJECTIVE:  Risk of Unplanned Readmission Score: 7.25         Current admission status: Inpatient   Preferred Pharmacy:   [facility] - [place] - [address]  [address]  [place] [address]  Phone: [phone] Fax: [phone]    Primary Care Provider: Adamaris Grace MD    Primary Insurance: [facility]  Secondary Insurance: [employer]  REP    DISCHARGE DETAILS:                                                                                        IMM Given (Date):: 25  IMM Given to:: Patient        IMM reviewed with pt at bedside. Pt is aware of right to appeal hospital discharge.

## 2025-04-08 NOTE — ASSESSMENT & PLAN NOTE
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 The distal celiac axis and its branches are reconstituted via collaterals. 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 There is anastomosis between the ANNA and SMA (arc of Riolan variant).  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 No acute vascular surgery intervention warranted at this time, low suspicion for mesenteric ischemia or for celiac axis stenosis to be source of pt's pain given that he is not having any abdominal pain nor change in sx with eating/drinking

## 2025-04-08 NOTE — CASE MANAGEMENT
Case Management Discharge Planning Note    Patient name Luis Powell  Location /-01 MRN [id number]  : 1970 Date 2025       Current Admission Date: 2025  Current Admission Diagnosis:Epigastric pain   Patient Active Problem List    Diagnosis Date Noted Date Diagnosed    Hypertension      Epigastric pain 2025     Celiac artery stenosis (HCC) 2025     Sigmoid diverticulitis 2025     Alcohol abuse 2025     Leukocytosis 2025     Intervertebral disc disorders with radiculopathy, lumbosacral region 2023     Mixed erectile dysfunction 2021     Splitting of urinary stream 2021     Left hip pain      Chronic pain syndrome 2019     Primary osteoarthritis of left hip 2019     Lumbar spondylosis      Sacroiliitis (HCC)      Chronic left-sided low back pain with left-sided sciatica 2019     S/P lumbar fusion 2019     Diverticulitis of large intestine with perforation and abscess without bleeding 2019       LOS (days): 2  Geometric Mean LOS (GMLOS) (days):   Days to GMLOS:     OBJECTIVE:  Risk of Unplanned Readmission Score: 6.74         Current admission status: Inpatient   Preferred Pharmacy:   [facility] - [place] - [address]  [address]  [place] [address]  Phone: [phone] Fax: [phone]    Primary Care Provider: Adamaris Grace MD    Primary Insurance: [facility]  Secondary Insurance: [employer] REP    DISCHARGE DETAILS:                                          Other Referral/Resources/Interventions Provided:  Interventions: Other (Specify)  Referral Comments: Pt is being prescribed new meds at discharged. [phone], and made request URGENT, asking that meds be expedited, delivery next day. Spoke with pt. He stated that [facility] and can get meds filled same day.  Stated that [facility], and [facility] in [place] where he picks them up, free of charge. CM reviewed that most meds are over the counter. [facility].     [facility]     Treatment Team Recommendation: Home  Discharge Destination Plan:: Home

## 2025-04-08 NOTE — PLAN OF CARE
Problem: PAIN - ADULT  Goal: Verbalizes/displays adequate comfort level or baseline comfort level  Description: Interventions:- Encourage patient to monitor pain and request assistance- Assess pain using appropriate pain scale- Administer analgesics based on type and severity of pain and evaluate response- Implement non-pharmacological measures as appropriate and evaluate response- Consider cultural and social influences on pain and pain management- Notify physician/advanced practitioner if interventions unsuccessful or patient reports new pain  Outcome: Progressing     Problem: INFECTION - ADULT  Goal: Absence or prevention of progression during hospitalization  Description: INTERVENTIONS:- Assess and monitor for signs and symptoms of infection- Monitor lab/diagnostic results- Monitor all insertion sites, i.e. indwelling lines, tubes, and drains- Monitor endotracheal if appropriate and nasal secretions for changes in amount and color- Kathryn appropriate cooling/warming therapies per order- Administer medications as ordered- Instruct and encourage patient and family to use good hand hygiene technique- Identify and instruct in appropriate isolation precautions for identified infection/condition  Outcome: Progressing  Goal: Absence of fever/infection during neutropenic period  Description: INTERVENTIONS:- Monitor WBC  Outcome: Progressing     Problem: SAFETY ADULT  Goal: Patient will remain free of falls  Description: INTERVENTIONS:- Educate patient/family on patient safety including physical limitations- Instruct patient to call for assistance with activity - Consult OT/PT to assist with strengthening/mobility - Keep Call bell within reach- Keep bed low and locked with side rails adjusted as appropriate- Keep care items and personal belongings within reach- Initiate and maintain comfort rounds- Make Fall Risk Sign visible to staff- Offer Toileting every 2 Hours, in advance of need- Initiate/Maintain bed alarm-  Obtain necessary fall risk management equipment: socks- Apply yellow socks and bracelet for high fall risk patients- Consider moving patient to room near nurses station  Outcome: Progressing  Goal: Maintain or return to baseline ADL function  Description: INTERVENTIONS:-  Assess patient's ability to carry out ADLs; assess patient's baseline for ADL function and identify physical deficits which impact ability to perform ADLs (bathing, care of mouth/teeth, toileting, grooming, dressing, etc.)- Assess/evaluate cause of self-care deficits - Assess range of motion- Assess patient's mobility; develop plan if impaired- Assess patient's need for assistive devices and provide as appropriate- Encourage maximum independence but intervene and supervise when necessary- Involve family in performance of ADLs- Assess for home care needs following discharge - Consider OT consult to assist with ADL evaluation and planning for discharge- Provide patient education as appropriate  Outcome: Progressing  Goal: Maintains/Returns to pre admission functional level  Description: INTERVENTIONS:- Perform AM-PAC 6 Click Basic Mobility/ Daily Activity assessment daily.- Set and communicate daily mobility goal to care team and patient/family/caregiver. - Collaborate with rehabilitation services on mobility goals if consulted- Perform Range of Motion 2 times a day.- Reposition patient every 2 hours.- Dangle patient 2 times a day- Stand patient 2 times a day- Ambulate patient 2 times a day- Out of bed to chair 2 times a day - Out of bed for meals 2 times a day- Out of bed for toileting- Record patient progress and toleration of activity level   Outcome: Progressing     Problem: DISCHARGE PLANNING  Goal: Discharge to home or other facility with appropriate resources  Description: INTERVENTIONS:- Identify barriers to discharge w/patient and caregiver- Arrange for needed discharge resources and transportation as appropriate- Identify discharge learning  needs (meds, wound care, etc.)- Arrange for interpretive services to assist at discharge as needed- Refer to Case Management Department for coordinating discharge planning if the patient needs post-hospital services based on physician/advanced practitioner order or complex needs related to functional status, cognitive ability, or social support system  Outcome: Progressing     Problem: Knowledge Deficit  Goal: Patient/family/caregiver demonstrates understanding of disease process, treatment plan, medications, and discharge instructions  Description: Complete learning assessment and assess knowledge base.Interventions:- Provide teaching at level of understanding- Provide teaching via preferred learning methods  Outcome: Progressing     Problem: GASTROINTESTINAL - ADULT  Goal: Minimal or absence of nausea and/or vomiting  Description: INTERVENTIONS:- Administer IV fluids if ordered to ensure adequate hydration- Maintain NPO status until nausea and vomiting are resolved- Nasogastric tube if ordered- Administer ordered antiemetic medications as needed- Provide nonpharmacologic comfort measures as appropriate- Advance diet as tolerated, if ordered- Consider nutrition services referral to assist patient with adequate nutrition and appropriate food choices  Outcome: Progressing  Goal: Maintains or returns to baseline bowel function  Description: INTERVENTIONS:- Assess bowel function- Encourage oral fluids to ensure adequate hydration- Administer IV fluids if ordered to ensure adequate hydration- Administer ordered medications as needed- Encourage mobilization and activity- Consider nutritional services referral to assist patient with adequate nutrition and appropriate food choices  Outcome: Progressing  Goal: Maintains adequate nutritional intake  Description: INTERVENTIONS:- Monitor percentage of each meal consumed- Identify factors contributing to decreased intake, treat as appropriate- Assist with meals as needed-  Monitor I&O, weight, and lab values if indicated- Obtain nutrition services referral as needed  Outcome: Progressing  Goal: Establish and maintain optimal ostomy function  Description: INTERVENTIONS:- Assess bowel function- Encourage oral fluids to ensure adequate hydration- Administer IV fluids if ordered to ensure adequate hydration - Administer ordered medications as needed- Encourage mobilization and activity- Nutrition services referral to assist patient with appropriate food choices- Assess stoma site- Consider wound care consult   Outcome: Progressing  Goal: Oral mucous membranes remain intact  Description: INTERVENTIONS- Assess oral mucosa and hygiene practices- Implement preventative oral hygiene regimen- Implement oral medicated treatments as ordered- Initiate Nutrition services referral as needed  Outcome: Progressing

## 2025-04-08 NOTE — QUICK NOTE
"  CRNP alerted by nursing staff due to patient having increased epigastric pain.  Per patient, he could describes that pain is midline just below the xiphoid process and feels like it is a \"ball-like\" hardened area underneath rib cage.  The patient expressing that when this occurs, he feels very flushed and with increased shortness of breath symptoms.    CRNP reached out to surgery PA, as patient with recurring symptoms s/p EGD earlier today.  Per EGD results, showing duodenitis near the bulb, mild nonerosive esophagitis, and moderate erosive gastritis without bleeding.  Patient was started on oral diet, Carafate with meals, and PPI twice daily.      CTA dissection study (4/6): \"There is significant inflammatory change within the mesentery of the upper abdomen in the region of the distal stomach, first and second portion of the duodenum, and a short segment of jejunum. Although this is nonspecific it may possibly be related to   ulcer disease. Surgical consultation and Gastroenterology consultation is recommended.Mild acute sigmoid diverticulitis.There is either high-grade stenosis versus occlusion of the proximal celiac axis spanning a length of approximately 12 mm. The distal celiac axis and its branches are reconstituted via collaterals. 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 There is anastomosis between the ANNA and SMA (arc of Riolan variant).  No aortic dissection or intramural hematoma. No aortic aneurysm. Atherosclerosis. Coronary artery disease.\"       Physical Exam  Vitals reviewed.   Constitutional:       General: He is awake. He is in acute distress.      Appearance: He is not ill-appearing.   Cardiovascular:      Rate and Rhythm: Normal rate.      Heart sounds: Normal heart sounds, S1 normal and S2 normal. No murmur heard.  Chest:      Chest wall: No mass or tenderness.   Abdominal:      General: Bowel sounds are normal. There is no distension.      Palpations: Abdomen is soft.      " Tenderness: There is abdominal tenderness in the epigastric area.      Comments: On exam, patient is tender in midline epigastric area just below xiphoid process. On exam, small hardened area able to be palpated.    Neurological:      Mental Status: He is alert.       Management/Plan:   Per surgery, okay to repeat CTA abdomen/pelvis.  Will order CTA c/a/p due to location of pain.   Will need to follow results.  Will order IV bolus of LR due to elevation of lactic acid (2.1).   Will reflex lactic acid to reevaluate level.  If needed, can increase fluids or give additional bolus.  Also, noted elevation of WBC count (16.40), but patient is currently on IV Zosyn.   Will continue IV Zosyn at this time.    Continue to monitor serial abdominal exams.    Continue pain control, as needed.  It is likely that patient will need vascular surgery consultation, but will ultimately need further discussion and results of CT.

## 2025-04-09 VITALS
BODY MASS INDEX: 35.28 KG/M2 | WEIGHT: 238.2 LBS | DIASTOLIC BLOOD PRESSURE: 82 MMHG | TEMPERATURE: 97.8 F | HEART RATE: 48 BPM | OXYGEN SATURATION: 97 % | SYSTOLIC BLOOD PRESSURE: 150 MMHG | HEIGHT: 69 IN | RESPIRATION RATE: 16 BRPM

## 2025-04-09 LAB
ANION GAP SERPL CALCULATED.3IONS-SCNC: 5 MMOL/L (ref 4–13)
BUN SERPL-MCNC: 22 MG/DL (ref 5–25)
CALCIUM SERPL-MCNC: 8.9 MG/DL (ref 8.4–10.2)
CHLORIDE SERPL-SCNC: 102 MMOL/L (ref 96–108)
CO2 SERPL-SCNC: 30 MMOL/L (ref 21–32)
CREAT SERPL-MCNC: 1.13 MG/DL (ref 0.6–1.3)
ERYTHROCYTE [DISTWIDTH] IN BLOOD BY AUTOMATED COUNT: 14.5 % (ref 11.6–15.1)
GFR SERPL CREATININE-BSD FRML MDRD: 73 ML/MIN/1.73SQ M
GLUCOSE SERPL-MCNC: 114 MG/DL (ref 65–140)
HCT VFR BLD AUTO: 50.4 % (ref 36.5–49.3)
HGB BLD-MCNC: 16.4 G/DL (ref 12–17)
MCH RBC QN AUTO: 28.4 PG (ref 26.8–34.3)
MCHC RBC AUTO-ENTMCNC: 32.5 G/DL (ref 31.4–37.4)
MCV RBC AUTO: 87 FL (ref 82–98)
PLATELET # BLD AUTO: 172 THOUSANDS/UL (ref 149–390)
PMV BLD AUTO: 11.6 FL (ref 8.9–12.7)
POTASSIUM SERPL-SCNC: 4.5 MMOL/L (ref 3.5–5.3)
RBC # BLD AUTO: 5.77 MILLION/UL (ref 3.88–5.62)
SODIUM SERPL-SCNC: 137 MMOL/L (ref 135–147)
WBC # BLD AUTO: 12.12 THOUSAND/UL (ref 4.31–10.16)

## 2025-04-09 PROCEDURE — 80048 BASIC METABOLIC PNL TOTAL CA: CPT | Performed by: INTERNAL MEDICINE

## 2025-04-09 PROCEDURE — 85027 COMPLETE CBC AUTOMATED: CPT | Performed by: INTERNAL MEDICINE

## 2025-04-09 PROCEDURE — 99239 HOSP IP/OBS DSCHRG MGMT >30: CPT | Performed by: INTERNAL MEDICINE

## 2025-04-09 RX ORDER — OXYCODONE HYDROCHLORIDE 5 MG/1
5 TABLET ORAL EVERY 6 HOURS PRN
Qty: 5 TABLET | Refills: 0 | Status: SHIPPED | OUTPATIENT
Start: 2025-04-09 | End: 2025-04-14

## 2025-04-09 RX ORDER — FAMOTIDINE 40 MG/1
40 TABLET, FILM COATED ORAL
Qty: 30 TABLET | Refills: 0 | Status: SHIPPED | OUTPATIENT
Start: 2025-04-09

## 2025-04-09 RX ORDER — SUCRALFATE 1 G/1
1 TABLET ORAL
Qty: 120 TABLET | Refills: 0 | Status: SHIPPED | OUTPATIENT
Start: 2025-04-09

## 2025-04-09 RX ORDER — ASPIRIN 81 MG/1
81 TABLET ORAL DAILY
Qty: 30 TABLET | Refills: 0 | Status: SHIPPED | OUTPATIENT
Start: 2025-04-10 | End: 2025-05-10

## 2025-04-09 RX ORDER — PANTOPRAZOLE SODIUM 40 MG/1
40 TABLET, DELAYED RELEASE ORAL 2 TIMES DAILY
Qty: 84 TABLET | Refills: 0 | Status: SHIPPED | OUTPATIENT
Start: 2025-04-09 | End: 2025-05-21

## 2025-04-09 RX ORDER — OXYCODONE HYDROCHLORIDE 5 MG/1
5 TABLET ORAL EVERY 4 HOURS PRN
Qty: 5 TABLET | Refills: 0 | Status: SHIPPED | OUTPATIENT
Start: 2025-04-09 | End: 2025-04-09

## 2025-04-09 RX ORDER — ATORVASTATIN CALCIUM 40 MG/1
40 TABLET, FILM COATED ORAL
Qty: 30 TABLET | Refills: 0 | Status: SHIPPED | OUTPATIENT
Start: 2025-04-09 | End: 2025-05-09

## 2025-04-09 RX ADMIN — ASPIRIN 81 MG: 81 TABLET, COATED ORAL at 08:08

## 2025-04-09 RX ADMIN — OXYCODONE HYDROCHLORIDE 5 MG: 5 TABLET ORAL at 04:34

## 2025-04-09 RX ADMIN — SUCRALFATE 1 G: 1 TABLET ORAL at 07:32

## 2025-04-09 RX ADMIN — PANTOPRAZOLE SODIUM 40 MG: 40 INJECTION, POWDER, FOR SOLUTION INTRAVENOUS at 08:08

## 2025-04-09 RX ADMIN — NICOTINE 1 PATCH: 21 PATCH, EXTENDED RELEASE TRANSDERMAL at 08:08

## 2025-04-09 RX ADMIN — PIPERACILLIN AND TAZOBACTAM 4.5 G: 4; .5 INJECTION, POWDER, FOR SOLUTION INTRAVENOUS at 04:26

## 2025-04-09 RX ADMIN — KETOROLAC TROMETHAMINE 15 MG: 30 INJECTION, SOLUTION INTRAMUSCULAR at 00:09

## 2025-04-09 RX ADMIN — ENOXAPARIN SODIUM 40 MG: 40 INJECTION SUBCUTANEOUS at 08:09

## 2025-04-09 RX ADMIN — SUCRALFATE 1 G: 1 TABLET ORAL at 12:25

## 2025-04-09 RX ADMIN — PIPERACILLIN AND TAZOBACTAM 4.5 G: 4; .5 INJECTION, POWDER, FOR SOLUTION INTRAVENOUS at 08:08

## 2025-04-09 RX ADMIN — KETOROLAC TROMETHAMINE 15 MG: 30 INJECTION, SOLUTION INTRAMUSCULAR at 08:08

## 2025-04-09 RX ADMIN — OXYCODONE HYDROCHLORIDE 5 MG: 5 TABLET ORAL at 12:25

## 2025-04-09 NOTE — ASSESSMENT & PLAN NOTE
Recent Labs     04/07/25  1838 04/08/25  0429 04/09/25  0419   WBC 16.40* 13.90* 12.12*      WBC 16.62K on admission suspect secondary to acute diverticulitis  On abx

## 2025-04-09 NOTE — DISCHARGE SUMMARY
"Discharge Summary - Hospitalist   Name: Luis Powell 54 y.o. male I MRN: [id number]  Unit/Bed#: -01 I Date of Admission: 4/5/2025   Date of Service: 4/9/2025 I Hospital Day: 3     Assessment & Plan  Epigastric pain  Episodic epigastric AP that onset 4/4 at 0100 with associated nausea and vomiting  CT A/P: \"There is significant inflammatory change within the mesentery of the upper abdomen in the region of the distal stomach, first and second portion of the duodenum, and a short segment of jejunum. Although this is nonspecific it may possibly be related to ulcer disease.\"  Patient had EGD that showed mild nonerosive esophagitis, moderate erosive gastritis without bleeding biopsied for H. pylori, mild to moderate duodenitis of the bulb  4/7 evening patient reported severe epigastric pain repeat CTA chest abdomen pelvis did not show any acute findings, improvement in the last duodenitis, slightly improved compared to the CT the day before.  Patient's pain is on xiphoid process, severe.  Patient and his trying to vomit when he has the pain.  Discussed with gastroenterology, this pain is not GI related.?  Muscle skeletal pain given patient had episodes of vomiting at home.  Discussed with vascular surgery who reviewed patient's imaging.  Per vascular surgery if the pain is persistent weeks after treatment for gastritis patient can follow-up with general surgery/IR for celiac block if there is concern for MAL as from celiac plexus being compressed causing issues, however less likely per surgery.  No acute intervention noted from vascular surgery.  Also patient has celiac occlusion with collaterals suggesting chronicity of chronic celiac occlusion.  Mesenteric duplex showed greater than 70% celiac stenosis no significant stenosis in SMA and ANNA.  Continue PPI bid for 6 weeks , pepcid and carafate   Improved   Celiac artery stenosis (HCC)  CT A/P: \"There is either high-grade stenosis versus occlusion of the proximal " "celiac axis spanning a length of approximately 12 mm. The distal celiac axis and its branches are reconstituted via collaterals. 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 There is anastomosis between the ANNA and SMA (arc of Riolan variant).  \"  Mesenteric duplex showed greater than 70% celiac stenosis no significant stenosis in SMA and ANNA.   Per vascular surgery symptoms are unlikely related to chronic celiac occlusion.  He is celiac occlusion may be secondary to median arcuate ligament compression given J-shaped appearance.  Per surgery if symptoms persist after inflammation is resolved could consider outpatient referral for median arcuate ligament syndrome to general surgery.  Dc on  aspirin and atorvastatin per surgery recommendation given chronic occlusion  Patient will follow-up with vascular surgery outpatient for surveillance of  artery stenosis.  Sigmoid diverticulitis  CT A/P: \"Mild acute sigmoid diverticulitis. \"  History of sigmoid diverticulitis with microperforation and abscess in 2019-treated conservatively with IVF and bowel rest  Continue IV Zosyn  We will discharge patient on Augmentin to complete total of 10 days of treatment on discharge.  Leukocytosis  Recent Labs     04/07/25  1838 04/08/25  0429 04/09/25  0419   WBC 16.40* 13.90* 12.12*      WBC 16.62K on admission suspect secondary to acute diverticulitis  On abx  Alcohol abuse  Drink 7-8 beers every other day   CIWA protocol  Last drink 4/5   Chronic left-sided low back pain with left-sided sciatica  Continue home tizanidine 4 Mg at bedtime     Medical Problems       Resolved Problems  Date Reviewed: 3/19/2025          Resolved    Elevated LFTs 4/7/2025     Resolved by  Zulma Joseph MD        Discharging Physician / Practitioner: Zulma Joseph MD  PCP: Adamaris Grace MD  Admission Date:   Admission Orders (From admission, onward)       Ordered        04/06/25 0422  INPATIENT ADMISSION  Once              "             Discharge Date: 04/09/25    Consultations During Hospital Stay:  GI,, vascular surgery, surgery    Procedures Performed:   EGD    Significant Findings / Test Results:   CT chest abdomen pelvis w contrast  Result Date: 4/7/2025  Impression: No acute thoracic abnormality identified. Persistent inflammatory fat stranding in the upper abdomen adjacent to the distal stomach and gastroduodenal junction compatible with gastroduodenitis, slightly improved from the prior recent study dated 4/6/2024. No extraluminal extravasation of contrast or discrete collection identified. Findings again may reflect underlying peptic ulcer disease in conjunction with the findings of recent EGD examination. Clinical correlation and GI follow-up recommended. Redemonstrated findings of mild acute/subacute sigmoid diverticulitis. No definite extraluminal free air or collection. Redemonstrated high-grade stenosis/occlusion at the proximal celiac artery and moderate to severe stenosis at the proximal SMA, better evaluated on the prior recent CTA study.   EGD  Result Date: 4/7/2025  Impression: Mild nonerosive esophagitis Moderate erosive gastritis without bleeding biopsied for H. Pylori Mild to moderate duodenitis of the bulb, biopsies obtained RECOMMENDATION: Resume diet Await biopsies Continue twice daily PPI, Carafate before meals.  I added Pepcid at bedtime If intermittent abdominal pain persists despite treatment of gastritis and duodenitis, will discuss HIDA with surgical service       US right upper quadrant  Result Date: 4/6/2025  Impression: Hepatic steatosis. Otherwise, no significant abnormality.     CTA dissection protocol chest/abdomen/pelvis  Result Date: 4/6/2025  Impression: There is significant inflammatory change within the mesentery of the upper abdomen in the region of the distal stomach, first and second portion of the duodenum, and a short segment of jejunum. Although this is nonspecific it may possibly be  "related to ulcer disease. Surgical consultation and Gastroenterology consultation is recommended. Mild acute sigmoid diverticulitis. There is either high-grade stenosis versus occlusion of the proximal celiac axis spanning a length of approximately 12 mm. The distal celiac axis and its branches are reconstituted via collaterals. 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  There is anastomosis between the ANNA and SMA (arc of Riolan variant). No aortic dissection or intramural hematoma. No aortic aneurysm. Atherosclerosis. Coronary artery disease.     Incidental Findings:   none     Test Results Pending at Discharge (will require follow up):   Biopsies      Outpatient Tests Requested:  none    Complications:  none    Reason for Admission: epigastric pain     Hospital Course:   Luis Powell is a 54 y.o. male patient with history of alcohol abuse, spinal fusion and chronic pain, who originally presented to the hospital on 4/5/2025 due to epigastric pain.  Patient had EGD that showed esophagitis, gastritis and duodenitis, was started on PPI twice daily, Carafate and Pepcid.      Surgery and vascular surgery also on board, no intervention was indicated it was thought that his pain was GI related versus MSK.    Patient stable for discharge.  Follow-up with PCP and vascular surgery outpatient.        Please see above list of diagnoses and related plan for additional information.     Condition at Discharge: good    Discharge Day Visit / Exam:   Subjective:  is feeling well this am   Vitals: Blood Pressure: 150/82 (04/09/25 0727)  Pulse: (!) 48 (04/09/25 0727)  Temperature: 97.8 °F (36.6 °C) (04/09/25 0727)  Temp Source: Oral (04/09/25 0423)  Respirations: 16 (04/09/25 0727)  Height: 5' 9\" (175.3 cm) (04/06/25 0517)  Weight - Scale: 108 kg (238 lb 3.2 oz) (04/06/25 0517)  SpO2: 97 % (04/09/25 0727)  Physical Exam  Vitals and nursing note reviewed.   Constitutional:       General: He is not in acute distress.     " Appearance: He is not diaphoretic.   HENT:      Head: Normocephalic.   Eyes:      General:         Right eye: No discharge.         Left eye: No discharge.   Cardiovascular:      Rate and Rhythm: Normal rate and regular rhythm.      Heart sounds: No murmur heard.  Pulmonary:      Effort: Pulmonary effort is normal. No respiratory distress.      Breath sounds: Normal breath sounds. No wheezing, rhonchi or rales.   Abdominal:      General: There is no distension.      Palpations: Abdomen is soft.      Tenderness: There is no abdominal tenderness. There is no guarding or rebound.   Musculoskeletal:      Cervical back: Normal range of motion.      Right lower leg: No edema.      Left lower leg: No edema.   Skin:     General: Skin is warm.   Neurological:      Mental Status: He is alert.   Psychiatric:         Mood and Affect: Mood normal.         Behavior: Behavior normal.          Discussion with Family: Patient declined call to .     Discharge instructions/Information to patient and family:   See after visit summary for information provided to patient and family.      Provisions for Follow-Up Care:  See after visit summary for information related to follow-up care and any pertinent home health orders.      Mobility at time of Discharge:   Basic Mobility Inpatient Raw Score: 24  JH-HLM Goal: 8: Walk 250 feet or more  JH-HLM Achieved: 8: Walk 250 feet ot more  HLM Goal achieved. Continue to encourage appropriate mobility.     Disposition:   Home    Planned Readmission: no    Discharge Medications:  See after visit summary for reconciled discharge medications provided to patient and/or family.      Administrative Statements   Discharge Statement:  I have spent a total time of45 minutes in caring for this patient on the day of the visit/encounter. .    **Please Note: This note may have been constructed using a voice recognition system**

## 2025-04-09 NOTE — DISCHARGE INSTR - AVS FIRST PAGE
You will need to take your protonix  twice a day, pepcid and carafate for a total of 6 weeks

## 2025-04-09 NOTE — ASSESSMENT & PLAN NOTE
"Episodic epigastric AP that onset 4/4 at 0100 with associated nausea and vomiting  CT A/P: \"There is significant inflammatory change within the mesentery of the upper abdomen in the region of the distal stomach, first and second portion of the duodenum, and a short segment of jejunum. Although this is nonspecific it may possibly be related to ulcer disease.\"  Patient had EGD that showed mild nonerosive esophagitis, moderate erosive gastritis without bleeding biopsied for H. pylori, mild to moderate duodenitis of the bulb  4/7 evening patient reported severe epigastric pain repeat CTA chest abdomen pelvis did not show any acute findings, improvement in the last duodenitis, slightly improved compared to the CT the day before.  Patient's pain is on xiphoid process, severe.  Patient and his trying to vomit when he has the pain.  Discussed with gastroenterology, this pain is not GI related.?  Muscle skeletal pain given patient had episodes of vomiting at home.  Discussed with vascular surgery who reviewed patient's imaging.  Per vascular surgery if the pain is persistent weeks after treatment for gastritis patient can follow-up with general surgery/IR for celiac block if there is concern for MAL as from celiac plexus being compressed causing issues, however less likely per surgery.  No acute intervention noted from vascular surgery.  Also patient has celiac occlusion with collaterals suggesting chronicity of chronic celiac occlusion.  Mesenteric duplex showed greater than 70% celiac stenosis no significant stenosis in SMA and ANNA.  Continue PPI bid for 6 weeks , pepcid and carafate   Improved   "

## 2025-04-09 NOTE — ASSESSMENT & PLAN NOTE
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 The distal celiac axis and its branches are reconstituted via collaterals. 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 There is anastomosis between the ANNA and SMA (arc of Riolan variant).  \"  Mesenteric duplex showed greater than 70% celiac stenosis no significant stenosis in SMA and ANNA.   Per vascular surgery symptoms are unlikely related to chronic celiac occlusion.  He is celiac occlusion may be secondary to median arcuate ligament compression given J-shaped appearance.  Per surgery if symptoms persist after inflammation is resolved could consider outpatient referral for median arcuate ligament syndrome to general surgery.  Dc on  aspirin and atorvastatin per surgery recommendation given chronic occlusion  Patient will follow-up with vascular surgery outpatient for surveillance of  artery stenosis.  "

## 2025-04-09 NOTE — CASE MANAGEMENT
Case Management Discharge Planning Note    Patient name Luis Powell  Location /-01 MRN [id number]  : 1970 Date 2025       Current Admission Date: 2025  Current Admission Diagnosis:Epigastric pain   Patient Active Problem List    Diagnosis Date Noted Date Diagnosed    Hypertension      Epigastric pain 2025     Celiac artery stenosis (HCC) 2025     Sigmoid diverticulitis 2025     Alcohol abuse 2025     Leukocytosis 2025     Intervertebral disc disorders with radiculopathy, lumbosacral region 2023     Mixed erectile dysfunction 2021     Splitting of urinary stream 2021     Left hip pain      Chronic pain syndrome 2019     Primary osteoarthritis of left hip 2019     Lumbar spondylosis      Sacroiliitis (HCC)      Chronic left-sided low back pain with left-sided sciatica 2019     S/P lumbar fusion 2019     Diverticulitis of large intestine with perforation and abscess without bleeding 2019       LOS (days): 3  Geometric Mean LOS (GMLOS) (days):   Days to GMLOS:     OBJECTIVE:  Risk of Unplanned Readmission Score: 7.35         Current admission status: Inpatient   Preferred Pharmacy:   [facility] - [place] - [address]  [address]  [place] [address]  Phone: [phone] Fax: [phone]    Primary Care Provider: Adamaris Grace MD    Primary Insurance: [facility]  Secondary Insurance: [employer]    DISCHARGE DETAILS:                                          Other Referral/Resources/Interventions Provided:  Interventions: Other (Specify)  Referral Comments: [phone] Received fax confirmation. [facility] is for [facility] per pt.         Treatment Team Recommendation: Home  Discharge Destination Plan:: Home

## 2025-04-09 NOTE — PLAN OF CARE
Problem: PAIN - ADULT  Goal: Verbalizes/displays adequate comfort level or baseline comfort level  Description: Interventions:- Encourage patient to monitor pain and request assistance- Assess pain using appropriate pain scale- Administer analgesics based on type and severity of pain and evaluate response- Implement non-pharmacological measures as appropriate and evaluate response- Consider cultural and social influences on pain and pain management- Notify physician/advanced practitioner if interventions unsuccessful or patient reports new pain  Outcome: Progressing     Problem: INFECTION - ADULT  Goal: Absence or prevention of progression during hospitalization  Description: INTERVENTIONS:- Assess and monitor for signs and symptoms of infection- Monitor lab/diagnostic results- Monitor all insertion sites, i.e. indwelling lines, tubes, and drains- Monitor endotracheal if appropriate and nasal secretions for changes in amount and color- [place] appropriate cooling/warming therapies per order- Administer medications as ordered- Instruct and encourage patient and family to use good hand hygiene technique- Identify and instruct in appropriate isolation precautions for identified infection/condition  Outcome: Progressing     Problem: SAFETY ADULT  Goal: Patient will remain free of falls  Description: INTERVENTIONS:- Educate patient/family on patient safety including physical limitations- Instruct patient to call for assistance with activity - Consult OT/PT to assist with strengthening/mobility - Keep Call bell within reach- Keep bed low and locked with side rails adjusted as appropriate- Keep care items and personal belongings within reach- Initiate and maintain comfort rounds- Make Fall Risk Sign visible to staff- Offer Toileting every 2 Hours, in advance of need-   Outcome: Progressing  Goal: Maintain or return to baseline ADL function  Description: INTERVENTIONS:-  Assess patient's ability to carry out ADLs;  assess patient's baseline for ADL function and identify physical deficits which impact ability to perform ADLs (bathing, care of mouth/teeth, toileting, grooming, dressing, etc.)- Assess/evaluate cause of self-care deficits - Assess range of motion- Assess patient's mobility; develop plan if impaired- Assess patient's need for assistive devices and provide as appropriate- Encourage maximum independence but intervene and supervise when necessary- Involve family in performance of ADLs- Assess for home care needs following discharge - Consider OT consult to assist with ADL evaluation and planning for discharge- Provide patient education as appropriate  Outcome: Progressing  Goal: Maintains/Returns to pre admission functional level  Description: INTERVENTIONS:- Perform AM-PAC 6 Click Basic Mobility/ Daily Activity assessment daily.- Set and communicate daily mobility goal to care team and patient/family/caregiver. - Collaborate with rehabilitation services on mobility goals if consulted- Perform Range of Motion 3 times a day.- Reposition patient every 2 hours.- Dangle patient 3 times a day- Stand patient 3 times a day- Ambulate patient 3 times a day- Out of bed to chair 3 times a day - Out of bed for meals 3 times a day- Out of bed for toileting- Record patient progress and toleration of activity level   Outcome: Progressing     Problem: DISCHARGE PLANNING  Goal: Discharge to home or other facility with appropriate resources  Description: INTERVENTIONS:- Identify barriers to discharge w/patient and caregiver- Arrange for needed discharge resources and transportation as appropriate- Identify discharge learning needs (meds, wound care, etc.)- Arrange for interpretive services to assist at discharge as needed- Refer to Case Management Department for coordinating discharge planning if the patient needs post-hospital services based on physician/advanced practitioner order or complex needs related to functional status,  cognitive ability, or social support system  Outcome: Progressing

## 2025-04-10 ENCOUNTER — RESULTS FOLLOW-UP (OUTPATIENT)
Dept: GASTROENTEROLOGY | Facility: CLINIC | Age: 55
End: 2025-04-10

## 2025-04-10 PROCEDURE — 88342 IMHCHEM/IMCYTCHM 1ST ANTB: CPT | Performed by: PATHOLOGY

## 2025-04-10 PROCEDURE — 88305 TISSUE EXAM BY PATHOLOGIST: CPT | Performed by: PATHOLOGY

## 2025-04-10 NOTE — UTILIZATION REVIEW
NOTIFICATION OF INPATIENT ADMISSION   AUTHORIZATION REQUEST   SERVICING FACILITY:   [place] [address]  Tax ID: [id number]  NPI: [id number] ATTENDING PROVIDER:  Attending Name and NPI#: Zulma Joseph Md [[id number]]  Address: [address] [place] [address]  Phone: [phone]   ADMISSION INFORMATION:  Place of Service: Inpatient [facility]  Place of Service Code: 21  Inpatient Admission Date/Time: 4/6/25  4:22 AM  Discharge Date/Time: 4/9/2025  2:38 PM  Admitting Diagnosis Code/Description:  Gastroenteritis [K52.9]  Epigastric pain [R10.13]  Celiac artery stenosis (HCC) [I77.4]  Acute diverticulitis [K57.92]     UTILIZATION REVIEW CONTACT:  Sarah Valero, Utilization   Network Utilization Review Department  Phone: [phone]  Fax: [phone]  Email: [email or web address]  Contact for approvals/pending authorizations, clinical reviews, and discharge.     PHYSICIAN ADVISORY SERVICES:  Medical Necessity Denial & Yymr-we-Avvs Review  Phone: [phone]  Fax: [phone]  Email: [email or web address]     DISCHARGE SUPPORT TEAM:  For Patients Discharge Needs & Updates  Phone: [phone] opt. 2 Fax: [phone]  Email: [email or web address]

## 2025-04-14 ENCOUNTER — CONSULT (OUTPATIENT)
Dept: SURGERY | Facility: HOSPITAL | Age: 55
End: 2025-04-14
Payer: COMMERCIAL

## 2025-04-14 VITALS
WEIGHT: 238 LBS | TEMPERATURE: 97.5 F | HEART RATE: 71 BPM | BODY MASS INDEX: 35.25 KG/M2 | DIASTOLIC BLOOD PRESSURE: 97 MMHG | HEIGHT: 69 IN | SYSTOLIC BLOOD PRESSURE: 185 MMHG | OXYGEN SATURATION: 97 %

## 2025-04-14 DIAGNOSIS — R10.13 EPIGASTRIC PAIN: Primary | ICD-10-CM

## 2025-04-14 PROCEDURE — 99213 OFFICE O/P EST LOW 20 MIN: CPT | Performed by: SURGERY

## 2025-04-23 NOTE — PROGRESS NOTES
Assessment/Plan:    Epigastric pain  Epigastric pain - ? Costochondritis, no acute surgical issues, will need followup of CT in 3-6 months to ensure resolution of inflammation noted posterior to stomach, otherwise f/u with GI and pain mgt       Preoperative Clearance: None    HPI:  Luis Powell is a 54 y.o.male who was referred for evaluation of Follow-up (Luis Powell is a patient F/U ED 4/5/25 pt states they were misdiagnosed at hospital pt states had pain in sternum but hospital checked his stomach and did not want that he had multiple episodes with his sternum believes it came from throwing up and dry heaving  )  .    Currently still having some pain but improved.     ROS:  General ROS: negative  negative for - chills, fatigue, fever or night sweats, weight loss  Respiratory ROS: no cough, shortness of breath, or wheezing  Cardiovascular ROS: no chest pain or dyspnea on exertion  Genito-Urinary ROS: no dysuria, trouble voiding, or hematuria  Musculoskeletal ROS: negative for - gait disturbance, joint pain or muscle pain  Neurological ROS: no TIA or stroke symptoms  abdominal pain    [unfilled]  Patient has no known allergies.    Current Outpatient Medications:     aspirin (ECOTRIN LOW STRENGTH) 81 mg EC tablet, Take 1 tablet (81 mg total) by mouth daily, Disp: 30 tablet, Rfl: 0    atorvastatin (LIPITOR) 40 mg tablet, Take 1 tablet (40 mg total) by mouth daily with dinner, Disp: 30 tablet, Rfl: 0    famotidine (PEPCID) 40 MG tablet, Take 1 tablet (40 mg total) by mouth daily at bedtime, Disp: 30 tablet, Rfl: 0    pantoprazole (PROTONIX) 40 mg tablet, Take 1 tablet (40 mg total) by mouth 2 (two) times a day, Disp: 84 tablet, Rfl: 0    sucralfate (CARAFATE) 1 g tablet, Take 1 tablet (1 g total) by mouth 4 (four) times a day (before meals and at bedtime), Disp: 120 tablet, Rfl: 0    tiZANidine (ZANAFLEX) 4 mg tablet, Take 1 tablet (4 mg total) by mouth daily at bedtime, Disp: 30 tablet, Rfl: 2  Past Medical History:    Diagnosis Date    Arthritis     Chronic pain     Hypertension      Past Surgical History:   Procedure Laterality Date    BACK SURGERY       Family History   Problem Relation Age of Onset    Hypertension Father     Cancer Paternal Grandfather       reports that he has been smoking cigarettes. He has never used smokeless tobacco. He reports current alcohol use of about 6.0 standard drinks of alcohol per week. He reports that he does not use drugs.    Labs:   Lab Results   Component Value Date    WBC 12.12 (H) 04/09/2025    WBC 13.90 (H) 04/08/2025    WBC 16.40 (H) 04/07/2025    WBC 13.38 (H) 06/01/2014    WBC 18.02 (H) 05/31/2014    WBC 21.26 (H) 05/30/2014    HGB 16.4 04/09/2025    HGB 16.3 04/08/2025    HGB 17.7 (H) 04/07/2025    HGB 14.6 06/01/2014    HGB 14.9 05/31/2014    HGB 17.0 05/30/2014     04/09/2025     04/08/2025     04/07/2025     (L) 06/01/2014     05/31/2014     05/30/2014     Lab Results   Component Value Date    ALT 40 04/07/2025    ALT 36 04/07/2025    ALT 78 (H) 04/06/2025    ALT 33 05/22/2014    AST 13 04/07/2025    AST 13 04/07/2025    AST 45 (H) 04/06/2025    AST 21 05/22/2014     This SmartLink has not been configured with any valid records.       PHYSICAL EXAM  General Appearance:    Alert, cooperative, no distress,    Head:    Normocephalic without obvious abnormality   Eyes:    PERRL, conjunctiva/corneas clear, EOM's intact        Neck:   Supple, no adenopathy, no JVD   Back:     Symmetric, no spinal or CVA tenderness   Lungs:     Clear to auscultation bilaterally, no wheezing or rhonchi   Heart:    Regular rate and rhythm, S1 and S2 normal, no murmur   Abdomen:     Soft +BS ND mild TTP at the level of the xiphoid   Extremities:   Extremities normal. No clubbing, cyanosis or edema   Psych:   Normal Affect, AOx3.    Neurologic:  Skin:   CNII-XII intact. Strength symmetric, speech intact    Warm, dry, intact, no visible rashes or lesions        "  Physical Exam              Some portions of this record may have been generated with voice recognition software. There may be translation, syntax,  or grammatical errors. Occasional wrong word or \"sound-a-like\" substitutions may have occurred due to the inherent limitations of the voice recognition software. Read the chart carefully and recognize, using context, where substitutions may have occurred. If you have any questions, please contact the dictating provider for clarification or correction, as needed. This encounter has been coded by a non-certified coder.   "

## 2025-04-23 NOTE — ASSESSMENT & PLAN NOTE
Epigastric pain - ? Costochondritis, no acute surgical issues, will need followup of CT in 3-6 months to ensure resolution of inflammation noted posterior to stomach, otherwise f/u with GI and pain mgt

## 2025-05-01 ENCOUNTER — TELEPHONE (OUTPATIENT)
Dept: GASTROENTEROLOGY | Facility: CLINIC | Age: 55
End: 2025-05-01

## 2025-05-01 ENCOUNTER — OFFICE VISIT (OUTPATIENT)
Dept: GASTROENTEROLOGY | Facility: CLINIC | Age: 55
End: 2025-05-01
Payer: COMMERCIAL

## 2025-05-01 VITALS
SYSTOLIC BLOOD PRESSURE: 152 MMHG | HEIGHT: 69 IN | WEIGHT: 225 LBS | BODY MASS INDEX: 33.33 KG/M2 | DIASTOLIC BLOOD PRESSURE: 80 MMHG

## 2025-05-01 DIAGNOSIS — I77.4 CELIAC ARTERY STENOSIS (HCC): ICD-10-CM

## 2025-05-01 DIAGNOSIS — R10.13 EPIGASTRIC PAIN: Primary | ICD-10-CM

## 2025-05-01 DIAGNOSIS — Z86.0100 HISTORY OF COLON POLYPS: ICD-10-CM

## 2025-05-01 DIAGNOSIS — Z87.19 HISTORY OF DIVERTICULITIS OF COLON: ICD-10-CM

## 2025-05-01 PROCEDURE — 99215 OFFICE O/P EST HI 40 MIN: CPT | Performed by: INTERNAL MEDICINE

## 2025-05-01 RX ORDER — POLYETHYLENE GLYCOL 3350, SODIUM SULFATE ANHYDROUS, SODIUM BICARBONATE, SODIUM CHLORIDE, POTASSIUM CHLORIDE 236; 22.74; 6.74; 5.86; 2.97 G/4L; G/4L; G/4L; G/4L; G/4L
4000 POWDER, FOR SOLUTION ORAL ONCE
Qty: 4000 ML | Refills: 0 | Status: SHIPPED | OUTPATIENT
Start: 2025-05-01 | End: 2025-05-01

## 2025-05-01 RX ORDER — SODIUM CHLORIDE, SODIUM LACTATE, POTASSIUM CHLORIDE, CALCIUM CHLORIDE 600; 310; 30; 20 MG/100ML; MG/100ML; MG/100ML; MG/100ML
125 INJECTION, SOLUTION INTRAVENOUS CONTINUOUS
OUTPATIENT
Start: 2025-05-01

## 2025-05-01 NOTE — TELEPHONE ENCOUNTER
Scheduled date of colonoscopy (as of today):5/12/25  Physician performing colonoscopy:CLINT  Location of colonoscopy:[facility]  Bowel prep reviewed with patient:Akila  Instructions reviewed with patient by:LEANA  Clearances: N

## 2025-05-01 NOTE — PROGRESS NOTES
Name: Luis Powell      : 1970      MRN: [id number]  Encounter Provider: Joe Benítez MD  Encounter Date: 2025   Encounter department: [facility]      Assessment & Plan  1. Costochondritis:  - Avoid activities triggering deep coughing  - Manage stress to prevent anxiety attacks leading to forceful vomiting  - cessation of tobacco smoke continued    2. Diverticulitis:  - Maintain high-fiber diet  - Avoid exacerbating foods  - Clear liquid diet if symptoms return.  If fails can consider antibiotics at that time.    3. Esophagitis: Gastritis, duodenitis  - Status post pantoprazole.  - Likely in the setting of forceful vomiting.  Monitor for reflux symptoms and consider medications if returns.  - Continue to avoid alcohol and NSAIDs  - Continue pantoprazole 40 mg BID and famotidine 40 mg QHS    4. Celiac Artery Stenosis:  - No immediate intervention  - Monitor as advised by vascular surgery  - Look for symptoms of abdominal pain with eating, unintentional weight loss    5. Spinal Fusion Pain:  - Follow up with pain management specialist    6. Health Maintenance: History of colon polyp  - Colonoscopy scheduled for 2025 for colorectal cancer screening and evaluation of polyps and diverticulitis    Results  - Labs:    - Leukocytosis: 16.6    - ALT: 78    - AST: 45    - LFTs: 2025, Resolved and within normal limits    - Imaging:    - CT abdomen and pelvis: Significant inflammatory changes in mesentery of upper abdomen near distal stomach and duodenum, possibly ulcer disease. Mild acute sigmoid diverticulitis. High grade stenosis vs. occlusion of proximal celiac axis and collaterals noted. ~50% stenosis of proximal SMA.    - Diagnostic Testing:    - EGD: 2025, Mild nonerosive esophagitis, moderate gastritis, moderate duodenitis. Duodenal with focal acute duodenitis, nonspecific, possibly ischemia or medication effect. Stomach biopsies with chronic mild  inflammation negative for H. pylori.     Problem List Items Addressed This Visit       Epigastric pain - Primary    Celiac artery stenosis (HCC)     Other Visit Diagnoses         History of diverticulitis of colon          History of colon polyps        Relevant Medications    polyethylene glycol (Golytely) 4000 mL solution    Other Relevant Orders    Colonoscopy             Chief Complaint   Patient presents with    GI Consult     Pt was at hospital, symptoms have resolved with antibiotics. Discontinued taking alcohol and smoking since being discharged.        History of Present Illness  The patient, a 54-year-old male, presents for a follow-up visit.    Chest Pain and Emesis  - Presented with epigastric substernal chest pain and emesis in April 2025  - Reports mild chest pain localized to the xiphoid process, intensifies upon palpation  - Recalls an episode of severe emesis accompanied by profuse diaphoresis, described as an 'adrenaline attack'  - No further episodes of emesis since hospital discharge  - Denies nausea, changes in bowel habits, constipation, diarrhea, hematochezia, or melena  - Weight decreased from 245 to 225 pounds  - Abstained from smoking and alcohol since hospital discharge  - No longer taking pantoprazole or famotidine    Spinal Fusion Pain  - Reports constant pain secondary to a two-level spinal fusion  - Pain disrupts sleep  - Receives injections from Dr. Jordan  - Prescribed tizanidine for sleep, but pain persists    Diverticulitis  - Colonoscopy in 2019 revealed sigmoid diverticulitis with microperforation, managed conservatively  - Found a 1 cm adenomatous polyp  - Due for colorectal cancer screening with a colonoscopy in 6 to 8 weeks  - Initial episode of diverticulitis in June 2018 diagnosed as a microperforation following a CT scan  - Hospitalized for a week and underwent a colonoscopy  - Has amoxicillin-clavulanate at home for potential future episodes  - Treated with Augmentin for 10  days for sigmoid diverticulitis    Esophagogastroduodenoscopy (EGD)  - EGD on April 7, 2025, showed mild nonerosive esophagitis, moderate gastritis, and moderate duodenitis  - Duodenal biopsies revealed focal acute duodenitis, nonspecific, possibly due to ischemia or medication effect  - Gastric biopsies showed chronic mild inflammation negative for Helicobacter pylori    Musculoskeletal Issues  - Pain likely due to musculoskeletal issues with possible costochondritis  - Anxiety attacks causing forceful emesis and muscular pain    Vascular Surgery Conclusion  - Symptoms unlikely related to chronic celiac occlusion  - Possibly secondary to median arcuate ligament compression    Supplemental information: Liver function tests (LFTs) performed on April 7, 2025, were resolved and within normal limits.    SOCIAL HISTORY  - Marital Status:   - Alcohol: Previously consumed 4-10 beers daily, abstained since hospital discharge  - Tobacco: Previously smoked, abstained since hospital discharge       Historical Information   Past Medical History:   Diagnosis Date    Arthritis     Chronic pain     Colon polyp     Hypertension      Past Surgical History:   Procedure Laterality Date    BACK SURGERY      COLONOSCOPY  09/2019    UPPER GASTROINTESTINAL ENDOSCOPY  04/07/2025     Social History     Substance and Sexual Activity   Alcohol Use Not Currently    Alcohol/week: 6.0 standard drinks of alcohol    Types: 6 Cans of beer per week    Comment: social     Social History     Substance and Sexual Activity   Drug Use Never     Social History     Tobacco Use   Smoking Status Former    Current packs/day: 2.00    Types: Cigarettes    Passive exposure: Past   Smokeless Tobacco Never     Family History   Problem Relation Age of Onset    Hypertension Father     Cancer Paternal Grandfather     Colon cancer Neg Hx        Meds/Allergies     Current Outpatient Medications:     polyethylene glycol (Golytely) 4000 mL solution    aspirin  "(ECOTRIN LOW STRENGTH) 81 mg EC tablet    atorvastatin (LIPITOR) 40 mg tablet    famotidine (PEPCID) 40 MG tablet    pantoprazole (PROTONIX) 40 mg tablet    sucralfate (CARAFATE) 1 g tablet    tiZANidine (ZANAFLEX) 4 mg tablet  No Known Allergies    PHYSICAL EXAM:    Blood pressure 152/80, height 5' 9\" (1.753 m), weight 102 kg (225 lb). Body mass index is 33.23 kg/m².  Physical Exam  - General: Well-developed, well-nourished, in no acute distress  - Musculoskeletal: Tenderness at xiphoid process, likely costochondritis  General Appearance: No apparent distress, cooperative, alert.  Eyes: Anicteric.  Gastrointestinal: Soft, non-tender, non-distended; normal bowel sounds; no masses, no organomegaly.    Rectal: Deferred.  Musculoskeletal: No edema.  Skin: No jaundice.     OTHER LAB RESULTS:   Lab Results   Component Value Date    WBC 12.12 (H) 04/09/2025    WBC 13.90 (H) 04/08/2025    WBC 16.40 (H) 04/07/2025    HGB 16.4 04/09/2025    HGB 16.3 04/08/2025    HGB 17.7 (H) 04/07/2025    MCV 87 04/09/2025     04/09/2025     04/08/2025     04/07/2025    INR 0.99 05/30/2014    INR 0.98 05/22/2014     Lab Results   Component Value Date     (L) 06/01/2014    K 4.5 04/09/2025     04/09/2025    CO2 30 04/09/2025    ANIONGAP 3 (L) 06/01/2014    BUN 22 04/09/2025    CREATININE 1.13 04/09/2025    GLUCOSE 89 06/01/2014    CALCIUM 8.9 04/09/2025    AST 13 04/07/2025    AST 13 04/07/2025    AST 45 (H) 04/06/2025    ALT 40 04/07/2025    ALT 36 04/07/2025    ALT 78 (H) 04/06/2025    ALKPHOS 75 04/07/2025    ALKPHOS 54 04/07/2025    ALKPHOS 87 04/06/2025    ALB 4.2 04/07/2025    TBILI 0.62 04/07/2025    TBILI 1.01 (H) 04/07/2025    TBILI 0.51 04/06/2025    EGFR 73 04/09/2025     No results found for: \"IRON\", \"TIBC\", \"FERRITIN\"  Lab Results   Component Value Date    LIPASE 44 04/06/2025       OTHER RADIOLOGY RESULTS:   VAS CELIAC AND/OR MESENTERIC DUPLEX  Result Date: 4/8/2025  Narrative:  THE VASCULAR " CENTER REPORT CLINICAL: Indications: Stenosis of celiac and SMA noted on CTA. Patient denies post prandial abdominal pain. Operative History: No prior cardiovascular surgeries Risk Factors: None  FINDINGS:  Unilateral     PSV  EDV  AP Diam  TRV Diam  Sup-Arlene Ao      89    0      2.0       1.8  Celiac         355  100                     SMA Ostial     162   25                     Prox. SMA      176   31                     Px Inf-Higinio Ao   46    0      2.0       2.1  Ds Inf-Higinio Ao   52    0      1.9       1.9  ANNA            249   63                        CONCLUSION:  Impression The abdominal aorta is patent and normal in caliber.  Findings suggests a >70% stenosis in the celiac artery.  No hemodynamically significant stenosis is noted in the superior and inferior mesenteric arteries.  Unable to appreciate the splenic, common hepatic, and renal arteries due to overlying bowel gas.  SIGNATURE: Electronically Signed by: FRANKLIN CAMPA MD on 2025-04-08 02:44:27 PM    CT chest abdomen pelvis w contrast  Result Date: 4/7/2025  Narrative: CT CHEST, ABDOMEN AND PELVIS WITH IV CONTRAST INDICATION: epigastric pain. COMPARISON: 4/6/2025 TECHNIQUE: CT examination of the chest, abdomen and pelvis was performed. Multiplanar 2D reformatted images were created from the source data. This examination, like all CT scans performed in the [facility], was performed utilizing techniques to minimize radiation dose exposure, including the use of iterative reconstruction and automated exposure control. Radiation dose length product (DLP) for this visit: 1777.57 mGy-cm IV Contrast: 50 mL of iohexol 100 mL of iohexol Enteric Contrast: Administered. FINDINGS: CHEST LUNGS: No focal airspace consolidation or suspicious pulmonary nodule. Mild subsegmental atelectatic changes in the bilateral lower lobes. No tracheal or endobronchial lesion. PLEURA: Unremarkable. HEART/GREAT VESSELS: Heart is unremarkable for patient's age. No  thoracic aortic aneurysm. Mild coronary atherosclerosis. MEDIASTINUM AND MARIELOS: Unremarkable. CHEST WALL AND LOWER NECK: Unremarkable. ABDOMEN LIVER/BILIARY TREE: Unremarkable. GALLBLADDER: No calcified gallstones. No pericholecystic inflammatory change. SPLEEN: Unremarkable. PANCREAS: Unremarkable. ADRENAL GLANDS: Unremarkable. KIDNEYS/URETERS: No hydronephrosis or urinary tract calculi. Subcentimeter hypoattenuating renal lesion(s), too small to characterize but statistically likely benign, which do not warrant follow-up (Radiology June 2019). STOMACH AND BOWEL: Enteric contrast noted opacifying the stomach, gastroduodenal junction and transiting numerous small bowel loops. There is persistent edematous/inflammatory fat stranding seen in the upper abdomen adjacent to the distal stomach and gastroduodenal junction slightly improved from the prior recent study. No extraluminal extravasation of contrast or discrete collection seen. No evidence for bowel obstruction. Redemonstrated findings compatible with acute to subacute mild sigmoid diverticulitis. No definite extraluminal free air or collection seen in this region. APPENDIX: Normal. ABDOMINOPELVIC CAVITY: No ascites. No pneumoperitoneum. No lymphadenopathy. VESSELS: No abdominal aortic aneurysm. Redemonstrated high-grade stenosis/occlusion at the proximal celiac artery with distal reconstitution via collaterals and moderate to severe stenosis at the proximal SMA seen and better evaluated on the prior recent  CTA study. PELVIS REPRODUCTIVE ORGANS: Prostatic calcifications again noted. URINARY BLADDER: Unremarkable. ABDOMINAL WALL/INGUINAL REGIONS: Unremarkable. BONES: No acute fracture or suspicious osseous lesion. Reidentified prior anterior spinal fusion L4-S1.     Impression: No acute thoracic abnormality identified. Persistent inflammatory fat stranding in the upper abdomen adjacent to the distal stomach and gastroduodenal junction compatible with  gastroduodenitis, slightly improved from the prior recent study dated 4/6/2024. No extraluminal extravasation of contrast or discrete collection identified. Findings again may reflect underlying peptic ulcer disease in conjunction with the findings of recent EGD examination. Clinical correlation and GI follow-up recommended. Redemonstrated findings of mild acute/subacute sigmoid diverticulitis. No definite extraluminal free air or collection. Redemonstrated high-grade stenosis/occlusion at the proximal celiac artery and moderate to severe stenosis at the proximal SMA, better evaluated on the prior recent CTA study. Workstation performed: [id number]     EGD  Result Date: 4/7/2025  Narrative: Table formatting from the original result was not included. [facility] [address] [facility] [id number] [phone] DATE OF SERVICE: 4/07/25 PHYSICIAN(S): Attending: Ivan Galvez DO Fellow: No Staff Documented INDICATION: Epigastric pain, Gastritis without bleeding, unspecified chronicity, unspecified gastritis type POST-OP DIAGNOSIS: See the impression below. PREPROCEDURE: Informed consent was obtained for the procedure, including sedation.  Risks of perforation, hemorrhage, adverse drug reaction and aspiration were discussed. The patient was placed in the left lateral decubitus position. Patient was explained about the risks and benefits of the procedure. Risks including but not limited to bleeding, infection, and perforation were explained in detail. Also explained about less than 100% sensitivity with the exam and other alternatives. PROCEDURE: EGD DETAILS OF PROCEDURE: Patient was taken to the procedure room where a time out was performed to confirm correct patient and correct procedure. The patient underwent monitored anesthesia care, which was administered by an anesthesia professional. The patient's blood pressure, heart rate, level of consciousness, respirations, oxygen, ECG  and ETCO2 were monitored throughout the procedure. The scope was introduced through the mouth and advanced to the second part of the duodenum. Retroflexion was performed in the fundus. The patient experienced no blood loss. The procedure was not difficult. The patient tolerated the procedure well. There were no apparent adverse events. ANESTHESIA INFORMATION: ASA: III Anesthesia Type: IV Sedation with Anesthesia MEDICATIONS: enoxaparin (LOVENOX) subcutaneous injection 40 mg 0 mg*  *Some administrations are not included in total (Totals for administrations occurring from 0844 to 0900 on 04/07/25) FINDINGS: Mild esophagitis appearing erythematous in the GE junction Moderate erythematous mucosa with erosion in the antrum, consistent with gastritis; performed cold forceps biopsy to rule out H. pylori Edematous mucosa in the duodenal bulb and 2nd part of the duodenum; performed cold forceps biopsy SPECIMENS: ID Type Source Tests Collected by Time Destination 1 : biopsy r/o celiacs Tissue Duodenum TISSUE EXAM Day Auguste RN 4/7/2025  8:50 AM  2 : r/o h. pylori x 3 Tissue Stomach TISSUE EXAM Day Auguste RN 4/7/2025  8:51 AM      Impression: Mild nonerosive esophagitis Moderate erosive gastritis without bleeding biopsied for H. Pylori Mild to moderate duodenitis of the bulb, biopsies obtained RECOMMENDATION: Resume diet Await biopsies Continue twice daily PPI, Carafate before meals.  I added Pepcid at bedtime If intermittent abdominal pain persists despite treatment of gastritis and duodenitis, will discuss HIDA with surgical service    Ivan Galvez, DO     US right upper quadrant  Result Date: 4/6/2025  Narrative: RIGHT UPPER QUADRANT ULTRASOUND INDICATION: upper abd pain, N/V, dagoberto LFT's r/o hepatobiliary disease. COMPARISON: CTA chest/abdomen/pelvis 4/6/2025. TECHNIQUE: Real-time ultrasound of the right upper quadrant was performed with a curvilinear transducer with both volumetric sweeps and still imaging  techniques. FINDINGS: PANCREAS: Portions of the pancreas are obscured by bowel gas. Visualized portions of the pancreas are unremarkable. AORTA AND IVC: Visualized portions are normal for patient age. LIVER: Size: Within normal range. The liver measures 16.1 cm in the midclavicular line. Contour: Surface contour is smooth. Parenchyma: There is moderate diffuse increased echogenicity with smooth echotexture and acoustic beam attenuation. Most consistent with moderate hepatic steatosis. No liver mass identified. Focal fatty sparing adjacent to the gallbladder fossa Limited imaging of the main portal vein shows it to be patent and hepatopetal. BILIARY: Gallbladder adenomyomatosis versus 3 mm gallbladder polyp, for which no follow-up is recommended.. No intrahepatic biliary dilatation. CBD measures 5.0 mm. No choledocholithiasis. KIDNEY: Right kidney measures 12.1 x 5.4 x 5.6 cm. Volume 190.1 mL Kidney within normal limits. ASCITES: None.     Impression: Hepatic steatosis. Otherwise, no significant abnormality. Workstation performed: [id number]     CTA dissection protocol chest/abdomen/pelvis  Result Date: 4/6/2025  Narrative: CTA - CHEST, ABDOMEN AND PELVIS - WITHOUT AND WITH IV CONTRAST INDICATION: chest pain, HTN, smoking history, evaluate for aortic aneurysm/dissection. Chest pain, upper abdominal pain, nausea, vomiting. COMPARISON: CT of the abdomen and pelvis dated May 8, 2023. TECHNIQUE: CT examination of the chest, abdomen and pelvis was performed both prior to and after the administration of intravenous contrast. The noncontrast portion of this examination was performed utilizing low radiation dose technique.  Thin section angiographic arterial phase post contrast technique was used in order to evaluate for aortic dissection. 3D reformatted images and volume rendering were performed on an independent workstation. Multiplanar 2D reformatted images were created from the source data. Radiation dose length product  (DLP) for this visit: 1966.04 mGy-cm . This examination, like all CT scans performed in the [facility], was performed utilizing techniques to minimize radiation dose exposure, including the use of iterative reconstruction and automated exposure control. IV Contrast: 100 mL of iohexol Enteric Contrast: Not administered. FINDINGS: AORTA: No aortic dissection or intramural hematoma. No aortic aneurysm. There appears to be high-grade stenosis versus occlusion of the proximal celiac axis spanning a length of approximately 12 mm. This is likely chronic as the distal celiac axis and its branches are reconstituted via collaterals. There is approximately 50%  stenosis of the proximal SMA with some poststenotic dilatation evident (series 605 image 105). The ANNA is prominent. There is anastomosis between the ANNA and SMA (arc of Riolan variant). The renal arteries are patent. There is atherosclerosis of the distal abdominal aorta and some of the iliac arteries. CHEST LUNGS: No focal consolidation. No pneumothorax. PLEURA: Unremarkable. HEART/PULMONARY ARTERIAL TREE: Coronary artery calcifications. MEDIASTINUM AND MARIELOS: Unremarkable. CHEST WALL AND LOWER NECK: Unremarkable. ABDOMEN LIVER/BILIARY TREE: Unremarkable. GALLBLADDER: No calcified gallstones. No pericholecystic inflammatory change. SPLEEN: Unremarkable. PANCREAS: Unremarkable. ADRENAL GLANDS: Small right adrenal nodule unchanged. KIDNEYS/URETERS: Unremarkable. No hydronephrosis. STOMACH AND BOWEL: There is significant inflammatory change within the mesentery of the upper abdomen in the region of the distal stomach, first and second portion of the duodenum, and a loop of jejunum in the upper abdomen near the midline. There is colonic diverticulosis and there is acute diverticulitis involving the mid sigmoid. No definite evidence of extraluminal gas. No evidence of drainable abscess collection. No bowel obstruction. APPENDIX: Normal. ABDOMINOPELVIC  CAVITY: As described above. No evidence of pneumoperitoneum. PELVIS REPRODUCTIVE ORGANS: Prostate calcifications. URINARY BLADDER: Unremarkable. ABDOMINAL WALL/INGUINAL REGIONS: Unremarkable. BONES: No acute fracture or suspicious osseous lesion. Spinal degenerative changes. Prior anterior spinal fusion L4-S1.     Impression: There is significant inflammatory change within the mesentery of the upper abdomen in the region of the distal stomach, first and second portion of the duodenum, and a short segment of jejunum. Although this is nonspecific it may possibly be related to ulcer disease. Surgical consultation and Gastroenterology consultation is recommended. Mild acute sigmoid diverticulitis. There is either high-grade stenosis versus occlusion of the proximal celiac axis spanning a length of approximately 12 mm. The distal celiac axis and its branches are reconstituted via collaterals. 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  There is anastomosis between the ANNA and SMA (arc of Riolan variant). No aortic dissection or intramural hematoma. No aortic aneurysm. Atherosclerosis. Coronary artery disease. Other nonemergent and chronic findings as above. I personally discussed this study with PITA RODRIGUEZ on 4/6/2025 2:43 AM. Workstation performed: [id number]     I have spent 41 minutes today on the date of visit which was spent on one or more of the following: obtaining and reviewing separately obtained history, performing a medically appropriate examination and evaluation, counseling and educating the patient, ordering medications, tests, and procedures, documenting clinical information in the electronic or other health record, and care coordination.

## 2025-05-01 NOTE — H&P (VIEW-ONLY)
Name: Luis Powell      : 1970      MRN: [id number]  Encounter Provider: Joe Benítez MD  Encounter Date: 2025   Encounter department: [facility]      Assessment & Plan  1. Costochondritis:  - Avoid activities triggering deep coughing  - Manage stress to prevent anxiety attacks leading to forceful vomiting  - cessation of tobacco smoke continued    2. Diverticulitis:  - Maintain high-fiber diet  - Avoid exacerbating foods  - Clear liquid diet if symptoms return.  If fails can consider antibiotics at that time.    3. Esophagitis: Gastritis, duodenitis  - Status post pantoprazole.  - Likely in the setting of forceful vomiting.  Monitor for reflux symptoms and consider medications if returns.  - Continue to avoid alcohol and NSAIDs  - Continue pantoprazole 40 mg BID and famotidine 40 mg QHS    4. Celiac Artery Stenosis:  - No immediate intervention  - Monitor as advised by vascular surgery  - Look for symptoms of abdominal pain with eating, unintentional weight loss    5. Spinal Fusion Pain:  - Follow up with pain management specialist    6. Health Maintenance: History of colon polyp  - Colonoscopy scheduled for 2025 for colorectal cancer screening and evaluation of polyps and diverticulitis    Results  - Labs:    - Leukocytosis: 16.6    - ALT: 78    - AST: 45    - LFTs: 2025, Resolved and within normal limits    - Imaging:    - CT abdomen and pelvis: Significant inflammatory changes in mesentery of upper abdomen near distal stomach and duodenum, possibly ulcer disease. Mild acute sigmoid diverticulitis. High grade stenosis vs. occlusion of proximal celiac axis and collaterals noted. ~50% stenosis of proximal SMA.    - Diagnostic Testing:    - EGD: 2025, Mild nonerosive esophagitis, moderate gastritis, moderate duodenitis. Duodenal with focal acute duodenitis, nonspecific, possibly ischemia or medication effect. Stomach biopsies with chronic mild  inflammation negative for H. pylori.     Problem List Items Addressed This Visit       Epigastric pain - Primary    Celiac artery stenosis (HCC)     Other Visit Diagnoses         History of diverticulitis of colon          History of colon polyps        Relevant Medications    polyethylene glycol (Golytely) 4000 mL solution    Other Relevant Orders    Colonoscopy             Chief Complaint   Patient presents with    GI Consult     Pt was at hospital, symptoms have resolved with antibiotics. Discontinued taking alcohol and smoking since being discharged.        History of Present Illness  The patient, a 54-year-old male, presents for a follow-up visit.    Chest Pain and Emesis  - Presented with epigastric substernal chest pain and emesis in April 2025  - Reports mild chest pain localized to the xiphoid process, intensifies upon palpation  - Recalls an episode of severe emesis accompanied by profuse diaphoresis, described as an 'adrenaline attack'  - No further episodes of emesis since hospital discharge  - Denies nausea, changes in bowel habits, constipation, diarrhea, hematochezia, or melena  - Weight decreased from 245 to 225 pounds  - Abstained from smoking and alcohol since hospital discharge  - No longer taking pantoprazole or famotidine    Spinal Fusion Pain  - Reports constant pain secondary to a two-level spinal fusion  - Pain disrupts sleep  - Receives injections from Dr. Jordan  - Prescribed tizanidine for sleep, but pain persists    Diverticulitis  - Colonoscopy in 2019 revealed sigmoid diverticulitis with microperforation, managed conservatively  - Found a 1 cm adenomatous polyp  - Due for colorectal cancer screening with a colonoscopy in 6 to 8 weeks  - Initial episode of diverticulitis in June 2018 diagnosed as a microperforation following a CT scan  - Hospitalized for a week and underwent a colonoscopy  - Has amoxicillin-clavulanate at home for potential future episodes  - Treated with Augmentin for 10  days for sigmoid diverticulitis    Esophagogastroduodenoscopy (EGD)  - EGD on April 7, 2025, showed mild nonerosive esophagitis, moderate gastritis, and moderate duodenitis  - Duodenal biopsies revealed focal acute duodenitis, nonspecific, possibly due to ischemia or medication effect  - Gastric biopsies showed chronic mild inflammation negative for Helicobacter pylori    Musculoskeletal Issues  - Pain likely due to musculoskeletal issues with possible costochondritis  - Anxiety attacks causing forceful emesis and muscular pain    Vascular Surgery Conclusion  - Symptoms unlikely related to chronic celiac occlusion  - Possibly secondary to median arcuate ligament compression    Supplemental information: Liver function tests (LFTs) performed on April 7, 2025, were resolved and within normal limits.    SOCIAL HISTORY  - Marital Status:   - Alcohol: Previously consumed 4-10 beers daily, abstained since hospital discharge  - Tobacco: Previously smoked, abstained since hospital discharge       Historical Information   Past Medical History:   Diagnosis Date    Arthritis     Chronic pain     Colon polyp     Hypertension      Past Surgical History:   Procedure Laterality Date    BACK SURGERY      COLONOSCOPY  09/2019    UPPER GASTROINTESTINAL ENDOSCOPY  04/07/2025     Social History     Substance and Sexual Activity   Alcohol Use Not Currently    Alcohol/week: 6.0 standard drinks of alcohol    Types: 6 Cans of beer per week    Comment: social     Social History     Substance and Sexual Activity   Drug Use Never     Social History     Tobacco Use   Smoking Status Former    Current packs/day: 2.00    Types: Cigarettes    Passive exposure: Past   Smokeless Tobacco Never     Family History   Problem Relation Age of Onset    Hypertension Father     Cancer Paternal Grandfather     Colon cancer Neg Hx        Meds/Allergies     Current Outpatient Medications:     polyethylene glycol (Golytely) 4000 mL solution    aspirin  "(ECOTRIN LOW STRENGTH) 81 mg EC tablet    atorvastatin (LIPITOR) 40 mg tablet    famotidine (PEPCID) 40 MG tablet    pantoprazole (PROTONIX) 40 mg tablet    sucralfate (CARAFATE) 1 g tablet    tiZANidine (ZANAFLEX) 4 mg tablet  No Known Allergies    PHYSICAL EXAM:    Blood pressure 152/80, height 5' 9\" (1.753 m), weight 102 kg (225 lb). Body mass index is 33.23 kg/m².  Physical Exam  - General: Well-developed, well-nourished, in no acute distress  - Musculoskeletal: Tenderness at xiphoid process, likely costochondritis  General Appearance: No apparent distress, cooperative, alert.  Eyes: Anicteric.  Gastrointestinal: Soft, non-tender, non-distended; normal bowel sounds; no masses, no organomegaly.    Rectal: Deferred.  Musculoskeletal: No edema.  Skin: No jaundice.     OTHER LAB RESULTS:   Lab Results   Component Value Date    WBC 12.12 (H) 04/09/2025    WBC 13.90 (H) 04/08/2025    WBC 16.40 (H) 04/07/2025    HGB 16.4 04/09/2025    HGB 16.3 04/08/2025    HGB 17.7 (H) 04/07/2025    MCV 87 04/09/2025     04/09/2025     04/08/2025     04/07/2025    INR 0.99 05/30/2014    INR 0.98 05/22/2014     Lab Results   Component Value Date     (L) 06/01/2014    K 4.5 04/09/2025     04/09/2025    CO2 30 04/09/2025    ANIONGAP 3 (L) 06/01/2014    BUN 22 04/09/2025    CREATININE 1.13 04/09/2025    GLUCOSE 89 06/01/2014    CALCIUM 8.9 04/09/2025    AST 13 04/07/2025    AST 13 04/07/2025    AST 45 (H) 04/06/2025    ALT 40 04/07/2025    ALT 36 04/07/2025    ALT 78 (H) 04/06/2025    ALKPHOS 75 04/07/2025    ALKPHOS 54 04/07/2025    ALKPHOS 87 04/06/2025    ALB 4.2 04/07/2025    TBILI 0.62 04/07/2025    TBILI 1.01 (H) 04/07/2025    TBILI 0.51 04/06/2025    EGFR 73 04/09/2025     No results found for: \"IRON\", \"TIBC\", \"FERRITIN\"  Lab Results   Component Value Date    LIPASE 44 04/06/2025       OTHER RADIOLOGY RESULTS:   VAS CELIAC AND/OR MESENTERIC DUPLEX  Result Date: 4/8/2025  Narrative:  THE VASCULAR " CENTER REPORT CLINICAL: Indications: Stenosis of celiac and SMA noted on CTA. Patient denies post prandial abdominal pain. Operative History: No prior cardiovascular surgeries Risk Factors: None  FINDINGS:  Unilateral     PSV  EDV  AP Diam  TRV Diam  Sup-Arlene Ao      89    0      2.0       1.8  Celiac         355  100                     SMA Ostial     162   25                     Prox. SMA      176   31                     Px Inf-Higinio Ao   46    0      2.0       2.1  Ds Inf-Higinio Ao   52    0      1.9       1.9  ANNA            249   63                        CONCLUSION:  Impression The abdominal aorta is patent and normal in caliber.  Findings suggests a >70% stenosis in the celiac artery.  No hemodynamically significant stenosis is noted in the superior and inferior mesenteric arteries.  Unable to appreciate the splenic, common hepatic, and renal arteries due to overlying bowel gas.  SIGNATURE: Electronically Signed by: FRANKLIN CAMPA MD on 2025-04-08 02:44:27 PM    CT chest abdomen pelvis w contrast  Result Date: 4/7/2025  Narrative: CT CHEST, ABDOMEN AND PELVIS WITH IV CONTRAST INDICATION: epigastric pain. COMPARISON: 4/6/2025 TECHNIQUE: CT examination of the chest, abdomen and pelvis was performed. Multiplanar 2D reformatted images were created from the source data. This examination, like all CT scans performed in the [facility], was performed utilizing techniques to minimize radiation dose exposure, including the use of iterative reconstruction and automated exposure control. Radiation dose length product (DLP) for this visit: 1777.57 mGy-cm IV Contrast: 50 mL of iohexol 100 mL of iohexol Enteric Contrast: Administered. FINDINGS: CHEST LUNGS: No focal airspace consolidation or suspicious pulmonary nodule. Mild subsegmental atelectatic changes in the bilateral lower lobes. No tracheal or endobronchial lesion. PLEURA: Unremarkable. HEART/GREAT VESSELS: Heart is unremarkable for patient's age. No  thoracic aortic aneurysm. Mild coronary atherosclerosis. MEDIASTINUM AND MARIELOS: Unremarkable. CHEST WALL AND LOWER NECK: Unremarkable. ABDOMEN LIVER/BILIARY TREE: Unremarkable. GALLBLADDER: No calcified gallstones. No pericholecystic inflammatory change. SPLEEN: Unremarkable. PANCREAS: Unremarkable. ADRENAL GLANDS: Unremarkable. KIDNEYS/URETERS: No hydronephrosis or urinary tract calculi. Subcentimeter hypoattenuating renal lesion(s), too small to characterize but statistically likely benign, which do not warrant follow-up (Radiology June 2019). STOMACH AND BOWEL: Enteric contrast noted opacifying the stomach, gastroduodenal junction and transiting numerous small bowel loops. There is persistent edematous/inflammatory fat stranding seen in the upper abdomen adjacent to the distal stomach and gastroduodenal junction slightly improved from the prior recent study. No extraluminal extravasation of contrast or discrete collection seen. No evidence for bowel obstruction. Redemonstrated findings compatible with acute to subacute mild sigmoid diverticulitis. No definite extraluminal free air or collection seen in this region. APPENDIX: Normal. ABDOMINOPELVIC CAVITY: No ascites. No pneumoperitoneum. No lymphadenopathy. VESSELS: No abdominal aortic aneurysm. Redemonstrated high-grade stenosis/occlusion at the proximal celiac artery with distal reconstitution via collaterals and moderate to severe stenosis at the proximal SMA seen and better evaluated on the prior recent  CTA study. PELVIS REPRODUCTIVE ORGANS: Prostatic calcifications again noted. URINARY BLADDER: Unremarkable. ABDOMINAL WALL/INGUINAL REGIONS: Unremarkable. BONES: No acute fracture or suspicious osseous lesion. Reidentified prior anterior spinal fusion L4-S1.     Impression: No acute thoracic abnormality identified. Persistent inflammatory fat stranding in the upper abdomen adjacent to the distal stomach and gastroduodenal junction compatible with  gastroduodenitis, slightly improved from the prior recent study dated 4/6/2024. No extraluminal extravasation of contrast or discrete collection identified. Findings again may reflect underlying peptic ulcer disease in conjunction with the findings of recent EGD examination. Clinical correlation and GI follow-up recommended. Redemonstrated findings of mild acute/subacute sigmoid diverticulitis. No definite extraluminal free air or collection. Redemonstrated high-grade stenosis/occlusion at the proximal celiac artery and moderate to severe stenosis at the proximal SMA, better evaluated on the prior recent CTA study. Workstation performed: [id number]     EGD  Result Date: 4/7/2025  Narrative: Table formatting from the original result was not included. [facility] [address] [facility] [id number] [phone] DATE OF SERVICE: 4/07/25 PHYSICIAN(S): Attending: Ivan Galvez DO Fellow: No Staff Documented INDICATION: Epigastric pain, Gastritis without bleeding, unspecified chronicity, unspecified gastritis type POST-OP DIAGNOSIS: See the impression below. PREPROCEDURE: Informed consent was obtained for the procedure, including sedation.  Risks of perforation, hemorrhage, adverse drug reaction and aspiration were discussed. The patient was placed in the left lateral decubitus position. Patient was explained about the risks and benefits of the procedure. Risks including but not limited to bleeding, infection, and perforation were explained in detail. Also explained about less than 100% sensitivity with the exam and other alternatives. PROCEDURE: EGD DETAILS OF PROCEDURE: Patient was taken to the procedure room where a time out was performed to confirm correct patient and correct procedure. The patient underwent monitored anesthesia care, which was administered by an anesthesia professional. The patient's blood pressure, heart rate, level of consciousness, respirations, oxygen, ECG  and ETCO2 were monitored throughout the procedure. The scope was introduced through the mouth and advanced to the second part of the duodenum. Retroflexion was performed in the fundus. The patient experienced no blood loss. The procedure was not difficult. The patient tolerated the procedure well. There were no apparent adverse events. ANESTHESIA INFORMATION: ASA: III Anesthesia Type: IV Sedation with Anesthesia MEDICATIONS: enoxaparin (LOVENOX) subcutaneous injection 40 mg 0 mg*  *Some administrations are not included in total (Totals for administrations occurring from 0844 to 0900 on 04/07/25) FINDINGS: Mild esophagitis appearing erythematous in the GE junction Moderate erythematous mucosa with erosion in the antrum, consistent with gastritis; performed cold forceps biopsy to rule out H. pylori Edematous mucosa in the duodenal bulb and 2nd part of the duodenum; performed cold forceps biopsy SPECIMENS: ID Type Source Tests Collected by Time Destination 1 : biopsy r/o celiacs Tissue Duodenum TISSUE EXAM Day Auguste RN 4/7/2025  8:50 AM  2 : r/o h. pylori x 3 Tissue Stomach TISSUE EXAM Day Auguste RN 4/7/2025  8:51 AM      Impression: Mild nonerosive esophagitis Moderate erosive gastritis without bleeding biopsied for H. Pylori Mild to moderate duodenitis of the bulb, biopsies obtained RECOMMENDATION: Resume diet Await biopsies Continue twice daily PPI, Carafate before meals.  I added Pepcid at bedtime If intermittent abdominal pain persists despite treatment of gastritis and duodenitis, will discuss HIDA with surgical service    Ivan Galvez, DO     US right upper quadrant  Result Date: 4/6/2025  Narrative: RIGHT UPPER QUADRANT ULTRASOUND INDICATION: upper abd pain, N/V, dagoberto LFT's r/o hepatobiliary disease. COMPARISON: CTA chest/abdomen/pelvis 4/6/2025. TECHNIQUE: Real-time ultrasound of the right upper quadrant was performed with a curvilinear transducer with both volumetric sweeps and still imaging  techniques. FINDINGS: PANCREAS: Portions of the pancreas are obscured by bowel gas. Visualized portions of the pancreas are unremarkable. AORTA AND IVC: Visualized portions are normal for patient age. LIVER: Size: Within normal range. The liver measures 16.1 cm in the midclavicular line. Contour: Surface contour is smooth. Parenchyma: There is moderate diffuse increased echogenicity with smooth echotexture and acoustic beam attenuation. Most consistent with moderate hepatic steatosis. No liver mass identified. Focal fatty sparing adjacent to the gallbladder fossa Limited imaging of the main portal vein shows it to be patent and hepatopetal. BILIARY: Gallbladder adenomyomatosis versus 3 mm gallbladder polyp, for which no follow-up is recommended.. No intrahepatic biliary dilatation. CBD measures 5.0 mm. No choledocholithiasis. KIDNEY: Right kidney measures 12.1 x 5.4 x 5.6 cm. Volume 190.1 mL Kidney within normal limits. ASCITES: None.     Impression: Hepatic steatosis. Otherwise, no significant abnormality. Workstation performed: [id number]     CTA dissection protocol chest/abdomen/pelvis  Result Date: 4/6/2025  Narrative: CTA - CHEST, ABDOMEN AND PELVIS - WITHOUT AND WITH IV CONTRAST INDICATION: chest pain, HTN, smoking history, evaluate for aortic aneurysm/dissection. Chest pain, upper abdominal pain, nausea, vomiting. COMPARISON: CT of the abdomen and pelvis dated May 8, 2023. TECHNIQUE: CT examination of the chest, abdomen and pelvis was performed both prior to and after the administration of intravenous contrast. The noncontrast portion of this examination was performed utilizing low radiation dose technique.  Thin section angiographic arterial phase post contrast technique was used in order to evaluate for aortic dissection. 3D reformatted images and volume rendering were performed on an independent workstation. Multiplanar 2D reformatted images were created from the source data. Radiation dose length product  (DLP) for this visit: 1966.04 mGy-cm . This examination, like all CT scans performed in the [facility], was performed utilizing techniques to minimize radiation dose exposure, including the use of iterative reconstruction and automated exposure control. IV Contrast: 100 mL of iohexol Enteric Contrast: Not administered. FINDINGS: AORTA: No aortic dissection or intramural hematoma. No aortic aneurysm. There appears to be high-grade stenosis versus occlusion of the proximal celiac axis spanning a length of approximately 12 mm. This is likely chronic as the distal celiac axis and its branches are reconstituted via collaterals. There is approximately 50%  stenosis of the proximal SMA with some poststenotic dilatation evident (series 605 image 105). The ANNA is prominent. There is anastomosis between the ANNA and SMA (arc of Riolan variant). The renal arteries are patent. There is atherosclerosis of the distal abdominal aorta and some of the iliac arteries. CHEST LUNGS: No focal consolidation. No pneumothorax. PLEURA: Unremarkable. HEART/PULMONARY ARTERIAL TREE: Coronary artery calcifications. MEDIASTINUM AND MARIELOS: Unremarkable. CHEST WALL AND LOWER NECK: Unremarkable. ABDOMEN LIVER/BILIARY TREE: Unremarkable. GALLBLADDER: No calcified gallstones. No pericholecystic inflammatory change. SPLEEN: Unremarkable. PANCREAS: Unremarkable. ADRENAL GLANDS: Small right adrenal nodule unchanged. KIDNEYS/URETERS: Unremarkable. No hydronephrosis. STOMACH AND BOWEL: There is significant inflammatory change within the mesentery of the upper abdomen in the region of the distal stomach, first and second portion of the duodenum, and a loop of jejunum in the upper abdomen near the midline. There is colonic diverticulosis and there is acute diverticulitis involving the mid sigmoid. No definite evidence of extraluminal gas. No evidence of drainable abscess collection. No bowel obstruction. APPENDIX: Normal. ABDOMINOPELVIC  CAVITY: As described above. No evidence of pneumoperitoneum. PELVIS REPRODUCTIVE ORGANS: Prostate calcifications. URINARY BLADDER: Unremarkable. ABDOMINAL WALL/INGUINAL REGIONS: Unremarkable. BONES: No acute fracture or suspicious osseous lesion. Spinal degenerative changes. Prior anterior spinal fusion L4-S1.     Impression: There is significant inflammatory change within the mesentery of the upper abdomen in the region of the distal stomach, first and second portion of the duodenum, and a short segment of jejunum. Although this is nonspecific it may possibly be related to ulcer disease. Surgical consultation and Gastroenterology consultation is recommended. Mild acute sigmoid diverticulitis. There is either high-grade stenosis versus occlusion of the proximal celiac axis spanning a length of approximately 12 mm. The distal celiac axis and its branches are reconstituted via collaterals. 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  There is anastomosis between the ANNA and SMA (arc of Riolan variant). No aortic dissection or intramural hematoma. No aortic aneurysm. Atherosclerosis. Coronary artery disease. Other nonemergent and chronic findings as above. I personally discussed this study with PITA RODRIGUEZ on 4/6/2025 2:43 AM. Workstation performed: [id number]     I have spent 41 minutes today on the date of visit which was spent on one or more of the following: obtaining and reviewing separately obtained history, performing a medically appropriate examination and evaluation, counseling and educating the patient, ordering medications, tests, and procedures, documenting clinical information in the electronic or other health record, and care coordination.

## 2025-05-05 ENCOUNTER — ANESTHESIA EVENT (OUTPATIENT)
Dept: ANESTHESIOLOGY | Facility: AMBULATORY SURGERY CENTER | Age: 55
End: 2025-05-05

## 2025-05-05 ENCOUNTER — ANESTHESIA (OUTPATIENT)
Dept: ANESTHESIOLOGY | Facility: AMBULATORY SURGERY CENTER | Age: 55
End: 2025-05-05

## 2025-05-12 ENCOUNTER — HOSPITAL ENCOUNTER (OUTPATIENT)
Dept: GASTROENTEROLOGY | Facility: AMBULATORY SURGERY CENTER | Age: 55
Discharge: HOME | End: 2025-05-12
Attending: INTERNAL MEDICINE
Payer: COMMERCIAL

## 2025-05-12 ENCOUNTER — ANESTHESIA (OUTPATIENT)
Dept: GASTROENTEROLOGY | Facility: AMBULATORY SURGERY CENTER | Age: 55
End: 2025-05-12

## 2025-05-12 ENCOUNTER — ANESTHESIA EVENT (OUTPATIENT)
Dept: GASTROENTEROLOGY | Facility: AMBULATORY SURGERY CENTER | Age: 55
End: 2025-05-12

## 2025-05-12 VITALS
BODY MASS INDEX: 34.36 KG/M2 | HEART RATE: 62 BPM | HEIGHT: 69 IN | WEIGHT: 232 LBS | DIASTOLIC BLOOD PRESSURE: 73 MMHG | TEMPERATURE: 98.2 F | RESPIRATION RATE: 20 BRPM | OXYGEN SATURATION: 95 % | SYSTOLIC BLOOD PRESSURE: 122 MMHG

## 2025-05-12 DIAGNOSIS — Z86.0100 HISTORY OF COLON POLYPS: ICD-10-CM

## 2025-05-12 PROCEDURE — 88305 TISSUE EXAM BY PATHOLOGIST: CPT | Performed by: PATHOLOGY

## 2025-05-12 PROCEDURE — 45385 COLONOSCOPY W/LESION REMOVAL: CPT | Performed by: INTERNAL MEDICINE

## 2025-05-12 RX ORDER — SODIUM CHLORIDE, SODIUM LACTATE, POTASSIUM CHLORIDE, CALCIUM CHLORIDE 600; 310; 30; 20 MG/100ML; MG/100ML; MG/100ML; MG/100ML
125 INJECTION, SOLUTION INTRAVENOUS CONTINUOUS
Status: DISCONTINUED | OUTPATIENT
Start: 2025-05-12 | End: 2025-05-16 | Stop reason: HOSPADM

## 2025-05-12 RX ORDER — PROPOFOL 10 MG/ML
INJECTION, EMULSION INTRAVENOUS AS NEEDED
Status: DISCONTINUED | OUTPATIENT
Start: 2025-05-12 | End: 2025-05-12

## 2025-05-12 RX ORDER — MELOXICAM 15 MG/1
15 TABLET ORAL
COMMUNITY
Start: 2025-05-06

## 2025-05-12 RX ORDER — LIDOCAINE HYDROCHLORIDE 20 MG/ML
INJECTION, SOLUTION EPIDURAL; INFILTRATION; INTRACAUDAL; PERINEURAL AS NEEDED
Status: DISCONTINUED | OUTPATIENT
Start: 2025-05-12 | End: 2025-05-12

## 2025-05-12 RX ADMIN — PROPOFOL 20 MG: 10 INJECTION, EMULSION INTRAVENOUS at 12:59

## 2025-05-12 RX ADMIN — PROPOFOL 20 MG: 10 INJECTION, EMULSION INTRAVENOUS at 13:01

## 2025-05-12 RX ADMIN — SODIUM CHLORIDE, SODIUM LACTATE, POTASSIUM CHLORIDE, CALCIUM CHLORIDE 125 ML/HR: 600; 310; 30; 20 INJECTION, SOLUTION INTRAVENOUS at 12:31

## 2025-05-12 RX ADMIN — PROPOFOL 20 MG: 10 INJECTION, EMULSION INTRAVENOUS at 12:55

## 2025-05-12 RX ADMIN — PROPOFOL 20 MG: 10 INJECTION, EMULSION INTRAVENOUS at 12:57

## 2025-05-12 RX ADMIN — PROPOFOL 20 MG: 10 INJECTION, EMULSION INTRAVENOUS at 13:03

## 2025-05-12 RX ADMIN — PROPOFOL 150 MG: 10 INJECTION, EMULSION INTRAVENOUS at 12:52

## 2025-05-12 RX ADMIN — LIDOCAINE HYDROCHLORIDE 100 MG: 20 INJECTION, SOLUTION EPIDURAL; INFILTRATION; INTRACAUDAL; PERINEURAL at 12:52

## 2025-05-12 NOTE — INTERVAL H&P NOTE
H&P reviewed. After examining the patient I find no changes in the patients condition since the H&P had been written.    Vitals:    05/12/25 1227   BP: 164/87   Pulse: 63   Resp: 16   Temp:    SpO2: 98%

## 2025-05-12 NOTE — ANESTHESIA PREPROCEDURE EVALUATION
Procedure:  COLONOSCOPY    Relevant Problems   ANESTHESIA (within normal limits)      CARDIO   (+) Celiac artery stenosis (HCC)   (+) Hypertension (No meds)      GI/HEPATIC  Acute diverticulitis  NPO for days  No recent nausea/vomiting      MUSCULOSKELETAL   (+) Chronic left-sided low back pain with left-sided sciatica   (+) Primary osteoarthritis of left hip      NEURO/PSYCH   (+) Chronic left-sided low back pain with left-sided sciatica   (+) Chronic pain syndrome      PULMONARY  Smoker   (-) Sleep apnea   (-) URI (upper respiratory infection)       Physical Exam    Airway  Comment: Large neck circumference  Mallampati score: II  TM Distance: >3 FB  Neck ROM: full     Dental   No notable dental hx     Cardiovascular      Pulmonary      Other Findings        Anesthesia Plan  ASA Score- 2     Anesthesia Type- IV sedation with anesthesia with ASA Monitors.         Additional Monitors:     Airway Plan:            Plan Factors-Exercise tolerance (METS): >4 METS.    Chart reviewed.   Existing labs reviewed. Patient summary reviewed.    Patient is a current smoker.              Induction- intravenous.    Postoperative Plan-     Perioperative Resuscitation Plan - Level 1 - Full Code.       Informed Consent- Anesthetic plan and risks discussed with patient.  I personally reviewed this patient with the CRNA. Discussed and agreed on the Anesthesia Plan with the CRNA..      NPO Status:  Vitals Value Taken Time   Date of last liquid 05/12/25 05/12/25 1219   Time of last liquid 0730 05/12/25 1219   Date of last solid 05/11/25 05/12/25 1219   Time of last solid 0900 05/12/25 1219

## 2025-05-15 ENCOUNTER — RESULTS FOLLOW-UP (OUTPATIENT)
Dept: GASTROENTEROLOGY | Facility: CLINIC | Age: 55
End: 2025-05-15

## 2025-05-15 PROCEDURE — 88305 TISSUE EXAM BY PATHOLOGIST: CPT | Performed by: PATHOLOGY

## 2025-06-03 ENCOUNTER — TELEPHONE (OUTPATIENT)
Age: 55
End: 2025-06-03

## 2025-06-03 NOTE — TELEPHONE ENCOUNTER
Patient calling extremely upset after seeing pain management.  Attempted to prescribed meloxicam but will not give him due to gastritis finding from past EGD  Would like call back ASAP or he is calling  to [facility]ams for stating lies in reports since there was no reason placed for gastritis.  States it was from him dry heaving and that he does not really have anything wrong with him and never has.  It was an incidental finding that we had no business commenting on.

## 2025-06-04 DIAGNOSIS — K21.9 GASTROESOPHAGEAL REFLUX DISEASE, UNSPECIFIED WHETHER ESOPHAGITIS PRESENT: Primary | ICD-10-CM

## 2025-06-10 NOTE — TELEPHONE ENCOUNTER
"Pt calling back in to verify the discrepancy about no NSAID use was rectified. Informed pt that per Dr. Benítez's note from 6/4/25 at 1430: \"Hello I spoke to the patient over the phone.  He understands that he does have a history of gastritis on his EGD report that is now resolved.  He did not like that his pain medication of Mobic was stopped due to this history of gastritis.  There should not be any contraindication for restarting Mobic as his gastritis is now resolved after the course of pantoprazole.  This was noted in my note.\"   Pt is requested that be printed out for him to  because [facility] and [facility] won't prescribe the Mobic without documentation. Please call pt when ready to be picked up ph: [phone].     "